# Patient Record
Sex: FEMALE | Race: WHITE | Employment: OTHER | URBAN - METROPOLITAN AREA
[De-identification: names, ages, dates, MRNs, and addresses within clinical notes are randomized per-mention and may not be internally consistent; named-entity substitution may affect disease eponyms.]

---

## 2020-10-01 ENCOUNTER — HOSPITAL ENCOUNTER (OUTPATIENT)
Age: 75
Discharge: HOME | End: 2020-10-03
Payer: COMMERCIAL

## 2020-10-01 PROCEDURE — U0003 INFECTIOUS AGENT DETECTION BY NUCLEIC ACID (DNA OR RNA); SEVERE ACUTE RESPIRATORY SYNDROME CORONAVIRUS 2 (SARS-COV-2) (CORONAVIRUS DISEASE [COVID-19]), AMPLIFIED PROBE TECHNIQUE, MAKING USE OF HIGH THROUGHPUT TECHNOLOGIES AS DESCRIBED BY CMS-2020-01-R: HCPCS

## 2020-10-03 LAB
SARS-COV-2: NOT DETECTED
SOURCE: NORMAL

## 2020-10-06 ENCOUNTER — HOSPITAL ENCOUNTER (OUTPATIENT)
Age: 75
Discharge: HOME | End: 2020-10-08
Payer: COMMERCIAL

## 2020-10-06 PROCEDURE — U0003 INFECTIOUS AGENT DETECTION BY NUCLEIC ACID (DNA OR RNA); SEVERE ACUTE RESPIRATORY SYNDROME CORONAVIRUS 2 (SARS-COV-2) (CORONAVIRUS DISEASE [COVID-19]), AMPLIFIED PROBE TECHNIQUE, MAKING USE OF HIGH THROUGHPUT TECHNOLOGIES AS DESCRIBED BY CMS-2020-01-R: HCPCS

## 2020-10-08 LAB
SARS-COV-2: NOT DETECTED
SOURCE: NORMAL

## 2020-10-09 ENCOUNTER — HOSPITAL ENCOUNTER (OUTPATIENT)
Age: 75
Discharge: HOME | End: 2020-10-11
Payer: COMMERCIAL

## 2020-10-09 PROCEDURE — U0003 INFECTIOUS AGENT DETECTION BY NUCLEIC ACID (DNA OR RNA); SEVERE ACUTE RESPIRATORY SYNDROME CORONAVIRUS 2 (SARS-COV-2) (CORONAVIRUS DISEASE [COVID-19]), AMPLIFIED PROBE TECHNIQUE, MAKING USE OF HIGH THROUGHPUT TECHNOLOGIES AS DESCRIBED BY CMS-2020-01-R: HCPCS

## 2020-10-11 LAB
SARS-COV-2: NOT DETECTED
SOURCE: NORMAL

## 2020-11-06 LAB
SARS-COV-2: NOT DETECTED
SOURCE: NORMAL

## 2020-11-15 LAB
SARS-COV-2: NOT DETECTED
SOURCE: NORMAL

## 2020-11-20 LAB
SARS-COV-2: NOT DETECTED
SOURCE: NORMAL

## 2020-11-24 LAB
SARS-COV-2: NOT DETECTED
SOURCE: NORMAL

## 2020-11-28 LAB
SARS-COV-2: NOT DETECTED
SOURCE: NORMAL

## 2020-12-12 LAB
SARS-COV-2: NOT DETECTED
SOURCE: NORMAL

## 2020-12-24 LAB
SARS-COV-2: NOT DETECTED
SOURCE: NORMAL

## 2020-12-31 LAB
SARS-COV-2: NOT DETECTED
SOURCE: NORMAL

## 2021-01-13 LAB
SARS-COV-2: NOT DETECTED
SOURCE: NORMAL

## 2021-04-04 LAB
SARS-COV-2: NOT DETECTED
SOURCE: NORMAL

## 2022-02-25 ENCOUNTER — TELEPHONE (OUTPATIENT)
Dept: SURGERY | Age: 77
End: 2022-02-25

## 2022-02-25 NOTE — PROGRESS NOTES
Orders for nutrition will be coming from Dr. Jim Hunter and I left a message to social service about patient have a peg tube replacement on 3/3  Electronically signed by Rg Calhoun on 2/25/2022 at 3:17 PM

## 2022-02-25 NOTE — TELEPHONE ENCOUNTER
Prior Authorization Form:      DEMOGRAPHICS:                     Patient Name:  Oracio Fleming  Patient :  1945            Insurance:  Payor: Derik Torres / Plan: Milan Kehr / Product Type: *No Product type* /   Insurance ID Number:    Payor/Plan Subscr  Sex Relation Sub.  Ins. ID Effective Group Num   1. MIA Mckinney 1945 Female Self [id number] 10/1/20 [id number]                                   [address]         DIAGNOSIS & PROCEDURE:                       Procedure/Operation: EGD with PEG tube placement           CPT Code: 87869    Diagnosis:  Inadequate oral intake    ICD10 Code: R63.8    Location:  [address] [place] [address]    Surgeon:  Ange Magana INFORMATION:                          Date: 3.3.2022    Time: TBD              Anesthesia:  MAC/TIVA                                                       Status:  Outpatient        Special Comments:         Electronically signed by Zelalem Mijares on 2022 at 1:05 PM

## 2022-02-25 NOTE — TELEPHONE ENCOUNTER
Per the order of Dr. Virginia Camara, patient has been scheduled for PEG tube placement on 3.3.2022. Patient currently in [facility]. All information provided to Salma at facility. Patient instructed to please contact our office with any questions. Dr. Kiah Peters will be providing patient with all nutrition orders. Procedure scheduled through [employer]. Dr. Virginia Camara to enter orders.     Electronically signed by Jordan Lyles on 2/25/22 at 1:05 PM EST

## 2022-03-02 RX ORDER — BUPROPION HYDROCHLORIDE 100 MG/1
100 TABLET, EXTENDED RELEASE ORAL 2 TIMES DAILY
Status: ON HOLD | COMMUNITY
End: 2022-03-22 | Stop reason: HOSPADM

## 2022-03-02 RX ORDER — CYANOCOBALAMIN 1000 UG/ML
1000 INJECTION INTRAMUSCULAR; SUBCUTANEOUS ONCE
COMMUNITY

## 2022-03-02 RX ORDER — SERTRALINE HYDROCHLORIDE 100 MG/1
100 TABLET, FILM COATED ORAL DAILY
COMMUNITY

## 2022-03-02 RX ORDER — FLUCONAZOLE 200 MG/1
200 TABLET ORAL DAILY
Status: ON HOLD | COMMUNITY
End: 2022-03-22 | Stop reason: HOSPADM

## 2022-03-02 RX ORDER — MECOBALAMIN 5000 MCG
5 TABLET,DISINTEGRATING ORAL NIGHTLY
Status: ON HOLD | COMMUNITY
End: 2022-03-22 | Stop reason: HOSPADM

## 2022-03-02 RX ORDER — PALIPERIDONE 1.5 MG/1
1.5 TABLET, EXTENDED RELEASE ORAL EVERY MORNING
Status: ON HOLD | COMMUNITY
End: 2022-03-22 | Stop reason: HOSPADM

## 2022-03-02 RX ORDER — FAMOTIDINE 40 MG/1
40 TABLET, FILM COATED ORAL DAILY
Status: ON HOLD | COMMUNITY
End: 2022-03-22 | Stop reason: HOSPADM

## 2022-03-02 NOTE — PROGRESS NOTES
preop instructions faxed to [address]   call placed to sister David Appiah della she states pt does not sign papers  David Appiah usually signs for her but son is poa lives out of state    Call placed to son Pearl Payor  [phone] poomero permission obtained per phone with 2 person witness

## 2022-03-02 NOTE — PROGRESS NOTES
[facility]                                                                                                                    PRE OP INSTRUCTIONS FOR  Karyn Rodriguez        Date: 3/2/2022    Date of surgery: 3/3/2022     730am   Arrival Time:  65-   545 am to ambulatory surgery  By stretcher    1. Do not eat or drink anything after  Midnight  prior to surgery. This includes no water, chewing gum, mints or ice chips. 2. Take the following medications with a small sip of water on the morning of Surgery: may have meds morning of surgery only wellbutrin zoloft and levothyroxin with tiny sip water    3. Diabetics may take evening dose of insulin but none after midnight. If you feel symptomatic or low blood sugar morning of surgery drink 1-2 ounces of apple juice only. 4. Aspirin, Ibuprofen, Advil, Naproxen, Vitamin E and other Anti-inflammatory products should be stopped  before surgery  as directed by your physician. Take Tylenol only unless instructed otherwise by your surgeon. 5. Check with your Doctor regarding stopping Plavix, Coumadin, Lovenox, Eliquis, Effient, or other blood thinners. 6. Do not smoke,use illicit drugs and do not drink any alcoholic beverages 24 hours prior to surgery. 7. You may brush your teeth the morning of surgery. DO NOT SWALLOW WATER    8. You MUST make arrangements for a responsible adult to take you home after your surgery. You will not be allowed to leave alone or drive yourself home. It is strongly suggested someone stay with you the first 24 hrs. Your surgery will be cancelled if you do not have a ride home. 9. PEDIATRIC PATIENTS ONLY:  A parent/legal guardian must accompany a child scheduled for surgery and plan to stay at the hospital until the child is discharged. Please do not bring other children with you.     10. Please wear simple, loose fitting clothing to the hospital.  Do not bring valuables (money, credit cards, checkbooks, etc.) Do not wear any makeup (including no eye makeup) or nail polish on your fingers or toes. 11. DO NOT wear any jewelry or piercings on day of surgery. All body piercing jewelry must be removed. 12. Shower the night before surgery with __x_Antibacterial soap /DALE WIPES________    13. TOTAL JOINT REPLACEMENT/HYSTERECTOMY PATIENTS ONLY---Remember to bring Blood Bank bracelet to the hospital on the day of surgery. 14. If you have a Living Will and Durable Power of  for Healthcare, please bring in a copy. 15. If appropriate bring crutches, inspirex, WALKER, CANE etc... 12. Notify your Surgeon if you develop any illness between now and surgery time, cough, cold, fever, sore throat, nausea, vomiting, etc.  Please notify your surgeon if you experience dizziness, shortness of breath or blurred vision between now & the time of your surgery. 17. If you have ___dentures, they will be removed before going to the OR; we will provide you a container. If you wear ___contact lenses or ___glasses, they will be removed; please bring a case for them. 18. To provide excellent care visitors will be limited to 2 in the room at any given time. 19. Please bring picture ID and insurance card. 20. Sleep apnea patients need to bring CPAP AND SETTINGS to hospital on day of surgery. 21. During flu season no children under the age of 15 are permitted in the hospital for the safety of all patients. 22. Other                  Please call AMBULATORY CARE if you have any further questions.    [facility]     [address]

## 2022-03-03 ENCOUNTER — ANESTHESIA (OUTPATIENT)
Dept: OPERATING ROOM | Age: 77
End: 2022-03-03
Payer: COMMERCIAL

## 2022-03-03 ENCOUNTER — ANESTHESIA EVENT (OUTPATIENT)
Dept: OPERATING ROOM | Age: 77
End: 2022-03-03
Payer: COMMERCIAL

## 2022-03-03 ENCOUNTER — HOSPITAL ENCOUNTER (OUTPATIENT)
Age: 77
Setting detail: OUTPATIENT SURGERY
Discharge: NURSING, REHAB OR LONG-TERM CARE | End: 2022-03-03
Attending: SURGERY | Admitting: SURGERY
Payer: COMMERCIAL

## 2022-03-03 VITALS
WEIGHT: 104 LBS | BODY MASS INDEX: 20.96 KG/M2 | HEIGHT: 59 IN | OXYGEN SATURATION: 96 % | HEART RATE: 89 BPM | RESPIRATION RATE: 18 BRPM | DIASTOLIC BLOOD PRESSURE: 72 MMHG | TEMPERATURE: 97.1 F | SYSTOLIC BLOOD PRESSURE: 132 MMHG

## 2022-03-03 VITALS
SYSTOLIC BLOOD PRESSURE: 104 MMHG | DIASTOLIC BLOOD PRESSURE: 56 MMHG | OXYGEN SATURATION: 98 % | RESPIRATION RATE: 17 BRPM

## 2022-03-03 PROCEDURE — 3700000001 HC ADD 15 MINUTES (ANESTHESIA): Performed by: SURGERY

## 2022-03-03 PROCEDURE — 3700000000 HC ANESTHESIA ATTENDED CARE: Performed by: SURGERY

## 2022-03-03 PROCEDURE — 2580000003 HC RX 258: Performed by: ANESTHESIOLOGY

## 2022-03-03 PROCEDURE — 7100000010 HC PHASE II RECOVERY - FIRST 15 MIN: Performed by: SURGERY

## 2022-03-03 PROCEDURE — 3600000002 HC SURGERY LEVEL 2 BASE: Performed by: SURGERY

## 2022-03-03 PROCEDURE — 2500000003 HC RX 250 WO HCPCS: Performed by: SURGERY

## 2022-03-03 PROCEDURE — 2580000003 HC RX 258: Performed by: NURSE ANESTHETIST, CERTIFIED REGISTERED

## 2022-03-03 PROCEDURE — 2709999900 HC NON-CHARGEABLE SUPPLY: Performed by: SURGERY

## 2022-03-03 PROCEDURE — 6360000002 HC RX W HCPCS: Performed by: SURGERY

## 2022-03-03 PROCEDURE — 7100000011 HC PHASE II RECOVERY - ADDTL 15 MIN: Performed by: SURGERY

## 2022-03-03 PROCEDURE — 6360000002 HC RX W HCPCS: Performed by: NURSE ANESTHETIST, CERTIFIED REGISTERED

## 2022-03-03 PROCEDURE — 2580000003 HC RX 258: Performed by: SURGERY

## 2022-03-03 PROCEDURE — 43246 EGD PLACE GASTROSTOMY TUBE: CPT | Performed by: SURGERY

## 2022-03-03 PROCEDURE — 3600000012 HC SURGERY LEVEL 2 ADDTL 15MIN: Performed by: SURGERY

## 2022-03-03 PROCEDURE — 6370000000 HC RX 637 (ALT 250 FOR IP): Performed by: ANESTHESIOLOGY

## 2022-03-03 RX ORDER — SODIUM CHLORIDE 9 MG/ML
25 INJECTION, SOLUTION INTRAVENOUS PRN
Status: DISCONTINUED | OUTPATIENT
Start: 2022-03-03 | End: 2022-03-03 | Stop reason: HOSPADM

## 2022-03-03 RX ORDER — SODIUM CHLORIDE, SODIUM LACTATE, POTASSIUM CHLORIDE, CALCIUM CHLORIDE 600; 310; 30; 20 MG/100ML; MG/100ML; MG/100ML; MG/100ML
INJECTION, SOLUTION INTRAVENOUS CONTINUOUS PRN
Status: DISCONTINUED | OUTPATIENT
Start: 2022-03-03 | End: 2022-03-03 | Stop reason: SDUPTHER

## 2022-03-03 RX ORDER — SODIUM CHLORIDE, SODIUM LACTATE, POTASSIUM CHLORIDE, CALCIUM CHLORIDE 600; 310; 30; 20 MG/100ML; MG/100ML; MG/100ML; MG/100ML
INJECTION, SOLUTION INTRAVENOUS CONTINUOUS
Status: DISCONTINUED | OUTPATIENT
Start: 2022-03-03 | End: 2022-03-03 | Stop reason: HOSPADM

## 2022-03-03 RX ORDER — LIDOCAINE HYDROCHLORIDE 10 MG/ML
INJECTION, SOLUTION EPIDURAL; INFILTRATION; INTRACAUDAL; PERINEURAL PRN
Status: DISCONTINUED | OUTPATIENT
Start: 2022-03-03 | End: 2022-03-03 | Stop reason: ALTCHOICE

## 2022-03-03 RX ORDER — SODIUM CHLORIDE 0.9 % (FLUSH) 0.9 %
5-40 SYRINGE (ML) INJECTION PRN
Status: DISCONTINUED | OUTPATIENT
Start: 2022-03-03 | End: 2022-03-03 | Stop reason: HOSPADM

## 2022-03-03 RX ORDER — SODIUM CHLORIDE 0.9 % (FLUSH) 0.9 %
5-40 SYRINGE (ML) INJECTION EVERY 12 HOURS SCHEDULED
Status: DISCONTINUED | OUTPATIENT
Start: 2022-03-03 | End: 2022-03-03 | Stop reason: HOSPADM

## 2022-03-03 RX ORDER — PROPOFOL 10 MG/ML
INJECTION, EMULSION INTRAVENOUS CONTINUOUS PRN
Status: DISCONTINUED | OUTPATIENT
Start: 2022-03-03 | End: 2022-03-03 | Stop reason: SDUPTHER

## 2022-03-03 RX ORDER — PROPOFOL 10 MG/ML
INJECTION, EMULSION INTRAVENOUS PRN
Status: DISCONTINUED | OUTPATIENT
Start: 2022-03-03 | End: 2022-03-03 | Stop reason: SDUPTHER

## 2022-03-03 RX ORDER — MEPERIDINE HYDROCHLORIDE 25 MG/ML
12.5 INJECTION INTRAMUSCULAR; INTRAVENOUS; SUBCUTANEOUS EVERY 5 MIN PRN
Status: DISCONTINUED | OUTPATIENT
Start: 2022-03-03 | End: 2022-03-03 | Stop reason: HOSPADM

## 2022-03-03 RX ORDER — ACETAMINOPHEN 325 MG/1
650 TABLET ORAL ONCE
Status: COMPLETED | OUTPATIENT
Start: 2022-03-03 | End: 2022-03-03

## 2022-03-03 RX ORDER — LIDOCAINE HYDROCHLORIDE 20 MG/ML
INJECTION, SOLUTION INTRAVENOUS PRN
Status: DISCONTINUED | OUTPATIENT
Start: 2022-03-03 | End: 2022-03-03 | Stop reason: SDUPTHER

## 2022-03-03 RX ADMIN — PROPOFOL INJECTABLE EMULSION 20 MG: 10 INJECTION, EMULSION INTRAVENOUS at 11:55

## 2022-03-03 RX ADMIN — LIDOCAINE HYDROCHLORIDE 60 MG: 20 INJECTION, SOLUTION INTRAVENOUS at 11:49

## 2022-03-03 RX ADMIN — PROPOFOL INJECTABLE EMULSION 30 MG: 10 INJECTION, EMULSION INTRAVENOUS at 11:49

## 2022-03-03 RX ADMIN — SODIUM CHLORIDE, POTASSIUM CHLORIDE, SODIUM LACTATE AND CALCIUM CHLORIDE: 600; 310; 30; 20 INJECTION, SOLUTION INTRAVENOUS at 10:00

## 2022-03-03 RX ADMIN — CEFAZOLIN 1000 MG: 1 INJECTION, POWDER, FOR SOLUTION INTRAMUSCULAR; INTRAVENOUS at 13:18

## 2022-03-03 RX ADMIN — PROPOFOL 120 MCG/KG/MIN: 10 INJECTION, EMULSION INTRAVENOUS at 11:49

## 2022-03-03 RX ADMIN — SODIUM CHLORIDE, POTASSIUM CHLORIDE, SODIUM LACTATE AND CALCIUM CHLORIDE: 600; 310; 30; 20 INJECTION, SOLUTION INTRAVENOUS at 11:34

## 2022-03-03 RX ADMIN — PROPOFOL INJECTABLE EMULSION 20 MG: 10 INJECTION, EMULSION INTRAVENOUS at 11:51

## 2022-03-03 RX ADMIN — PROPOFOL INJECTABLE EMULSION 20 MG: 10 INJECTION, EMULSION INTRAVENOUS at 11:53

## 2022-03-03 RX ADMIN — ACETAMINOPHEN 650 MG: 325 TABLET ORAL at 13:04

## 2022-03-03 ASSESSMENT — PULMONARY FUNCTION TESTS
PIF_VALUE: 1
PIF_VALUE: 0
PIF_VALUE: 0
PIF_VALUE: 1
PIF_VALUE: 0
PIF_VALUE: 1
PIF_VALUE: 0
PIF_VALUE: 1
PIF_VALUE: 1
PIF_VALUE: 0
PIF_VALUE: 1
PIF_VALUE: 2

## 2022-03-03 ASSESSMENT — PAIN SCALES - GENERAL
PAINLEVEL_OUTOF10: 0
PAINLEVEL_OUTOF10: 2
PAINLEVEL_OUTOF10: 8

## 2022-03-03 ASSESSMENT — PAIN DESCRIPTION - LOCATION: LOCATION: ABDOMEN

## 2022-03-03 ASSESSMENT — PAIN - FUNCTIONAL ASSESSMENT: PAIN_FUNCTIONAL_ASSESSMENT: 0-10

## 2022-03-03 ASSESSMENT — PAIN DESCRIPTION - PAIN TYPE: TYPE: ACUTE PAIN;SURGICAL PAIN

## 2022-03-03 NOTE — H&P
General Surgery History and Physical  [facility]    Patient's Name/Date of Birth: Medhat Tinoco [birth year]    Date: March 3, 2022     Surgeon: Lucy Quintana MD    PCP: Luis Corcoran MD     Chief Complaint: Failure to thrive, poor p.o. intake    HPI:   Medhat Tinoco is a 67 y.o. female who presents for evaluation of failure to thrive and poor p.o. intake with malnutrition. The patient is minimally conversant and history is obtained from her sister. Her sister reports that she was doing well however recently just completely stopped eating. She has lost a significant amount of weight from poor p.o. intake. She was referred for feeding tube placement. Patient Active Problem List   Diagnosis    Odontoid fracture (HCC)    Hypothyroid    Multiple rib fractures    TMJ (temporomandibular joint syndrome)    Bipolar disorder (HCC)    Sciatica    Hiatal hernia    Abnormality of gait and mobility    Trigeminal neuralgia    Headache    Cervical neck pain with evidence of disc disease    Vomiting    Hypokalemia       Past Medical History:   Diagnosis Date    Allergic     Anxiety disorder     Arthritis     Difficulty walking     Dysphagia     Hyperlipidemia     Hypertension     Interstitial cystitis     Lack of coordination     Mitral valve prolapse     pt. has a mitral valve prolapse    Neuromuscular disorder (HCC)     Other disorders of kidney and ureter in diseases classified elsewhere     Pelvis fracture (HCC)     multiple fxs pelvis    Psychiatric problem     Thyroid disease     Trigeminal neuralgia     Unspecified dementia without behavioral disturbance ([facility] [address]        Past Surgical History:   Procedure Laterality Date    FRACTURE SURGERY      TEMPOROMANDIBULAR JOINT SURGERY Left        Allergies   Allergen Reactions    Abilify [Aripiprazole]     Depakote [Divalproex Sodium]     Dye [Iodides] Hives     Pt. Is allergic to CT dye. Breaks out in hive.s  Pt.  Is allergic to CT dye. Breaks out in hives. Pt. Gets pre-medicated with benadryl prior to CT.  Geodon [Ziprasidone Hcl]     Levsin [Hyoscyamine]     Lithium     Neurontin [Gabapentin]     Risperidone And Related     Tegretol [Carbamazepine]     Tramadol     Trileptal [Oxcarbazepine]     Zyprexa [Olanzapine]        The patient has a family history that is negative for severe cardiovascular or respiratory issues, negative for reaction to anesthesia. Time spent reviewing past medical, surgical, social and family history, vitals, nursing assessment and images. No changes from above documented history. Social History     Socioeconomic History    Marital status:      Spouse name: Not on file    Number of children: 2    Years of education: 15    Highest education level: Not on file   Occupational History    Not on file   Tobacco Use    Smoking status: Never Smoker    Smokeless tobacco: Not on file   Substance and Sexual Activity    Alcohol use: No    Drug use: No    Sexual activity: Never   Other Topics Concern    Not on file   Social History Narrative    Not on file     Social Determinants of Health     Financial Resource Strain:     Difficulty of Paying Living Expenses: Not on file   Food Insecurity:     Worried About Running Out of Food in the Last Year: Not on file    Kassy of Food in the Last Year: Not on file   Transportation Needs:     Lack of Transportation (Medical): Not on file    Lack of Transportation (Non-Medical):  Not on file   Physical Activity:     Days of Exercise per Week: Not on file    Minutes of Exercise per Session: Not on file   Stress:     Feeling of Stress : Not on file   Social Connections:     Frequency of Communication with Friends and Family: Not on file    Frequency of Social Gatherings with Friends and Family: Not on file    Attends Restorationism Services: Not on file    Active Member of Clubs or Organizations: Not on file    Attends Club or Organization Meetings: Not on file    Marital Status: Not on file   Intimate Partner Violence:     Fear of Current or Ex-Partner: Not on file    Emotionally Abused: Not on file    Physically Abused: Not on file    Sexually Abused: Not on file   Housing Stability:     Unable to Pay for Housing in the Last Year: Not on file    Number of Dru in the Last Year: Not on file    Unstable Housing in the Last Year: Not on file       I have reviewed relevant labs from this admission and interpretation is included in my assessment and plan    Review of Systems    A complete 10 system review was performed and are otherwise negative unless mentioned in the above HPI. Specific negatives are listed below but may not include all those reviewed.     General ROS: negative obtundation, AMS  ENT ROS: negative rhinorrhea, epistaxis  Allergy and Immunology ROS: negative itchy/watery eyes or nasal congestion  Hematological and Lymphatic ROS: negative spontaneous bleeding or bruising  Endocrine ROS: negative  lethargy, mood swings, palpitations or polydipsia/polyuria  Respiratory ROS: negative sputum changes, stridor, tachypnea or wheezing  Cardiovascular ROS: negative for - loss of consciousness, murmur or orthopnea  Gastrointestinal ROS: negative for - hematochezia or hematemesis  Genito-Urinary ROS: negative for -  genital discharge or hematuria  Musculoskeletal ROS: negative for - focal weakness, gangrene  Psych/Neuro ROS: negative for - visual or auditory hallucinations, suicidal ideation    Physical exam:   /69   Pulse 90   Temp 97.8 °F (36.6 °C) (Temporal)   Resp 16   Ht 4' 11\" (1.499 m)   Wt 104 lb (47.2 kg)   SpO2 94%   BMI 21.01 kg/m²   General appearance:  NAD, appears stated age  Head: NCAT, PERRLA, EOMI, red conjunctiva  Neck: supple, no masses, trachea midline  Lungs: Equal chest rise bilateral, no retractions, no wheezing  Heart: Reg rate  Abdomen: soft, nondistended  Skin; warm and dry, no cyanosis  Gu: no cva tenderness  Extremities: atraumatic, no focal motor deficits, no open wounds  Psych: No tremor, visual hallucinations      Radiology: n/a    Assessment:  Jennie Meraz is a 67 y.o. female with failure to thrive, poor p.o. intake  Patient Active Problem List   Diagnosis    Odontoid fracture ([facility] [address]    Hypothyroid    Multiple rib fractures    TMJ (temporomandibular joint syndrome)    Bipolar disorder (HCC)    Sciatica    Hiatal hernia    Abnormality of gait and mobility    Trigeminal neuralgia    Headache    Cervical neck pain with evidence of disc disease    Vomiting    Hypokalemia         Plan:  EGD with PEG  -The procedure, risks, benefits and alternatives were discussed with patient. she   agrees to proceed.         Matthew Sanders MD  10:30 AM

## 2022-03-03 NOTE — ANESTHESIA POSTPROCEDURE EVALUATION
Department of Anesthesiology  Postprocedure Note    Patient: Jeanette Pace  MRN: [id number]  YOB: 1945  Date of evaluation: 3/3/2022  Time:  12:49 PM     Procedure Summary     Date: 03/03/22 Room / Location: [address] [place] [address] / [address] [place] [address]    Anesthesia Start: 1140 Anesthesia Stop: 1219    Procedure: EGD PEG TUBE PLACEMENT (N/A Abdomen) Diagnosis: (INADEQUATE ORAL INTAKE)    Surgeons: yAesha De Dios MD Responsible Provider: Tamika Hilliard MD    Anesthesia Type: MAC ASA Status: 4          Anesthesia Type: MAC    River Phase I: River Score: 10    River Phase II: River Score: 10    Last vitals: Reviewed and per EMR flowsheets.        Anesthesia Post Evaluation    Patient location during evaluation: PACU  Patient participation: complete - patient participated  Level of consciousness: awake  Pain score: 0  Airway patency: patent  Nausea & Vomiting: no nausea  Complications: no  Cardiovascular status: blood pressure returned to baseline  Respiratory status: acceptable  Hydration status: euvolemic

## 2022-03-03 NOTE — OP NOTE
[facility] [facility]  OPERATIVE NOTE    3/3/2022  Medhat Tinoco  11:38 AM    PREOPERATIVE DIAGNOSES: Malnutrition, poor p.o. intake. POSTOPERATIVE DIAGNOSES: Malnutrition, poor p.o. intake. OPERATION: Percutaneous endoscopic gastrostomy tube placement with upper endoscopy. SURGEON: Lucy Quintana MD    ASSISTANT: none. ANESTHESIA: LMAC     ESTIMATED BLOOD LOSS: Minimal.     COMPLICATIONS: None. SPECIMEN: None. DESCRIPTION OF PROCEDURE: PROCEDURE: The patient was taken to the endoscopy suite and placed on the endoscopy table in a supine position. LMAC anesthesia was administered. A bite block was inserted. A lubricated gastroscope was inserted into the oropharynx and advanced under direct vision to the esophagus and then the stomach. The stomach was insufflated. The anterior abdominal wall was transilluminated. The light source was easily visualized. Indentation was observed with palpation of the abdominal wall. This area was prepped and draped. Local anesthetic was injected. A #11 blade was used to make a transverse incision. A snare forceps was inserted through the gastroscope and deployed into the gastric lumen. An angiocatheter was inserted through the incision into the gastric lumen under direct vision. A wire was inserted through the anterior catheter and grasped   with the snare forceps. The gastroscope, snare, and wire were then pulled out through the patient's mouth. The gastrostomy tube was connected to the wire, and the wire was pulled through the stomach and out through the anterior abdominal wall. The gastroscope was reintroduced into the stomach. The PEG tube was seen in good position without evidence of bleeding. The gastroscope was removed. The PEG tube was cut to size and fit with a bumper and a feeding apparatus at 2.5 cm at the abdominal wall.  The patient tolerated the procedure well and went to the recovery room in a good condition. I was present for the entire procedure. All instrument counts, lap counts, and needle counts were correct at the end of the procedure.     Fran Serrano MD  3/3/2022  11:38 AM

## 2022-03-03 NOTE — PROGRESS NOTES
Spoke with [facility] ambulance service, they will be here to pickup patient at 3 pm and will mercedez if they can be here any sooner to  the patient.

## 2022-03-03 NOTE — PROGRESS NOTES
Nurse to nurse called to Lisbeth Avila at [facility]. Went over discharge instructions with her via telephone and will send paper copy with patient to nursing home.

## 2022-03-03 NOTE — ANESTHESIA PRE PROCEDURE
Department of Anesthesiology  Preprocedure Note       Name:  Jazmín Meng   Age:  67 y.o.  :  1945                                          MRN:  [id number]         Date:  3/3/2022      Surgeon: Corinthia Goodpasture):  Kyle Ackerman MD    Procedure: Procedure(s):  EGD PEG TUBE PLACEMENT    Medications prior to admission:   Prior to Admission medications    Medication Sig Start Date End Date Taking? Authorizing Provider   vitamin D (CHOLECALCIFEROL) 25 MCG (1000 UT) TABS tablet Take 1,000 Units by mouth daily   Yes Historical Provider, MD   paliperidone (INVEGA) 1.5 MG extended release tablet Take 1.5 mg by mouth every morning   Yes Historical Provider, MD   famotidine (PEPCID) 40 MG tablet Take 40 mg by mouth daily   Yes Historical Provider, MD   sertraline (ZOLOFT) 100 MG tablet Take 100 mg by mouth daily   Yes Historical Provider, MD   buPROPion (WELLBUTRIN SR) 100 MG extended release tablet Take 100 mg by mouth 2 times daily   Yes Historical Provider, MD   cyanocobalamin 1000 MCG/ML injection Inject 1,000 mcg into the muscle once   Yes Historical Provider, MD   melatonin 5 MG TBDP disintegrating tablet Take 5 mg by mouth nightly   Yes Historical Provider, MD   fluconazole (DIFLUCAN) 200 MG tablet Take 200 mg by mouth daily   Yes Historical Provider, MD   levothyroxine (SYNTHROID) 50 MCG tablet Take 50 mcg by mouth Daily. Yes Historical Provider, MD   acetaminophen (TYLENOL) 325 MG tablet Take 650 mg by mouth every 4 hours as needed for Pain or Fever.    Yes Historical Provider, MD       Current medications:    Current Facility-Administered Medications   Medication Dose Route Frequency Provider Last Rate Last Admin    lactated ringers infusion   IntraVENous Continuous Laura Estrella  mL/hr at 22 1000 New Bag at 22 1000    sodium chloride flush 0.9 % injection 5-40 mL  5-40 mL IntraVENous PRN Laura Estrella MD        sodium chloride flush 0.9 % injection 5-40 mL  5-40 mL IntraVENous 2 times per day Kandi Hook MD        sodium chloride flush 0.9 % injection 5-40 mL  5-40 mL IntraVENous PRN Kandi Hook MD        0.9 % sodium chloride infusion  25 mL IntraVENous PRN Kandi Hook MD        ceFAZolin (ANCEF) 1,000 mg in sterile water 10 mL IV syringe  1,000 mg IntraVENous On Call to Sebastian Martinez MD         Facility-Administered Medications Ordered in Other Encounters   Medication Dose Route Frequency Provider Last Rate Last Admin    lactated ringers infusion   IntraVENous Continuous PRN JOHN Cuadra - CRNA   New Bag at 03/03/22 1134       Allergies: Allergies   Allergen Reactions    Abilify [Aripiprazole]     Depakote [Divalproex Sodium]     Dye [Iodides] Hives     Pt. Is allergic to CT dye. Breaks out in hive.s  Pt. Is allergic to CT dye. Breaks out in hives. Pt. Gets pre-medicated with benadryl prior to CT.         Geodon [Ziprasidone Hcl]     Levsin [Hyoscyamine]     Lithium     Neurontin [Gabapentin]     Risperidone And Related     Tegretol [Carbamazepine]     Tramadol     Trileptal [Oxcarbazepine]     Zyprexa [Olanzapine]        Problem List:    Patient Active Problem List   Diagnosis Code    Odontoid fracture ([facility] [address] S12.110A    Hypothyroid E03.9    Multiple rib fractures S22.49XA    TMJ (temporomandibular joint syndrome) M26.609    Bipolar disorder ([facility] [address] F31.9    Sciatica M54.30    Hiatal hernia K44.9    Abnormality of gait and mobility R26.9    Trigeminal neuralgia G50.0    Headache R51.9    Cervical neck pain with evidence of disc disease M50.90    Vomiting R11.10    Hypokalemia E87.6       Past Medical History:        Diagnosis Date    Allergic     Anxiety disorder     Arthritis     Difficulty walking     Dysphagia     Hyperlipidemia     Hypertension     Interstitial cystitis     Lack of coordination     Mitral valve prolapse     pt. has a mitral valve prolapse    Neuromuscular disorder (HCC)     Other disorders of kidney and ureter in diseases classified elsewhere     Pelvis fracture ([facility] [address]     multiple fxs pelvis    Psychiatric problem     Thyroid disease     Trigeminal neuralgia     Unspecified dementia without behavioral disturbance (HCC)        Past Surgical History:        Procedure Laterality Date    FRACTURE SURGERY      TEMPOROMANDIBULAR JOINT SURGERY Left        Social History:    Social History     Tobacco Use    Smoking status: Never Smoker    Smokeless tobacco: Not on file   Substance Use Topics    Alcohol use: No                                Counseling given: Not Answered      Vital Signs (Current):   Vitals:    03/02/22 1034 03/03/22 0920   BP:  130/69   Pulse:  90   Resp:  16   Temp:  97.8 °F (36.6 °C)   TempSrc:  Temporal   SpO2:  94%   Weight: 104 lb (47.2 kg) 104 lb (47.2 kg)   Height: 4' 11\" (1.499 m) 4' 11\" (1.499 m)                                              BP Readings from Last 3 Encounters:   03/03/22 130/69       NPO Status: Time of last liquid consumption: 0001                        Time of last solid consumption: 1700                        Date of last liquid consumption: 03/03/22                        Date of last solid food consumption: 03/02/22    BMI:   Wt Readings from Last 3 Encounters:   03/03/22 104 lb (47.2 kg)     Body mass index is 21.01 kg/m².     CBC:   Lab Results   Component Value Date    WBC 6.9 06/24/2014    RBC 3.86 06/24/2014    HGB 12.6 06/24/2014    HCT 37.7 06/24/2014    MCV 97.6 06/24/2014    RDW 13.7 06/24/2014     06/24/2014       CMP:   Lab Results   Component Value Date     07/01/2014    K 4.4 07/01/2014     07/01/2014    CO2 26 07/01/2014    BUN <5 07/01/2014    CREATININE 0.7 07/01/2014    GFRAA >60 07/01/2014    LABGLOM >60 07/01/2014    GLUCOSE 87 07/01/2014    PROT 5.8 07/01/2014    CALCIUM 9.7 07/01/2014    BILITOT 0.3 07/01/2014    ALKPHOS 53 07/01/2014    AST 14 07/01/2014    ALT 9 07/01/2014       POC Tests: No results for input(s): POCGLU, POCNA, POCK, POCCL, POCBUN, POCHEMO, POCHCT in the last 72 hours. Coags:   Lab Results   Component Value Date    PROTIME 11.6 05/08/2014    INR 1.1 05/08/2014       HCG (If Applicable): No results found for: PREGTESTUR, PREGSERUM, HCG, HCGQUANT     ABGs: No results found for: PHART, PO2ART, OQG3YTC, URH9MAB, BEART, G9DGAQOW     Type & Screen (If Applicable):  No results found for: LABABO, LABRH    Drug/Infectious Status (If Applicable):  No results found for: HIV, HEPCAB    COVID-19 Screening (If Applicable):   Lab Results   Component Value Date    COVID19 Not Detected 04/09/2021           Anesthesia Evaluation  Patient summary reviewed  Airway: Mallampati: IV  TM distance: <3 FB   Neck ROM: limited  Mouth opening: < 3 FB Dental:          Pulmonary: breath sounds clear to auscultation                            ROS comment: Allergy. Cardiovascular:    (+) hypertension:, valvular problems/murmurs: MVP, hyperlipidemia        Rhythm: regular  Rate: normal           Beta Blocker:  Not on Beta Blocker         Neuro/Psych:   (+) neuromuscular disease:, headaches:, psychiatric history:             ROS comment: Difficult Walking. Lack of Coordination. S/P Fracture Pelvis. GI/Hepatic/Renal:   (+) hiatal hernia,          ROS comment: Dysphagia. Interstitial Cystitis. .   Endo/Other:    (+) hypothyroidism::., .                 Abdominal:             Vascular: Other Findings:             Anesthesia Plan      MAC     ASA 4       Induction: intravenous. MIPS: Postoperative opioids intended. Anesthetic plan and risks discussed with patient. Plan discussed with CRNA.     Attending anesthesiologist reviewed and agrees with Darylene Ehrich, MD   3/3/2022

## 2022-03-08 ENCOUNTER — HOSPITAL ENCOUNTER (INPATIENT)
Age: 77
LOS: 14 days | Discharge: NURSING, REHAB OR LONG-TERM CARE | DRG: 862 | End: 2022-03-22
Attending: INTERNAL MEDICINE | Admitting: INTERNAL MEDICINE
Payer: COMMERCIAL

## 2022-03-08 PROBLEM — B99.9 INTRA-ABDOMINAL INFECTION: Status: ACTIVE | Noted: 2022-03-08

## 2022-03-08 LAB
CHLORIDE URINE RANDOM: 132 MMOL/L
POTASSIUM, UR: 34.3 MMOL/L
SODIUM URINE: 143 MMOL/L

## 2022-03-08 PROCEDURE — 87205 SMEAR GRAM STAIN: CPT

## 2022-03-08 PROCEDURE — 87186 SC STD MICRODIL/AGAR DIL: CPT

## 2022-03-08 PROCEDURE — 2060000000 HC ICU INTERMEDIATE R&B

## 2022-03-08 PROCEDURE — 6360000002 HC RX W HCPCS: Performed by: INTERNAL MEDICINE

## 2022-03-08 PROCEDURE — 87040 BLOOD CULTURE FOR BACTERIA: CPT

## 2022-03-08 PROCEDURE — 83935 ASSAY OF URINE OSMOLALITY: CPT

## 2022-03-08 PROCEDURE — 84300 ASSAY OF URINE SODIUM: CPT

## 2022-03-08 PROCEDURE — 84133 ASSAY OF URINE POTASSIUM: CPT

## 2022-03-08 PROCEDURE — 82436 ASSAY OF URINE CHLORIDE: CPT

## 2022-03-08 PROCEDURE — 87077 CULTURE AEROBIC IDENTIFY: CPT

## 2022-03-08 PROCEDURE — 36415 COLL VENOUS BLD VENIPUNCTURE: CPT

## 2022-03-08 PROCEDURE — 85651 RBC SED RATE NONAUTOMATED: CPT

## 2022-03-08 PROCEDURE — 86140 C-REACTIVE PROTEIN: CPT

## 2022-03-08 PROCEDURE — 83930 ASSAY OF BLOOD OSMOLALITY: CPT

## 2022-03-08 PROCEDURE — 82570 ASSAY OF URINE CREATININE: CPT

## 2022-03-08 PROCEDURE — 87070 CULTURE OTHR SPECIMN AEROBIC: CPT

## 2022-03-08 PROCEDURE — 87147 CULTURE TYPE IMMUNOLOGIC: CPT

## 2022-03-08 PROCEDURE — 2580000003 HC RX 258: Performed by: INTERNAL MEDICINE

## 2022-03-08 PROCEDURE — C9113 INJ PANTOPRAZOLE SODIUM, VIA: HCPCS | Performed by: INTERNAL MEDICINE

## 2022-03-08 RX ORDER — ALBUTEROL SULFATE 2.5 MG/3ML
2.5 SOLUTION RESPIRATORY (INHALATION) EVERY 6 HOURS PRN
Status: DISCONTINUED | OUTPATIENT
Start: 2022-03-08 | End: 2022-03-22 | Stop reason: HOSPADM

## 2022-03-08 RX ORDER — SODIUM CHLORIDE 9 MG/ML
INJECTION, SOLUTION INTRAVENOUS CONTINUOUS
Status: DISCONTINUED | OUTPATIENT
Start: 2022-03-08 | End: 2022-03-09

## 2022-03-08 RX ORDER — FLUCONAZOLE 2 MG/ML
200 INJECTION, SOLUTION INTRAVENOUS EVERY 24 HOURS
Status: DISCONTINUED | OUTPATIENT
Start: 2022-03-09 | End: 2022-03-22 | Stop reason: HOSPADM

## 2022-03-08 RX ORDER — MORPHINE SULFATE 2 MG/ML
2 INJECTION, SOLUTION INTRAMUSCULAR; INTRAVENOUS EVERY 4 HOURS PRN
Status: DISCONTINUED | OUTPATIENT
Start: 2022-03-08 | End: 2022-03-22 | Stop reason: HOSPADM

## 2022-03-08 RX ORDER — ONDANSETRON 2 MG/ML
4 INJECTION INTRAMUSCULAR; INTRAVENOUS EVERY 6 HOURS PRN
Status: DISCONTINUED | OUTPATIENT
Start: 2022-03-08 | End: 2022-03-22 | Stop reason: HOSPADM

## 2022-03-08 RX ORDER — PANTOPRAZOLE SODIUM 40 MG/10ML
40 INJECTION, POWDER, LYOPHILIZED, FOR SOLUTION INTRAVENOUS DAILY
Status: DISCONTINUED | OUTPATIENT
Start: 2022-03-08 | End: 2022-03-09

## 2022-03-08 RX ORDER — FLUCONAZOLE 2 MG/ML
400 INJECTION, SOLUTION INTRAVENOUS ONCE
Status: COMPLETED | OUTPATIENT
Start: 2022-03-08 | End: 2022-03-08

## 2022-03-08 RX ORDER — HEPARIN SODIUM 5000 [USP'U]/ML
5000 INJECTION, SOLUTION INTRAVENOUS; SUBCUTANEOUS EVERY 8 HOURS SCHEDULED
Status: DISCONTINUED | OUTPATIENT
Start: 2022-03-08 | End: 2022-03-22 | Stop reason: HOSPADM

## 2022-03-08 RX ADMIN — HEPARIN SODIUM 5000 UNITS: 5000 INJECTION INTRAVENOUS; SUBCUTANEOUS at 22:33

## 2022-03-08 RX ADMIN — PANTOPRAZOLE SODIUM 40 MG: 40 INJECTION, POWDER, FOR SOLUTION INTRAVENOUS at 22:31

## 2022-03-08 RX ADMIN — PIPERACILLIN SODIUM AND TAZOBACTAM SODIUM 3375 MG: 3; 375 INJECTION, POWDER, FOR SOLUTION INTRAVENOUS at 23:01

## 2022-03-08 RX ADMIN — FLUCONAZOLE 400 MG: 400 INJECTION, SOLUTION INTRAVENOUS at 22:53

## 2022-03-08 RX ADMIN — SODIUM CHLORIDE: 9 INJECTION, SOLUTION INTRAVENOUS at 22:36

## 2022-03-08 SDOH — HEALTH STABILITY: PHYSICAL HEALTH: ON AVERAGE, HOW MANY MINUTES DO YOU ENGAGE IN EXERCISE AT THIS LEVEL?: 0 MIN

## 2022-03-08 SDOH — ECONOMIC STABILITY: HOUSING INSECURITY
IN THE LAST 12 MONTHS, WAS THERE A TIME WHEN YOU DID NOT HAVE A STEADY PLACE TO SLEEP OR SLEPT IN A SHELTER (INCLUDING NOW)?: NO

## 2022-03-08 SDOH — ECONOMIC STABILITY: HOUSING INSECURITY: IN THE LAST 12 MONTHS, HOW MANY PLACES HAVE YOU LIVED?: 1

## 2022-03-08 SDOH — HEALTH STABILITY: PHYSICAL HEALTH: ON AVERAGE, HOW MANY DAYS PER WEEK DO YOU ENGAGE IN MODERATE TO STRENUOUS EXERCISE (LIKE A BRISK WALK)?: 0 DAYS

## 2022-03-08 SDOH — ECONOMIC STABILITY: TRANSPORTATION INSECURITY
IN THE PAST 12 MONTHS, HAS LACK OF TRANSPORTATION KEPT YOU FROM MEETINGS, WORK, OR FROM GETTING THINGS NEEDED FOR DAILY LIVING?: NO

## 2022-03-08 SDOH — ECONOMIC STABILITY: TRANSPORTATION INSECURITY
IN THE PAST 12 MONTHS, HAS THE LACK OF TRANSPORTATION KEPT YOU FROM MEDICAL APPOINTMENTS OR FROM GETTING MEDICATIONS?: NO

## 2022-03-08 SDOH — ECONOMIC STABILITY: INCOME INSECURITY: IN THE LAST 12 MONTHS, WAS THERE A TIME WHEN YOU WERE NOT ABLE TO PAY THE MORTGAGE OR RENT ON TIME?: NO

## 2022-03-08 SDOH — ECONOMIC STABILITY: FOOD INSECURITY: WITHIN THE PAST 12 MONTHS, YOU WORRIED THAT YOUR FOOD WOULD RUN OUT BEFORE YOU GOT MONEY TO BUY MORE.: NEVER TRUE

## 2022-03-08 SDOH — ECONOMIC STABILITY: FOOD INSECURITY: WITHIN THE PAST 12 MONTHS, THE FOOD YOU BOUGHT JUST DIDN'T LAST AND YOU DIDN'T HAVE MONEY TO GET MORE.: NEVER TRUE

## 2022-03-08 ASSESSMENT — SOCIAL DETERMINANTS OF HEALTH (SDOH)
HOW OFTEN DO YOU ATTEND CHURCH OR RELIGIOUS SERVICES?: NEVER
IN A TYPICAL WEEK, HOW MANY TIMES DO YOU TALK ON THE PHONE WITH FAMILY, FRIENDS, OR NEIGHBORS?: THREE TIMES A WEEK
ARE YOU MARRIED, WIDOWED, DIVORCED, SEPARATED, NEVER MARRIED, OR LIVING WITH A PARTNER?: PATIENT DECLINED
HOW OFTEN DO YOU ATTENT MEETINGS OF THE CLUB OR ORGANIZATION YOU BELONG TO?: NEVER
WITHIN THE LAST YEAR, HAVE YOU BEEN HUMILIATED OR EMOTIONALLY ABUSED IN OTHER WAYS BY YOUR PARTNER OR EX-PARTNER?: NO
HOW OFTEN DO YOU GET TOGETHER WITH FRIENDS OR RELATIVES?: THREE TIMES A WEEK
DO YOU BELONG TO ANY CLUBS OR ORGANIZATIONS SUCH AS CHURCH GROUPS UNIONS, FRATERNAL OR ATHLETIC GROUPS, OR SCHOOL GROUPS?: NO
WITHIN THE LAST YEAR, HAVE YOU BEEN KICKED, HIT, SLAPPED, OR OTHERWISE PHYSICALLY HURT BY YOUR PARTNER OR EX-PARTNER?: NO
WITHIN THE LAST YEAR, HAVE YOU BEEN AFRAID OF YOUR PARTNER OR EX-PARTNER?: NO
HOW HARD IS IT FOR YOU TO PAY FOR THE VERY BASICS LIKE FOOD, HOUSING, MEDICAL CARE, AND HEATING?: NOT HARD AT ALL
WITHIN THE LAST YEAR, HAVE TO BEEN RAPED OR FORCED TO HAVE ANY KIND OF SEXUAL ACTIVITY BY YOUR PARTNER OR EX-PARTNER?: NO

## 2022-03-08 ASSESSMENT — PATIENT HEALTH QUESTIONNAIRE - PHQ9
SUM OF ALL RESPONSES TO PHQ9 QUESTIONS 1 & 2: 0
DEPRESSION UNABLE TO ASSESS: YES
2. FEELING DOWN, DEPRESSED OR HOPELESS: NOT AT ALL
1. LITTLE INTEREST OR PLEASURE IN DOING THINGS: NOT AT ALL

## 2022-03-08 ASSESSMENT — LIFESTYLE VARIABLES
HOW OFTEN DO YOU HAVE A DRINK CONTAINING ALCOHOL: NEVER
HOW MANY STANDARD DRINKS CONTAINING ALCOHOL DO YOU HAVE ON A TYPICAL DAY: PATIENT DECLINED

## 2022-03-08 ASSESSMENT — PAIN SCALES - GENERAL: PAINLEVEL_OUTOF10: 0

## 2022-03-08 NOTE — LETTER
[place] [employer] Medicaid  CERTIFICATION OF NECESSITY  FOR NON-EMERGENCY TRANSPORTATION   BY GROUND AMBULANCE      Individual Information   1. Name: Jazmín Meng 2. [place] Medicaid Billing Number: [id number]   8. Address: [address]      Transportation Provider Information   4. Provider Name: DAVIDA   5. [place] Provider Number: [id number] National Provider Identifier (NPI): [id number]     Certification  7. Criteria:  During transport, this individual requires:  [x] Medical treatment or continuous     supervision by an EMT. [x] The administration or regulation of oxygen by another person. [] Supervised protective restraint. 8. Period Beginning Date: 3/17/2022   9. Length  [x] Not more than 1 day(s)  [] One Year     Additional Information Relevant to Certification   10. Comments or Explanations, If Necessary or Appropriate     Deconditioned, debilitated, high risk for falls, to ltach, cardiac monitor, oxygen      Certifying Practitioner Information   11. Name of Practitioner: Dr. Micki Arce   12. [place] Medicaid Provider Number, If Applicable:  [address] Provider Identifier (NPI): [id number]     Signature Information   14. Date of Signature: 3/17/2022 15. Name of Person Signing: Ari Genao RN-BC   16. Signature and Professional Designation: Electronically signed by Ari Genao RN on 3/17/2022 at 9:03 AM       OD 41243  Rev. 7/2015           [facility] Encounter Date/Time: 3/8/2022 [address] [facility] Account: [de-identified]    MRN: [id number]    Patient: Martina Boles Serial #: [id number]      ENCOUNTER          Patient Class: I Private Enc?   No Unit RM BD: Keturah Solares [address] Service: MED   Encounter DX: Sepsis, unspecified orga*   ADM Provider: Micki Arce DO   Procedure:     ATT Provider: Micki Arce DO   REF Provider: Marquis Raymundo      Admission DX: Sepsis, unspecified organism, Sepsis ([facility] [address] and DX codes: A41.9, A41.9      PATIENT Name: Ugo Walton :  (76 yrs)   Address:  [facility] Sex: Female   [place] city: [facility] [address]         Marital Status:    Employer: RETIRED         Cheondoism: Unknown   Primary Care Provider: Benja Shearer MD         Primary Phone: [phone]   EMERGENCY CONTACT   Contact Name Legal Guardian? Relationship to Patient Home Phone Work Phone   1. Young Ros  2. Cheleonel Otterica    Other  Child [phone]              GUARANTOR            Guarantor: Ugo Walton     : 1945   Address: [place] [address] Sex: Female     [place] [address]     Relation to Patient: Self       Home Phone: [phone]   Guarantor ID: [id number]       Work Phone:     Guarantor Employer: UNKNOWN         Status: UNKNOWN      COVERAGE        PRIMARY INSURANCE   Payor: Scotty Garza Plan: Milagros KUNZ*   Payor Address: [facility], [address] [place] [address]       Group [place]: [id number] Type: INDEMNITY   Subscriber Name: Edmar Amaral : 1945   Subscriber ID: [id number] Pat. Rel. to Sub: Self   SECONDARY INSURANCE   Payor:   Plan:     Payor Address:  ,           Group Number:   Insurance Type:     Subscriber Name:   Subscriber :     Subscriber ID:   Pat.  Rel. to Sub:             CSN: [id number]

## 2022-03-09 ENCOUNTER — APPOINTMENT (OUTPATIENT)
Dept: CT IMAGING | Age: 77
DRG: 862 | End: 2022-03-09
Attending: INTERNAL MEDICINE
Payer: COMMERCIAL

## 2022-03-09 ENCOUNTER — APPOINTMENT (OUTPATIENT)
Dept: GENERAL RADIOLOGY | Age: 77
DRG: 862 | End: 2022-03-09
Attending: INTERNAL MEDICINE
Payer: COMMERCIAL

## 2022-03-09 LAB
ABO/RH: NORMAL
ALBUMIN SERPL-MCNC: 2.1 G/DL (ref 3.5–5.2)
ALP BLD-CCNC: 165 U/L (ref 35–104)
ALT SERPL-CCNC: 101 U/L (ref 0–32)
ANION GAP SERPL CALCULATED.3IONS-SCNC: 9 MMOL/L (ref 7–16)
ANTIBODY SCREEN: NORMAL
AST SERPL-CCNC: 93 U/L (ref 0–31)
BASOPHILS ABSOLUTE: 0.21 E9/L (ref 0–0.2)
BASOPHILS RELATIVE PERCENT: 1.7 % (ref 0–2)
BILIRUB SERPL-MCNC: 0.4 MG/DL (ref 0–1.2)
BLOOD BANK DISPENSE STATUS: NORMAL
BLOOD BANK PRODUCT CODE: NORMAL
BPU ID: NORMAL
BUN BLDV-MCNC: 14 MG/DL (ref 6–23)
C-REACTIVE PROTEIN: 31.9 MG/DL (ref 0–0.4)
CALCIUM SERPL-MCNC: 8.7 MG/DL (ref 8.6–10.2)
CHLORIDE BLD-SCNC: 117 MMOL/L (ref 98–107)
CO2: 25 MMOL/L (ref 22–29)
CREAT SERPL-MCNC: 0.4 MG/DL (ref 0.5–1)
CREATININE URINE: 32 MG/DL (ref 29–226)
DESCRIPTION BLOOD BANK: NORMAL
EOSINOPHIL, URINE: 0 % (ref 0–1)
EOSINOPHILS ABSOLUTE: 0.43 E9/L (ref 0.05–0.5)
EOSINOPHILS RELATIVE PERCENT: 3.5 % (ref 0–6)
GFR AFRICAN AMERICAN: >60
GFR NON-AFRICAN AMERICAN: >60 ML/MIN/1.73
GLUCOSE BLD-MCNC: 81 MG/DL (ref 74–99)
HCT VFR BLD CALC: 20.2 % (ref 34–48)
HCT VFR BLD CALC: 27.4 % (ref 34–48)
HCT VFR BLD CALC: 30.6 % (ref 34–48)
HEMOGLOBIN: 6.3 G/DL (ref 11.5–15.5)
HEMOGLOBIN: 8.5 G/DL (ref 11.5–15.5)
HEMOGLOBIN: 9.5 G/DL (ref 11.5–15.5)
HYPOCHROMIA: ABNORMAL
INR BLD: 1.4
LYMPHOCYTES ABSOLUTE: 1.46 E9/L (ref 1.5–4)
LYMPHOCYTES RELATIVE PERCENT: 12.2 % (ref 20–42)
MAGNESIUM: 1.9 MG/DL (ref 1.6–2.6)
MCH RBC QN AUTO: 32 PG (ref 26–35)
MCHC RBC AUTO-ENTMCNC: 31 % (ref 32–34.5)
MCV RBC AUTO: 103 FL (ref 80–99.9)
MONOCYTES ABSOLUTE: 0.85 E9/L (ref 0.1–0.95)
MONOCYTES RELATIVE PERCENT: 7 % (ref 2–12)
MYELOCYTE PERCENT: 0.9 % (ref 0–0)
NEUTROPHILS ABSOLUTE: 9.27 E9/L (ref 1.8–7.3)
NEUTROPHILS RELATIVE PERCENT: 74.8 % (ref 43–80)
NUCLEATED RED BLOOD CELLS: 0.9 /100 WBC
OSMOLALITY URINE: 546 MOSM/KG (ref 300–900)
OSMOLALITY: 318 MOSM/KG (ref 285–310)
PDW BLD-RTO: 14.3 FL (ref 11.5–15)
PHOSPHORUS: 2.7 MG/DL (ref 2.5–4.5)
PLATELET # BLD: 279 E9/L (ref 130–450)
PMV BLD AUTO: 10.5 FL (ref 7–12)
POLYCHROMASIA: ABNORMAL
POTASSIUM SERPL-SCNC: 3.7 MMOL/L (ref 3.5–5)
PRO-BNP: 1292 PG/ML (ref 0–450)
PROCALCITONIN: 0.24 NG/ML (ref 0–0.08)
PROTHROMBIN TIME: 15.8 SEC (ref 9.3–12.4)
RBC # BLD: 2.97 E12/L (ref 3.5–5.5)
SEDIMENTATION RATE, ERYTHROCYTE: 109 MM/HR (ref 0–20)
SODIUM BLD-SCNC: 151 MMOL/L (ref 132–146)
TOTAL PROTEIN: 5.7 G/DL (ref 6.4–8.3)
WBC # BLD: 12.2 E9/L (ref 4.5–11.5)

## 2022-03-09 PROCEDURE — 2580000003 HC RX 258: Performed by: INTERNAL MEDICINE

## 2022-03-09 PROCEDURE — 85018 HEMOGLOBIN: CPT

## 2022-03-09 PROCEDURE — 2060000000 HC ICU INTERMEDIATE R&B

## 2022-03-09 PROCEDURE — 84145 PROCALCITONIN (PCT): CPT

## 2022-03-09 PROCEDURE — 86900 BLOOD TYPING SEROLOGIC ABO: CPT

## 2022-03-09 PROCEDURE — 85025 COMPLETE CBC W/AUTO DIFF WBC: CPT

## 2022-03-09 PROCEDURE — 6370000000 HC RX 637 (ALT 250 FOR IP): Performed by: INTERNAL MEDICINE

## 2022-03-09 PROCEDURE — 6360000002 HC RX W HCPCS: Performed by: INTERNAL MEDICINE

## 2022-03-09 PROCEDURE — 94150 VITAL CAPACITY TEST: CPT

## 2022-03-09 PROCEDURE — 36415 COLL VENOUS BLD VENIPUNCTURE: CPT

## 2022-03-09 PROCEDURE — 80053 COMPREHEN METABOLIC PANEL: CPT

## 2022-03-09 PROCEDURE — C9113 INJ PANTOPRAZOLE SODIUM, VIA: HCPCS | Performed by: INTERNAL MEDICINE

## 2022-03-09 PROCEDURE — 84100 ASSAY OF PHOSPHORUS: CPT

## 2022-03-09 PROCEDURE — 86901 BLOOD TYPING SEROLOGIC RH(D): CPT

## 2022-03-09 PROCEDURE — 85014 HEMATOCRIT: CPT

## 2022-03-09 PROCEDURE — 99222 1ST HOSP IP/OBS MODERATE 55: CPT | Performed by: INTERNAL MEDICINE

## 2022-03-09 PROCEDURE — 85610 PROTHROMBIN TIME: CPT

## 2022-03-09 PROCEDURE — 83735 ASSAY OF MAGNESIUM: CPT

## 2022-03-09 PROCEDURE — 2500000003 HC RX 250 WO HCPCS: Performed by: INTERNAL MEDICINE

## 2022-03-09 PROCEDURE — P9047 ALBUMIN (HUMAN), 25%, 50ML: HCPCS | Performed by: NURSE PRACTITIONER

## 2022-03-09 PROCEDURE — 2580000003 HC RX 258: Performed by: NURSE PRACTITIONER

## 2022-03-09 PROCEDURE — 86923 COMPATIBILITY TEST ELECTRIC: CPT

## 2022-03-09 PROCEDURE — 71045 X-RAY EXAM CHEST 1 VIEW: CPT

## 2022-03-09 PROCEDURE — 74176 CT ABD & PELVIS W/O CONTRAST: CPT

## 2022-03-09 PROCEDURE — P9016 RBC LEUKOCYTES REDUCED: HCPCS

## 2022-03-09 PROCEDURE — 99221 1ST HOSP IP/OBS SF/LOW 40: CPT | Performed by: SURGERY

## 2022-03-09 PROCEDURE — 6360000002 HC RX W HCPCS: Performed by: NURSE PRACTITIONER

## 2022-03-09 PROCEDURE — 86850 RBC ANTIBODY SCREEN: CPT

## 2022-03-09 PROCEDURE — 83880 ASSAY OF NATRIURETIC PEPTIDE: CPT

## 2022-03-09 RX ORDER — POTASSIUM CHLORIDE AND SODIUM CHLORIDE 900; 300 MG/100ML; MG/100ML
INJECTION, SOLUTION INTRAVENOUS CONTINUOUS
Status: DISCONTINUED | OUTPATIENT
Start: 2022-03-09 | End: 2022-03-12

## 2022-03-09 RX ORDER — ALBUMIN (HUMAN) 12.5 G/50ML
50 SOLUTION INTRAVENOUS EVERY 4 HOURS
Status: DISPENSED | OUTPATIENT
Start: 2022-03-09 | End: 2022-03-10

## 2022-03-09 RX ORDER — PETROLATUM 420 MG/G
OINTMENT TOPICAL 3 TIMES DAILY PRN
Status: DISCONTINUED | OUTPATIENT
Start: 2022-03-09 | End: 2022-03-22 | Stop reason: HOSPADM

## 2022-03-09 RX ORDER — POLYETHYLENE GLYCOL 3350, SODIUM CHLORIDE, SODIUM BICARBONATE, POTASSIUM CHLORIDE 420; 11.2; 5.72; 1.48 G/4L; G/4L; G/4L; G/4L
4000 POWDER, FOR SOLUTION ORAL ONCE
Status: DISCONTINUED | OUTPATIENT
Start: 2022-03-09 | End: 2022-03-14

## 2022-03-09 RX ORDER — PANTOPRAZOLE SODIUM 40 MG/10ML
40 INJECTION, POWDER, LYOPHILIZED, FOR SOLUTION INTRAVENOUS 2 TIMES DAILY
Status: DISCONTINUED | OUTPATIENT
Start: 2022-03-09 | End: 2022-03-22 | Stop reason: HOSPADM

## 2022-03-09 RX ORDER — PETROLATUM 420 MG/G
OINTMENT TOPICAL 2 TIMES DAILY
Status: DISCONTINUED | OUTPATIENT
Start: 2022-03-09 | End: 2022-03-22 | Stop reason: HOSPADM

## 2022-03-09 RX ORDER — 0.9 % SODIUM CHLORIDE 0.9 %
500 INTRAVENOUS SOLUTION INTRAVENOUS ONCE
Status: COMPLETED | OUTPATIENT
Start: 2022-03-09 | End: 2022-03-09

## 2022-03-09 RX ORDER — SODIUM CHLORIDE 9 MG/ML
INJECTION, SOLUTION INTRAVENOUS PRN
Status: DISCONTINUED | OUTPATIENT
Start: 2022-03-09 | End: 2022-03-22 | Stop reason: HOSPADM

## 2022-03-09 RX ADMIN — FLUCONAZOLE IN SODIUM CHLORIDE 200 MG: 2 INJECTION, SOLUTION INTRAVENOUS at 10:11

## 2022-03-09 RX ADMIN — VANCOMYCIN HYDROCHLORIDE 1000 MG: 1 INJECTION, POWDER, LYOPHILIZED, FOR SOLUTION INTRAVENOUS at 16:36

## 2022-03-09 RX ADMIN — ALBUMIN (HUMAN) 50 G: 0.25 INJECTION, SOLUTION INTRAVENOUS at 21:25

## 2022-03-09 RX ADMIN — ANTI-FUNGAL POWDER MICONAZOLE NITRATE TALC FREE: 1.42 POWDER TOPICAL at 23:38

## 2022-03-09 RX ADMIN — PANTOPRAZOLE SODIUM 40 MG: 40 INJECTION, POWDER, FOR SOLUTION INTRAVENOUS at 21:16

## 2022-03-09 RX ADMIN — ALBUMIN (HUMAN) 50 G: 0.25 INJECTION, SOLUTION INTRAVENOUS at 16:20

## 2022-03-09 RX ADMIN — PIPERACILLIN SODIUM AND TAZOBACTAM SODIUM 3375 MG: 3; 375 INJECTION, POWDER, FOR SOLUTION INTRAVENOUS at 21:31

## 2022-03-09 RX ADMIN — POTASSIUM CHLORIDE AND SODIUM CHLORIDE: 900; 300 INJECTION, SOLUTION INTRAVENOUS at 13:41

## 2022-03-09 RX ADMIN — SODIUM CHLORIDE 500 ML: 9 INJECTION, SOLUTION INTRAVENOUS at 17:11

## 2022-03-09 RX ADMIN — PETROLATUM: 420 OINTMENT TOPICAL at 23:39

## 2022-03-09 RX ADMIN — PIPERACILLIN SODIUM AND TAZOBACTAM SODIUM 3375 MG: 3; 375 INJECTION, POWDER, FOR SOLUTION INTRAVENOUS at 05:42

## 2022-03-09 RX ADMIN — HEPARIN SODIUM 5000 UNITS: 5000 INJECTION INTRAVENOUS; SUBCUTANEOUS at 05:37

## 2022-03-09 RX ADMIN — SODIUM CHLORIDE: 9 INJECTION, SOLUTION INTRAVENOUS at 09:33

## 2022-03-09 RX ADMIN — HEPARIN SODIUM 5000 UNITS: 5000 INJECTION INTRAVENOUS; SUBCUTANEOUS at 13:45

## 2022-03-09 RX ADMIN — PANTOPRAZOLE SODIUM 40 MG: 40 INJECTION, POWDER, FOR SOLUTION INTRAVENOUS at 10:09

## 2022-03-09 RX ADMIN — PIPERACILLIN SODIUM AND TAZOBACTAM SODIUM 3375 MG: 3; 375 INJECTION, POWDER, FOR SOLUTION INTRAVENOUS at 13:47

## 2022-03-09 ASSESSMENT — PAIN SCALES - GENERAL
PAINLEVEL_OUTOF10: 0

## 2022-03-09 ASSESSMENT — PAIN SCALES - PAIN ASSESSMENT IN ADVANCED DEMENTIA (PAINAD)
BREATHING: 0
CONSOLABILITY: 0
BODYLANGUAGE: 0
NEGVOCALIZATION: 0
FACIALEXPRESSION: 0
TOTALSCORE: 0

## 2022-03-09 NOTE — PLAN OF CARE
Received request for GI consult in regards to rectal bleed. Patient apparently presented with issues with her PEG tube ( placed 3/3) with plan for replacement tomorrow. At this time patient does not have a way to receive prep for colonoscopy. I have discussed with nursing and I will check with general surgery if okay to place an NG tube or other means of enteral access to give prep for colonoscopy.     Jacobo Resendez MD

## 2022-03-09 NOTE — PROGRESS NOTES
Admitted pt to room #616-2, noted bilateral heels blanchable pink, buttocks  Noted a large urine burn 10cm x 2cm (one on each buttock) peg tube draining moderate large amount pink red green drainage,   Peg tube site large open and dk red, 3cm dark red indurated around peg tube opening. finger nails long and broken with debris under them.  Mouth very crusted and dry

## 2022-03-09 NOTE — CONSULTS
GENERAL SURGERY  CONSULT NOTE  3/9/2022    Physician Consulted: Dr. Cuate Arvizu  Reason for Consult: PEG tube infection      HPI  Alona Branham is a 67 y.o. female who presents for evaluation of PEG tube. Patient had PEG tube placed on 3/3. Presented to [facility] and was transferred over here for evaluation of her PEG tube. It is 1/2 cm from skin. There is mild erythema and swelling without any significant drainage. Patient is altered and unable to answer questions at this time. Past Medical History:   Diagnosis Date    Allergic     Anxiety disorder     Arthritis     Difficulty walking     Dysphagia     Hyperlipidemia     Hypertension     Interstitial cystitis     Lack of coordination     Mitral valve prolapse     pt. has a mitral valve prolapse    Neuromuscular disorder (HCC)     Other disorders of kidney and ureter in diseases classified elsewhere     Pelvis fracture (HCC)     multiple fxs pelvis    Psychiatric problem     Thyroid disease     Trigeminal neuralgia     Unspecified dementia without behavioral disturbance (HCC)        Past Surgical History:   Procedure Laterality Date    FRACTURE SURGERY      GASTROSTOMY TUBE PLACEMENT N/A 3/3/2022    EGD PEG TUBE PLACEMENT performed by Odin Pennington MD at [address] [place] [address]        Medications Prior to Admission:    Prior to Admission medications    Medication Sig Start Date End Date Taking?  Authorizing Provider   vitamin D (CHOLECALCIFEROL) 25 MCG (1000 UT) TABS tablet Take 1,000 Units by mouth daily   Yes Historical Provider, MD   paliperidone (INVEGA) 1.5 MG extended release tablet Take 1.5 mg by mouth every morning   Yes Historical Provider, MD   sertraline (ZOLOFT) 100 MG tablet Take 100 mg by mouth daily   Yes Historical Provider, MD   buPROPion (WELLBUTRIN SR) 100 MG extended release tablet Take 100 mg by mouth 2 times daily   Yes Historical Provider, MD   melatonin 5 MG TBDP disintegrating tablet Take 5 mg by mouth nightly   Yes Historical Provider, MD   levothyroxine (SYNTHROID) 50 MCG tablet Take 50 mcg by mouth Daily. Yes Historical Provider, MD   acetaminophen (TYLENOL) 325 MG tablet Take 650 mg by mouth every 4 hours as needed for Pain or Fever. Yes Historical Provider, MD   famotidine (PEPCID) 40 MG tablet Take 40 mg by mouth daily    Historical Provider, MD   cyanocobalamin 1000 MCG/ML injection Inject 1,000 mcg into the muscle once    Historical Provider, MD   fluconazole (DIFLUCAN) 200 MG tablet Take 200 mg by mouth daily    Historical Provider, MD       Allergies   Allergen Reactions    Abilify [Aripiprazole]     Depakote [Divalproex Sodium]     Dye [Iodides] Hives     Pt. Is allergic to CT dye. Breaks out in hive.s  Pt. Is allergic to CT dye. Breaks out in hives. Pt. Gets pre-medicated with benadryl prior to CT.  Geodon [Ziprasidone Hcl]     Levsin [Hyoscyamine]     Lithium     Neurontin [Gabapentin]     Risperidone And Related     Tegretol [Carbamazepine]     Tramadol     Trileptal [Oxcarbazepine]     Zyprexa [Olanzapine]        Family History   Problem Relation Age of Onset    Cancer Mother     High Blood Pressure Mother     Cancer Sister     High Blood Pressure Brother     High Cholesterol Brother     Stroke Maternal Grandmother     Learning Disabilities Son        Social History     Tobacco Use    Smoking status: Never Smoker    Smokeless tobacco: Not on file   Vaping Use    Vaping Use: Never used   Substance Use Topics    Alcohol use: No    Drug use: No         Review of Systems   Chart was reviewed, given patient's mental status unable to discuss with patient    PHYSICAL EXAM:    Vitals:    03/09/22 0545   BP: 130/78   Pulse: 105   Resp: 18   Temp: 97.8 °F (36.6 °C)   SpO2: 97%       General Appearance:  awake,  in no acute distress  Skin:  Skin color, texture, turgor normal. No rashes or lesions.   Head/face:  NCAT  Eyes:  PERRL  Lungs:  No chest wall tenderness. Heart:  Heart regular rate and rhythm  Abdomen: Soft, mildly tender around PEG tube site. Nondistended. PEG tube at 1.5 cm from skin. Mild erythremia and area of edema/flocculation. Extremities: pulses present in all extremities    LABS:    CBC  Recent Labs     03/09/22  0632   WBC 12.2*   HGB 9.5*   HCT 30.6*        BMP  No results for input(s): NA, K, CL, CO2, BUN, CREATININE, GLU, CALCIUM in the last 72 hours. Liver Function  No results for input(s): AMYLASE, LIPASE, BILITOT, BILIDIR, AST, ALT, ALKPHOS, PROT, LABALBU in the last 72 hours. No results for input(s): LACTATE in the last 72 hours. No results for input(s): INR, PTT in the last 72 hours. Invalid input(s): PT    RADIOLOGY    No results found. ASSESSMENT:  67 y.o. female with possible PEG tube infection    PLAN:  Pt seen and examined   CT to ensure that PEG tube is still in place  Does appear to be a little bit of erythema around PEG tube may be skin infection  We will follow up CT imaging    Electronically signed by Winter Romero DO on 3/9/22 at 7:22 AM EST    General Surgery Consult Note  Cheyenne Abdul MD, MS    Patient's Name/Date of Birth: Be Bar [birth year]    Date: March 9, 2022     Surgeon: Lisandra Andrea MD    Requesting Physician:     Chief Complaint: PEG dislodgement    Patient Active Problem List   Diagnosis    Odontoid fracture ([address]    Hypothyroid    Multiple rib fractures    TMJ (temporomandibular joint syndrome)    Bipolar disorder ([address]    Sciatica    Hiatal hernia    Abnormality of gait and mobility    Trigeminal neuralgia    Headache    Cervical neck pain with evidence of disc disease    Vomiting    Hypokalemia    Intra-abdominal infection       Subjective: As above. I saw and examined the patient and discussed the above HPI with the resident and agree with documented positive and negative findings.  Timing is constant, radiation to midline, alleviated by none and started unk and severity is 7/10. Objective:  /64   Pulse 106   Temp 99 °F (37.2 °C)   Resp 18   Ht 4' 11\" (1.499 m)   Wt 115 lb 14.4 oz (52.6 kg)   SpO2 97%   BMI 23.41 kg/m²   Labs:  Reviewed by me and relevant abnormalities noted       A complete 10 system review was performed and are otherwise negative unless mentioned in the above HPI. Specific negatives are listed below but may not include all those reviewed.     General ROS: negative obtundation, AMS  ENT ROS: negative rhinorrhea, epistaxis  Allergy and Immunology ROS: negative itchy/watery eyes or nasal congestion  Hematological and Lymphatic ROS: negative spontaneous bleeding or bruising  Endocrine ROS: negative  lethargy, mood swings, palpitations or polydipsia/polyuria  Respiratory ROS: negative sputum changes, stridor, tachypnea or wheezing  Cardiovascular ROS: negative for - loss of consciousness, murmur or orthopnea  Gastrointestinal ROS: negative for - hematochezia or hematemesis  Genito-Urinary ROS: negative for -  genital discharge or hematuria  Musculoskeletal ROS: negative for - focal weakness, gangrene  Psych/Neuro ROS: negative for - visual or auditory hallucinations, suicidal ideation    Physical exam:   /64   Pulse 106   Temp 99 °F (37.2 °C)   Resp 18   Ht 4' 11\" (1.499 m)   Wt 115 lb 14.4 oz (52.6 kg)   SpO2 97%   BMI 23.41 kg/m²   General appearance:  NAD, appears stated age  Head: NCAT, PERRLA, EOMI, red conjunctiva  Neck: supple, no masses, trachea midline  Lungs: Equal chest rise bilateral, no retractions, no wheezing  Heart: Reg rate  Abdomen: soft, cellulitis and erythema surrounding PEG tube site which is at 1 cm at the skin and likely dislodged  Skin; warm and dry, no cyanosis  Gu: no cva tenderness  Extremities: atraumatic, no focal motor deficits, no open wounds  Psych: No tremor, visual hallucinations        Radiology: I reviewed relevant abdominal imaging from this admission and that available in the EMR including CT abd/pel from today. My assessment is PEG tube dislodgment with underlying fluid collection    Assessment/Plan:  Betsy Moss is a 67 y.o. female malfunctioning PEG tube dislodgment  Patient Active Problem List   Diagnosis    Odontoid fracture ([address]    Hypothyroid    Multiple rib fractures    TMJ (temporomandibular joint syndrome)    Bipolar disorder (HCC)    Sciatica    Hiatal hernia    Abnormality of gait and mobility    Trigeminal neuralgia    Headache    Cervical neck pain with evidence of disc disease    Vomiting    Hypokalemia    Intra-abdominal infection       N.p.o.  Proceed to replacement of PEG tube tomorrow with possible incision and drainage of PEG tube site  Antibiotics IV  IV fluid resuscitation  I have personally participated in a face-to-face history and physical exam on the date of service. Reviewed chart, vitals, labs and radiologic studies. I also participated in medical decision making with the resident/NP on the date of service and I agree with all of the pertinent clinical information, assessment and treatment plan. I have reviewed and edited the note above based on my findings during my history, exam, and decision making. NOTE: This report, in part or full, may have been transcribed using voice recognition software. Every effort was made to ensure accuracy; however, inadvertent computerized transcription errors may be present. Please excuse any transcriptional grammatical or spelling errors that may have escaped my editorial review.     Physician Signature: Electronically signed by Dr. Юлия Ferrer  [phone] (p)  3/9/2022  10:24 AM

## 2022-03-09 NOTE — PROGRESS NOTES
Pt pulled peg tube out, no bleeding noted - cleansed and dressed with dry dressing.    Large amount of clotted blood between pts legs - rectum dilated noted large stool - pt is unable to pass   Physician notified

## 2022-03-09 NOTE — CONSULTS
[facility]  Consult Note    [address] [facility] [address]  [facility], [address]  Phone [phone]   [phone]      Admit Date: 3/8/2022  6:25 PM  Pt Name: Tina Jane  MRN: [id number]  : 1945  Reason for Consult:  No chief complaint on file. Requesting Physician:  Krysta Parker DO  PCP: Jeovany Lincoln MD  History Obtained From:  patient, chart   ID consulted for Sepsis, unspecified organism [A41.9]  on hospital day 1  CHIEF COMPLAINT     No chief complaint on file. HISTORYOF PRESENT ILLNESS   Tina Jane is a 67 y.o. female who presents with   has a past medical history of Allergic, Anxiety disorder, Arthritis, Difficulty walking, Dysphagia, Hyperlipidemia, Hypertension, Interstitial cystitis, Lack of coordination, Mitral valve prolapse, Neuromuscular disorder ([address] Other disorders of kidney and ureter in diseases classified elsewhere, Pelvis fracture ([address] Psychiatric problem, Thyroid disease, Trigeminal neuralgia, and Unspecified dementia without behavioral disturbance ([address] ED TRIAGEVITALS  BP: (!) 92/57, Temp: 97.2 °F (36.2 °C), Pulse: 101, Resp: 16, SpO2: 97 %  HPI   Pt is s/p peg on 3/3/2022  Pt came in from [facility] DISLODGED PEG and was transferred over here for evaluation of her PEG tube. CT a/p   1. Malpositioned gastrostomy tube with findings suggesting possible phlegmon/early abscess formation with gas in adjacent soft tissues. Can correlate with the clinical situation. 2. Left pelvic and vertebral body compression fractures as described above. 3. Interstitial lung changes as discussed above. 4. Large hiatal hernia. 5. Left renal pelvis calcification. 6. Large amount of stool throughout the colon particularly the distal colon/rectum suggesting fecal impaction. 7. Possible gallbladder sludge.     Pt had erythema and swelling  LABS:  22 12:55: SARS-CoV-2 NEG   22 13:00: WBC 13.8 , Hgb 10.3 , Hct 32.1, Plt Count 328   03/08/22 13:00: PT 12.0, INR 1.1  03/08/22 13:00: Sodium 150 H, Potassium 3.6, Chloride 109, Carbon Dioxide 28, Anion Gap 13, BUN 18, Creatinine 0.58 L, Estimated Creat Clear 48, Est GFR ( Amer) > 60, Est GFR (Non-Af Amer) > 60, BUN/Creatinine Ratio 31 H, Glucose 138 H, Calcium 9.7, Total Bilirubin 0.9,  H,  H, Alkaline Phosphatase 159 H, Total Protein 5.3 L, Albumin 1.8 L, Globulin 4, Albumin/Globulin Ratio 0.45 L, Procalcitonin 0.45 H  03/08/22 13:00: Lactic Acid 1.4  03/08/22 13:00: Urine Color Yellow, Urine Clarity Slightly cloudy, Urine pH 8.0 H, Ur Specific Gravity 1.025, Urine Protein 2+ A, Urine Glucose (UA) Negative, Urine Ketones Negative, Urine Blood 3+ A, Urine Nitrate Negative, Urine Bilirubin Negative, Urine Urobilinogen 4.0 A, Ur Leukocyte Esterase Large, Urine RBC 9-30 A, Urine WBC >100 A, Ur Squamous Epith Cells 0-5, Urine Bacteria Moderate A  Medications Ordered in ER  Ceftriaxone Sodium 1 gm/ (Sodium Chloride)  50 mls @ 100 mls/hr IV ONCE ONE    Admitted with PEG tube infection/MRSA bacteremia   Has blood clots per rectum  Afebrile 2L  Spoke with family they are concerned about recurrent UTI     REVIEW OF SYSTEMS     CONSTITUTIONAL:   No fever, chills, weight loss  ALLERGIES:    No urticaria, hay fever,    EYES:     No blurry vision, loss of vision, eye pain  ENT:      No hearing loss, sore throat  CARDIOVASCULAR:  No chest pain or palpitations  RESPIRATORY:   No cough, sob  ENDOCRINE:    No increase thirst, urination   HEME-LYMPH:   No easy bruising or bleeding  GI:     No nausea, vomiting or diarrhea  :     No urinary complaints  NEURO:    No seizures, stroke, HA  MUSCULOSKELETAL:  No muscle aches or pain, no joint pain  SKIN:     No rash or itch  PSYCH:    No depression or anxiety    Medications Prior to Admission: vitamin D (CHOLECALCIFEROL) 25 MCG (1000 UT) TABS tablet, Take 1,000 Units by mouth daily  paliperidone (INVEGA) 1.5 MG extended release tablet, Take 1.5 mg by mouth every morning  sertraline (ZOLOFT) 100 MG tablet, Take 100 mg by mouth daily  buPROPion (WELLBUTRIN SR) 100 MG extended release tablet, Take 100 mg by mouth 2 times daily  melatonin 5 MG TBDP disintegrating tablet, Take 5 mg by mouth nightly  levothyroxine (SYNTHROID) 50 MCG tablet, Take 50 mcg by mouth Daily. acetaminophen (TYLENOL) 325 MG tablet, Take 650 mg by mouth every 4 hours as needed for Pain or Fever.   famotidine (PEPCID) 40 MG tablet, Take 40 mg by mouth daily  cyanocobalamin 1000 MCG/ML injection, Inject 1,000 mcg into the muscle once  fluconazole (DIFLUCAN) 200 MG tablet, Take 200 mg by mouth daily  CURRENT MEDICATIONS     Current Facility-Administered Medications:     pantoprazole (PROTONIX) injection 40 mg, 40 mg, IntraVENous, BID, Idella Gain, DO    0.9% NaCl with KCl 40 mEq infusion, , IntraVENous, Continuous, JOHN Whalen CNP, Last Rate: 100 mL/hr at 03/09/22 1632, Rate Change at 03/09/22 1632    vancomycin (VANCOCIN) 1,000 mg in dextrose 5 % 250 mL IVPB, 1,000 mg, IntraVENous, Q12H, Idella Gain, DO, Last Rate: 250 mL/hr at 03/09/22 1636, 1,000 mg at 03/09/22 1636    0.9 % sodium chloride bolus, 500 mL, IntraVENous, Once, JOHN Whalen CNP, Last Rate: 967.7 mL/hr at 03/09/22 1711, 500 mL at 03/09/22 1711    albumin human 25 % IV solution 50 g, 50 g, IntraVENous, Q4H, JOHN Whalen CNP, Stopped at 03/09/22 1659    miconazole (MICOTIN) 2 % powder, , Topical, BID, Idella Gain, DO    Petrolatum 42 % OINT, , Topical, BID **AND** Petrolatum 42 % OINT, , Topical, TID PRN, Idella Gain, DO    piperacillin-tazobactam (ZOSYN) 3,375 mg in dextrose 5 % 50 mL IVPB extended infusion (mini-bag), 3,375 mg, IntraVENous, Q8H, Idella Gain, DO, Stopped at 03/09/22 1705    morphine (PF) injection 2 mg, 2 mg, IntraVENous, Q4H PRN, Idella Gain, DO    ondansetron [facility]) injection 4 mg, 4 mg, IntraVENous, Q6H PRN, Idella Gain, DO   fluconazole (DIFLUCAN) in 0.9 % sodium chloride IVPB 200 mg, 200 mg, IntraVENous, Q24H, Micki Arce DO, Last Rate: 100 mL/hr at 03/09/22 1011, 200 mg at 03/09/22 1011    albuterol (PROVENTIL) nebulizer solution 2.5 mg, 2.5 mg, Nebulization, Q6H PRN, Micki Arce DO    [Held by provider] heparin (porcine) injection 5,000 Units, 5,000 Units, SubCUTAneous, 3 times per day, Micki Arce DO, 5,000 Units at 03/09/22 1345  ALLERGIES     Abilify [aripiprazole], Depakote [divalproex sodium], Dye [iodides], Geodon [ziprasidone hcl], Levsin [hyoscyamine], Lithium, Neurontin [gabapentin], Risperidone and related, Tegretol [carbamazepine], Tramadol, Trileptal [oxcarbazepine], and Zyprexa [olanzapine]    There is no immunization history on file for this patient.    Internal Administration   First Dose      Second Dose           Last COVID Lab SARS-CoV-2 (no units)   Date Value   04/09/2021 Not Detected            PAST MEDICAL HISTORY     Past Medical History:   Diagnosis Date    Allergic     Anxiety disorder     Arthritis     Difficulty walking     Dysphagia     Hyperlipidemia     Hypertension     Interstitial cystitis     Lack of coordination     Mitral valve prolapse     pt. has a mitral valve prolapse    Neuromuscular disorder (HCC)     Other disorders of kidney and ureter in diseases classified elsewhere     Pelvis fracture (HCC)     multiple fxs pelvis    Psychiatric problem     Thyroid disease     Trigeminal neuralgia     Unspecified dementia without behavioral disturbance ([facility]      SURGICAL HISTORY       Past Surgical History:   Procedure Laterality Date    FRACTURE SURGERY      GASTROSTOMY TUBE PLACEMENT N/A 3/3/2022    EGD PEG TUBE PLACEMENT performed by Kyle Ackerman MD at [address]      FAMILY HISTORY       Family History   Problem Relation Age of Onset    Cancer Mother     High Blood Pressure Mother     Cancer Sister     High Blood Pressure Brother  High Cholesterol Brother     Stroke Maternal Grandmother     Learning Disabilities Son      SOCIAL HISTORY       Social History     Socioeconomic History    Marital status:      Spouse name: None    Number of children: 2    Years of education: 15    Highest education level: None   Occupational History    None   Tobacco Use    Smoking status: Never Smoker    Smokeless tobacco: None   Vaping Use    Vaping Use: Never used   Substance and Sexual Activity    Alcohol use: No    Drug use: No    Sexual activity: Never   Other Topics Concern    None   Social History Narrative    None     Social Determinants of Health     Financial Resource Strain: Low Risk     Difficulty of Paying Living Expenses: Not hard at all   Food Insecurity: No Food Insecurity    Worried About Running Out of Food in the Last Year: Never true    [place] of Food in the Last Year: Never true   Transportation Needs: No Transportation Needs    Lack of Transportation (Medical): No    Lack of Transportation (Non-Medical): No   Physical Activity: Inactive    Days of Exercise per Week: 0 days    Minutes of Exercise per Session: 0 min   Stress: No Stress Concern Present    Feeling of Stress : Not at all   Social Connections: Unknown    Frequency of Communication with Friends and Family: Three times a week    Frequency of Social Gatherings with Friends and Family:  Three times a week    Attends Baptism Services: Never    Active Member of Clubs or Organizations: No    Attends Club or Organization Meetings: Never    Marital Status: Patient refused   Intimate Partner Violence: Not At Risk    Fear of Current or Ex-Partner: No    Emotionally Abused: No    Physically Abused: No    Sexually Abused: No   Housing Stability: Low Risk     Unable to Pay for Housing in the Last Year: No    Number of [place] in the Last Year: 1    Unstable Housing in the Last Year: No     [address]       ED Triage Vitals [03/08/22 1830]   BP Temp Temp Source Pulse Resp SpO2 Height Weight   136/69 96.9 °F (36.1 °C) Infrared 120 22 97 % 4' 11\" (1.499 m) 115 lb (52.2 kg)       Vitals:    03/09/22 1215 03/09/22 1400 03/09/22 1442 03/09/22 1700   BP: 131/81 (!) 99/55 (!) 82/50 (!) 92/57   Pulse: 93 110 111 101   Resp: 20 23 18 16   Temp: 98 °F (36.7 °C) 98 °F (36.7 °C) 99.1 °F (37.3 °C) 97.2 °F (36.2 °C)   TempSrc: Infrared Infrared Axillary Infrared   SpO2: 97%  97%    Weight:       Height:         Physical Exam   Constitutional/General: Alert and oriented, NAD  Head: NC/AT  Eyes: PERRL, EOMI  Mouth: Normal mucosa, no thrush   Neck: Supple, full ROM,    Pulmonary: Lungs dec to auscultation bilaterally. Not in respiratory distress  Cardiovascular:  Regular rate and rhythm, no murmurs, gallops, or rubs. Abdomen: Soft, + BS. No distension. Nontender. rectal blood  Extremities: Moves all extremities x 4. Warm and well perfused edema  Pulses:  Distal pulses intact  Skin: Warm and dry without rash  Neurologic:    No focal deficits  Psych: Normal Affect  Carrera      DIAGNOSTIC RESULTS   RADIOLOGY:   CT ABDOMEN PELVIS WO CONTRAST Additional Contrast? None    Result Date: 3/9/2022  EXAMINATION: CT OF THE ABDOMEN AND PELVIS WITHOUT CONTRAST 3/9/2022 7:24 am TECHNIQUE: CT of the abdomen and pelvis was performed without the administration of intravenous contrast. Multiplanar reformatted images are provided for review. Dose modulation, iterative reconstruction, and/or weight based adjustment of the mA/kV was utilized to reduce the radiation dose to as low as reasonably achievable. COMPARISON: None HISTORY: ORDERING SYSTEM PROVIDED HISTORY: ? dislodged PEG TECHNOLOGIST PROVIDED HISTORY: Reason for exam:->? dislodged PEG Additional Contrast?->None FINDINGS: Lower Chest: Heart size is within normal limits. Coronary artery calcifications are noted, potentially a marker for coronary artery disease. A small to moderate hiatal hernia is present.   Dependent atelectasis along with mild-to-moderate fibrotic changes are noted in the lung bases bilaterally. Organs: The unenhanced liver and gallbladder are normal in appearance, aside from mild biliary sludge. The spleen and adrenal glands are normal in size. The unenhanced pancreas is grossly normal.  There is a nonobstructing 11 mm ovoid calculus in the left renal pelvis. No radiopaque right renal calculi. GI/Bowel: No evidence of a bowel obstruction, free air or pneumatosis. There is severe dilation of the rectum which measures up to 8.4 x 7.7 cm. A large amount of barium contrast material is present within retained rectal stool. There is at least mild rectal wall thickening. Streak artifact from the barium contrast limits assessment for pneumatosis of the rectum. Within this limitation, there is no obvious pneumatosis. The appendix is not confidently visualized and may be surgically absent. There is a tract of small amount of fluid and multiple gas locules extending from the subcutaneous soft tissues of the epigastric region into the abdominal wall and to the level of the body of the stomach, consistent with a previous percutaneous gastrostomy tract. No evidence of a drainable fluid collection. Pelvis: The urinary bladder is completely decompressed around a Carrera catheter, limiting assessment. There appears to be a bladder calculus along the posterolateral right-sided margin of the bladder, measuring up to 7 mm. No pelvic lymphadenopathy or free fluid in the pelvis. The uterus is normal in appearance. The ovaries are not visualized andmay be surgically absent or atrophic. Peritoneum/Retroperitoneum: The abdominal aorta contains mild to moderate atherosclerotic plaques but is nonaneurysmal.  No retroperitoneal lymphadenopathy or mass. Bones/Soft Tissues: There are multiple acute appearing fractures in the pelvis, including the left superior and inferior pubic ramus.   Difficult to exclude sacral fractures given the severe osteopenia. No additional fractures are appreciated in the pelvis. Completely dislodged percutaneous gastrostomy catheter with multiple small gas locules and a small amount of fluid extending along the subcutaneous tract of the prior gastrostomy tube. No drainable fluid collection. Severely dilated rectum which measures up to 8.4 cm and demonstrates circumferential wall thickening. These findings are consistent with fecal impaction and superimposed stercoral colitis. Streak artifact from barium contrast within rectal stool limits assessment for pneumatosis. Within this limitation, there is no obvious rectal pneumatosis. Acute appearing fractures involving the left superior and inferior pubic rami. Severe osteopenia limits assessment for nondisplaced sacral fractures, and given the findings in the left pelvis, a nondisplaced sacral fracture cannot be excluded. Nonobstructing 11 mm calculus in the left renal pelvis.      LABS  Recent Labs     03/09/22  0632 03/09/22  1454   WBC 12.2*  --    HGB 9.5* 8.5*   HCT 30.6* 27.4*   .0*  --      --      Recent Labs     03/09/22  0632   *   K 3.7   *   CO2 25   BUN 14   CREATININE 0.4*   GFRAA >60   LABGLOM >60   GLUCOSE 81   PROT 5.7*   LABALBU 2.1*   CALCIUM 8.7   BILITOT 0.4   ALKPHOS 165*   AST 93*   *       Lab Results   Component Value Date    CRP 31.9 (H) 03/08/2022     Lab Results   Component Value Date    SEDRATE 109 (H) 03/08/2022       Lab Results   Component Value Date    COVID19 Not Detected 04/09/2021       Recent Labs     03/08/22  2240 03/09/22  0632 03/09/22  1358 03/09/22  1454   CRP 31.9*  --   --   --    PROCAL  --   --  0.24*  --    INR  --   --   --  1.4   PROTIME  --   --   --  15.8*   AST  --  93*  --   --    ALT  --  101*  --   --         MICROBIOLOGY:  [address]       urine 2/16/2022         FINAL IMPRESSION    Patient is a 67 y.o. female who presented with peg site infection  and admitted for Sepsis, MRSA septicemia  leukocytosis  GIB fecal impaction  transaminitis  Acute appearing fractures involving the left superior and inferior pubic rami  a nondisplaced sacral fracture cannot be excluded. Nonobstructing 11 mm calculus in the left renal pelvis  Small bilateral pleural effusions  -covid screen neg    vancomycin (VANCOCIN) 1,000 mg in dextrose 5 % 250 mL IVPB, Q12H  miconazole (MICOTIN) 2 % powder, BID  piperacillin-tazobactam (ZOSYN) 3,375 mg in dextrose 5 % 50 mL IVPB extended infusion (mini-bag), Q8H  fluconazole (DIFLUCAN) in 0.9 % sodium chloride IVPB 200 mg, Q24H     Esr/crp  follow up Blood cx         Imaging and labs were reviewed per medical records and any ID pertinent labs were addressed with the patient. The patient/FAMILY  was educated about the diagnosis, prognosis, indications, risks and benefits of treatment. An opportunity to ask questions was given to the patient/FAMILY and questions were answered. Thank you for involving me in the care of Jay Dilpriyanka. Please do not hesitate to call for any questions or concerns.          Electronically signed by Maxine Norris MD on 3/9/2022 at 5:16 PM

## 2022-03-09 NOTE — CONSULTS
[facility] Cardiology consult  Dr. Sandee James      Reason for Consult: MRSA bacteremia, needs KADEN  Requesting Physician: Dr. Emerald Chow: Bacteremia  History Obtained From: non-family caregiver - nurse, electronic medical record  HISTORY OF PRESENT ILLNESS:     Patient 67years old female with history of hypertension, hyperlipidemia, failure to thrive, PEG tube insertion and dislodgment, was found to have MRSA bacteremia, cardiology was consulted for KADEN to rule out endocarditis.   Patient is awake, nonverbal    Past Medical History:   Diagnosis Date    Allergic     Anxiety disorder     Arthritis     Difficulty walking     Dysphagia     Hyperlipidemia     Hypertension     Interstitial cystitis     Lack of coordination     Mitral valve prolapse     pt. has a mitral valve prolapse    Neuromuscular disorder (HCC)     Other disorders of kidney and ureter in diseases classified elsewhere     Pelvis fracture (HCC)     multiple fxs pelvis    Psychiatric problem     Thyroid disease     Trigeminal neuralgia     Unspecified dementia without behavioral disturbance (HCC)        Past Surgical History:   Procedure Laterality Date    FRACTURE SURGERY      GASTROSTOMY TUBE PLACEMENT N/A 3/3/2022    EGD PEG TUBE PLACEMENT performed by Pooja Membreno MD at [address] [place] [address] Left          Current Facility-Administered Medications:     pantoprazole (PROTONIX) injection 40 mg, 40 mg, IntraVENous, BID, Krysta Route, DO    0.9% NaCl with KCl 40 mEq infusion, , IntraVENous, Continuous, Clayborne Staple, APRN - CNP, Last Rate: 75 mL/hr at 03/09/22 1341, New Bag at 03/09/22 1341    vancomycin (VANCOCIN) 1,000 mg in dextrose 5 % 250 mL IVPB, 1,000 mg, IntraVENous, Q12H, Gerson Broad Black, DO    0.9 % sodium chloride bolus, 500 mL, IntraVENous, Once, Clayborne Staple, APRN - CNP    albumin human 25 % IV solution 50 g, 50 g, IntraVENous, Q4H, Clayborne Staple, APRN - CNP    miconazole (Alverna Comment) 2 % powder, , Topical, BID, Fabian Amato DO    white petrolatum ointment, , Topical, BID **AND** white petrolatum ointment, , Topical, TID PRN, Fabian Amato DO    piperacillin-tazobactam (ZOSYN) 3,375 mg in dextrose 5 % 50 mL IVPB extended infusion (mini-bag), 3,375 mg, IntraVENous, Q8H, Fabian Amato DO, Last Rate: 12.5 mL/hr at 03/09/22 1347, 3,375 mg at 03/09/22 1347    morphine (PF) injection 2 mg, 2 mg, IntraVENous, Q4H PRN, Fabian Amato DO    ondansetron [facility]) injection 4 mg, 4 mg, IntraVENous, Q6H PRN, Fabian Amato DO    fluconazole (DIFLUCAN) in 0.9 % sodium chloride IVPB 200 mg, 200 mg, IntraVENous, Q24H, Fabian Amato DO, Last Rate: 100 mL/hr at 03/09/22 1011, 200 mg at 03/09/22 1011    albuterol (PROVENTIL) nebulizer solution 2.5 mg, 2.5 mg, Nebulization, Q6H PRN, Fabian Amato DO    [Held by provider] heparin (porcine) injection 5,000 Units, 5,000 Units, SubCUTAneous, 3 times per day, Fabian Amato DO, 5,000 Units at 03/09/22 1345    Allergies as of 03/08/2022 - Fully Reviewed 03/08/2022   Allergen Reaction Noted    Abilify [aripiprazole]  05/16/2014    Depakote [divalproex sodium]  05/16/2014    Dye [iodides] Hives 05/07/2014    Geodon [ziprasidone hcl]  05/16/2014    Levsin [hyoscyamine]  03/02/2022    Lithium  05/16/2014    Neurontin [gabapentin]  05/16/2014    Risperidone and related  05/16/2014    Tegretol [carbamazepine]  05/16/2014    Tramadol  05/16/2014    Trileptal [oxcarbazepine]  05/16/2014    Zyprexa [olanzapine]  05/16/2014       Social History     Socioeconomic History    Marital status:      Spouse name: Not on file    Number of children: 2    Years of education: 12    Highest education level: Not on file   Occupational History    Not on file   Tobacco Use    Smoking status: Never Smoker    Smokeless tobacco: Not on file   Vaping Use    Vaping Use: Never used   Substance and Sexual Activity    Alcohol use: No    Drug use: No    Sexual activity: Never Other Topics Concern    Not on file   Social History Narrative    Not on file     Social Determinants of Health     Financial Resource Strain: Low Risk     Difficulty of Paying Living Expenses: Not hard at all   Food Insecurity: No Food Insecurity    Worried About Running Out of Food in the Last Year: Never true    [address] in the Last Year: Never true   Transportation Needs: No Transportation Needs    Lack of Transportation (Medical): No    Lack of Transportation (Non-Medical): No   Physical Activity: Inactive    Days of Exercise per Week: 0 days    Minutes of Exercise per Session: 0 min   Stress: No Stress Concern Present    Feeling of Stress : Not at all   Social Connections: Unknown    Frequency of Communication with Friends and Family: Three times a week    Frequency of Social Gatherings with Friends and Family: Three times a week    Attends Buddhist Services: Never    Active Member of Clubs or Organizations: No    Attends Club or Organization Meetings: Never    Marital Status: Patient refused   Intimate Partner Violence: Not At Risk    Fear of Current or Ex-Partner: No    Emotionally Abused: No    Physically Abused: No    Sexually Abused: No   Housing Stability: Low Risk     Unable to Pay for Housing in the Last Year: No    Number of [place] in the Last Year: 1    Unstable Housing in the Last Year: No       Family History   Problem Relation Age of Onset    Cancer Mother     High Blood Pressure Mother     Cancer Sister     High Blood Pressure Brother     High Cholesterol Brother     Stroke Maternal Grandmother     Learning Disabilities Son        REVIEW OF SYSTEMS:   Unable to obtain since patient is nonverbal      PHYSICAL EXAM:   CONSTITUTIONAL: no apparent distress, and appears stated age  EYES:  lids and lashes normal, anicteric sclerae  HEAD:  normocepalic, without obvious abnormality, atraumatic, pink, moist mucous membranes.   NECK:  Supple, symmetrical, trachea midline, no adenopathy, thyroid symmetric, not enlarged and no tenderness, skin normal  HEMATOLOGIC/LYMPHATICS:  no cervical lymphadenopathy and no supraclavicular lymphadenopathy  LUNGS:  No increased work of breathing,  No accessory muscle use or intercostal retractions, decreased air exchange due to poor inspiratory effort, bilateral rhonchi  CARDIOVASCULAR:  Normal apical impulse, tachycardic, normal S1 and S2, no S3 or S4, 2 out of 6 systolic murmur at the apex, 2 out of 6 systolic murmur at the left lower sternal border, no JVD, no carotid bruit, no pedal edema, good carotid upstroke bilaterally. ABDOMEN:  Soft, nontender, no masses, no hepatomegaly or splenomegaly, BS+  CHEST: nontender to palpation, expands symmetrically  MUSCULOSKELETAL:  No clubbing no cyanosis.   NEUROLOGIC: Awake, nonverbal  SKIN:  no bruising or bleeding, normal skin color, texture, turgor and no redness, warmth, or swelling      BP (!) 82/50   Pulse 111   Temp 99.1 °F (37.3 °C) (Axillary)   Resp 18   Ht 4' 11\" (1.499 m)   Wt 115 lb 14.4 oz (52.6 kg)   SpO2 97%   BMI 23.41 kg/m²     DATA:   I personally reviewed the admission EKG with the following interpretation: Not available for review    Monitor strips reviewed: Sinus rhythm alternating with sinus tachycardia with very short burst of atrial fibrillation    ECHO: Not performed to date  Stress Test: Not performed to date  Angiography: Not performed to date  Cardiology Labs:   BMP:    Lab Results   Component Value Date     03/09/2022    K 3.7 03/09/2022     03/09/2022    CO2 25 03/09/2022    BUN 14 03/09/2022     CMP:    Lab Results   Component Value Date     03/09/2022    K 3.7 03/09/2022     03/09/2022    CO2 25 03/09/2022    BUN 14 03/09/2022    PROT 5.7 03/09/2022     CBC:    Lab Results   Component Value Date    WBC 12.2 03/09/2022    RBC 2.97 03/09/2022    HGB 8.5 03/09/2022    HCT 27.4 03/09/2022    .0 03/09/2022    RDW 14.3 03/09/2022     03/09/2022     PT/INR:  No results found for: PTINR  PT/INR Warfarin:  No components found for: PTPATWAR, PTINRWAR  PTT:  No results found for: APTT  PTT Heparin:  No components found for: APTTHEP  Magnesium:    Lab Results   Component Value Date    MG 1.9 03/09/2022     TSH:  No results found for: TSH  TROPONIN:  No components found for: TROP  BNP:  No results found for: BNP  FASTING LIPID PANEL:  No results found for: CHOL, HDL, TRIG  XR CHEST PORTABLE   Final Result   Small bilateral pleural effusions         CT ABDOMEN PELVIS WO CONTRAST Additional Contrast? None   Final Result   Completely dislodged percutaneous gastrostomy catheter with multiple small   gas locules and a small amount of fluid extending along the subcutaneous   tract of the prior gastrostomy tube. No drainable fluid collection. Severely dilated rectum which measures up to 8.4 cm and demonstrates   circumferential wall thickening. These findings are consistent with fecal   impaction and superimposed stercoral colitis. Streak artifact from barium   contrast within rectal stool limits assessment for pneumatosis. Within this   limitation, there is no obvious rectal pneumatosis. Acute appearing fractures involving the left superior and inferior pubic   rami. Severe osteopenia limits assessment for nondisplaced sacral fractures,   and given the findings in the left pelvis, a nondisplaced sacral fracture   cannot be excluded. Nonobstructing 11 mm calculus in the left renal pelvis. I have personally reviewed the laboratory, cardiac diagnostic and radiographic testing as outlined above:      IMPRESSION:  1. MRSA bacteremia: KADEN requested to rule out endocarditis, will schedule for tomorrow  2. Short burst of atrial fibrillation: will follow closely  3. Hypotension:  4. Hypernatremia    RECOMMENDATIONS:   1.  KADEN tomorrow  2.   Basic metabolic panel and CBC in a.m.  3.  Further recommendations will be forthcoming pending her clinical course and diagnostic test findings    No family at bedside    Thank you for the consult  Electronically signed by Edi Ugalde MD on 3/9/2022 at 4:12 PM  NOTE: This report was transcribed using voice recognition software.  Every effort was made to ensure accuracy; however, inadvertent computerized transcription errors may be present

## 2022-03-09 NOTE — H&P
Department of Internal Medicine  History and Physical    PCP: Dr. Dary Sung   Admitting Physician: Dr. Juvencio Sánchez  Consultants: Surgery, Infectious disease, Cardiology      CHIEF COMPLAINT:  PEG tube dislodgement    HISTORY OF PRESENT ILLNESS:    Mayi Chacon is a 76-year-old female who presented to [facility] emergency department directly from [facility] emergency department yesterday. She had undergone PEG tube placement on 3/3/2022 in the setting of failure to thrive status. She was discharged to the nursing home thereafter and unfortunately dislodged her PEG tube recently. Work-up in the emergency department there revealed infected PEG site and the development of intra-abdominal abscess. Preliminary blood cultures are indicating MRSA x2 bottles. She is confused on examination today without family members present. She is overall ill-appearing and has been placed on broad-spectrum antibiotic therapy. Multiple subspecialists have been asked to provide consultation.     PAST MEDICAL Hx:  Past Medical History:   Diagnosis Date    Allergic     Anxiety disorder     Arthritis     Difficulty walking     Dysphagia     Hyperlipidemia     Hypertension     Interstitial cystitis     Lack of coordination     Mitral valve prolapse     pt. has a mitral valve prolapse    Neuromuscular disorder (HCC)     Other disorders of kidney and ureter in diseases classified elsewhere     Pelvis fracture (HCC)     multiple fxs pelvis    Psychiatric problem     Thyroid disease     Trigeminal neuralgia     Unspecified dementia without behavioral disturbance ([address]        PAST SURGICAL Hx:   Past Surgical History:   Procedure Laterality Date    FRACTURE SURGERY      GASTROSTOMY TUBE PLACEMENT N/A 3/3/2022    EGD PEG TUBE PLACEMENT performed by Luciano Delgado MD at [address] Left        FAMILY Hx:  Family History   Problem Relation Age of Onset    Cancer Mother     High Blood Pressure tobacco: Not on file   Vaping Use    Vaping Use: Never used   Substance and Sexual Activity    Alcohol use: No    Drug use: No    Sexual activity: Never   Other Topics Concern    Not on file   Social History Narrative    Not on file     Social Determinants of Health     Financial Resource Strain: Low Risk     Difficulty of Paying Living Expenses: Not hard at all   Food Insecurity: No Food Insecurity    Worried About Running Out of Food in the Last Year: Never true    [place] of Food in the Last Year: Never true   Transportation Needs: No Transportation Needs    Lack of Transportation (Medical): No    Lack of Transportation (Non-Medical): No   Physical Activity: Inactive    Days of Exercise per Week: 0 days    Minutes of Exercise per Session: 0 min   Stress: No Stress Concern Present    Feeling of Stress : Not at all   Social Connections: Unknown    Frequency of Communication with Friends and Family: Three times a week    Frequency of Social Gatherings with Friends and Family: Three times a week    Attends Catholic Services: Never    Active Member of Clubs or Organizations: No    Attends Club or Organization Meetings: Never    Marital Status: Patient refused   Intimate Partner Violence: Not At Risk    Fear of Current or Ex-Partner: No    Emotionally Abused: No    Physically Abused: No    Sexually Abused: No   Housing Stability: Low Risk     Unable to Pay for Housing in the Last Year: No    Number of [place] in the Last Year: 1    Unstable Housing in the Last Year: No       ROS:  Rather limited in the patient's current condition. She is not oriented to place or time. She appears uncomfortable overall. PHYSICAL EXAM:  VITALS:  Vitals:    03/09/22 1215   BP: 131/81   Pulse: 93   Resp: 20   Temp: 98 °F (36.7 °C)   SpO2: 97%         CONSTITUTIONAL:    Awake. She is not oriented. She is ill-appearing.     EYES:    PERRL, EOMI, sclera clear, conjunctiva normal    ENT: Normocephalic, atraumatic, sinuses nontender on palpation. External ears without lesions. Oral pharynx with moist mucus membranes. Tonsils without erythema or exudates. Nasal cannula oxygen is in place. NECK:    Supple, symmetrical, trachea midline, no adenopathy, thyroid symmetric, not enlarged and no tenderness, skin normal, no bruits, no JVD    HEMATOLOGIC/LYMPHATICS:    No cervical lymphadenopathy and no supraclavicular lymphadenopathy    LUNGS:    Diminished bilaterally with rales at the bases posteriorly. CARDIOVASCULAR:    Normal apical impulse, regular rate and rhythm, normal S1 and S2, no S3 or S4, and systolic murmur. ABDOMEN:    Dressings are in place to the recently extracted PEG tube site. Infected appearing drainage is identified. Abdomen is soft with pain to palpation diffusely. MUSCULOSKELETAL:    There is no redness, warmth, or swelling of the joints. Full range of motion noted. Motor strength is 5 out of 5 all extremities bilaterally. Tone is normal.    NEUROLOGIC:    Chronic neurologic deficiencies. She is likely at or near her baseline from a mentation standpoint. SKIN:    Thin skin with diffuse bruising. EXTREMITIES:    Peripheral pulses present. No edema, cyanosis, or swelling. OSTEOPATHIC:    Examined in seated and supine positions. Normal thoracic kyphosis and lumbar lordosis. No acute somatic dysfunction.     LABORATORY DATA:  CBC with Differential:    Lab Results   Component Value Date    WBC 12.2 03/09/2022    RBC 2.97 03/09/2022    HGB 9.5 03/09/2022    HCT 30.6 03/09/2022     03/09/2022    .0 03/09/2022    MCH 32.0 03/09/2022    MCHC 31.0 03/09/2022    RDW 14.3 03/09/2022    NRBC 0.9 03/09/2022    LYMPHOPCT 12.2 03/09/2022    MONOPCT 7.0 03/09/2022    MYELOPCT 0.9 03/09/2022    BASOPCT 1.7 03/09/2022    MONOSABS 0.85 03/09/2022    LYMPHSABS 1.46 03/09/2022    EOSABS 0.43 03/09/2022    BASOSABS 0.21 03/09/2022     BMP:    Lab Results Component Value Date     03/09/2022    K 3.7 03/09/2022     03/09/2022    CO2 25 03/09/2022    BUN 14 03/09/2022    LABALBU 2.1 03/09/2022    CREATININE 0.4 03/09/2022    CALCIUM 8.7 03/09/2022    GFRAA >60 03/09/2022    LABGLOM >60 03/09/2022    GLUCOSE 81 03/09/2022       ASSESSMENT:  1. Sepsis in the setting of dislodged PEG tube with developing abscess and intra-abdominal infection  2. MRSA bacteremia x2 bottles  3. Dilated rectum with fecal impaction  4. Pubic rami fractures  5. Hypernatremia with overall free water deficit  6. NoFailure to thrive with severe protein calorie malnutrition  7. Neuromuscular disorder of undetermined etiology with known psychiatric dysfunction as per the chart  8. Hypothyroidism  9. Essential hypertension  10. Hyperlipidemia    PLAN:  Yetta Schilder is rather ill on examination today. Her peg tube had become dislodged and there appears to be early abscess formation. Blood cultures were also positive x2 with MRSA. Broad-spectrum antibiotic therapy is being employed and the infectious disease team will provide consultation. Plans are for surgical intervention with replacement of PEG tube and intra-abdominal washout tomorrow. We have also asked the cardiovascular team to provide consultation as endocarditis must be considered with MRSA bacteremia. We will provide IV fluid resuscitation accounting for the electrolyte abnormalities. We will better come to understand the patient's baseline from a mental status standpoint. She typically resides in a skilled nursing facility but in the event IV antibiotic therapy and abdominal issues will be addressed, LTAC should be considered. Greater than 40 minutes of critical care time was spent with the patient. This time included chart review, , and discussion with those consultants involved in the patient's care.     Robbin Monreal DO, D.O., [facility] Client  12:26 PM  3/9/2022    Electronically signed by Robbin Monreal DO on 3/9/22 at 12:26 PM EST

## 2022-03-09 NOTE — CARE COORDINATION
SOCIAL WORK / DISCHARGE PLANNING:  WILL NEED RAPID COVID TEST PRIOR TO DC. Pt admitted from [facility] skilled. Per Aida sue pt is a bedhold, will accept back, [address] can be attempted but do not need to wait for it to be obtained. PEG pulled this am, to be replaced next date. Update via phone to Elba Martinez . Finn Ordonez RN spoke with pt's sister, plan for return to [facility] as previous. MARTY will need signed by physician.                  Electronically signed by NIRANJAN Snyder on 3/9/2022 at 11:54 AM

## 2022-03-09 NOTE — PROGRESS NOTES
Physical Therapy  Room #: 3430/2238-12  Date: 3/9/2022       Patient Name: Suleiman Pennington  : [id number]      MRN: [id number]     Patient unavailable for physical therapy eval due to unavailable/not appropriate per nursing due to decline in status and possible surgical intervention later. Will attempt PT evaluation at a later time. Thank you.        Ramon Thompson, Student Physical Therapist

## 2022-03-09 NOTE — FLOWSHEET NOTE
Inpatient Wound Care (Initial consult) [id number]    Admit Date: 3/8/2022  6:25 PM    Reason for consult:  Peg tube site    Significant history:  Per H&P     CHIEF COMPLAINT:  PEG tube dislodgement     HISTORY OF PRESENT ILLNESS:    Devora Ramos is a 67-year-old female who presented to [address] [place] [address] emergency department directly from [facility] emergency department yesterday. She had undergone PEG tube placement on 3/3/2022 in the setting of failure to thrive status. She was discharged to the nursing home thereafter and unfortunately dislodged her PEG tube recently. Work-up in the emergency department there revealed infected PEG site and the development of intra-abdominal abscess. Preliminary blood cultures are indicating MRSA x2 bottles. She is confused on examination today without family members present. She is overall ill-appearing and has been placed on broad-spectrum antibiotic therapy. Multiple subspecialists have been asked to provide consultation. Findings:        03/09/22 1551   Gastrostomy/Enterostomy/Jejunostomy PEG-Jejunostomy LUQ   Placement Date/Time: 03/03/22 1204   Timeout: Patient;Sterile Technique using full body drape; Availability of Implant; Correct Position;Procedure;Site/Side;Appropriate Equipment; Consent Confirmed;Sterile drsg with biopatch;Site prepped with chlorhexidi. .. Drainage Appearance Purulent   Site Description   (0.5 x 1.5)   Surrounding Skin Reddened  (excoriation)   Dressing Type Other (Comment)  (pouch)        03/09/22 1551   Skin Integrity   Skin Integrity   (redness, blanchable)   Location Bilateral heels   Skin Integrity Site 2   Skin Integrity Location 2 Excoriation   Location 2 abdominal folds, breast folds      **Informed Consent**     photos taken of old peg tube site and inserted into their chart as part of their permanent medical record for purposes of documentation, treatment management and/or medical review.    All Images taken on 3/9/22 of patient name: Devora Ramos Adamaris Bender were transmitted and stored on secured [employer] located within [facility] by a registered [employer] Mobile Application Device. Impression: Old peg tube site    Plan: Ostomy pouch applied  Antifungal powder  TAPS  Heel protectors  Patient will need continued preventative care.       Samira Harris RN 3/9/2022 3:54 PM

## 2022-03-09 NOTE — PROGRESS NOTES
Pharmacy Consultation Note  (Antibiotic Dosing and Monitoring)    Initial consult date: 3/9/22  Consulting physician/provider: Dr. Tomasz [place]  Drug: Vancomycin  Indication: MRSA bacteremia    Age/  Gender Height Weight IBW  Allergy Information   76 y.o./female 4' 11\" (149.9 cm) 115 lb (52.2 kg)     Patient must be at least 60 in tall to calculate ideal body weight   Abilify [aripiprazole], Depakote [divalproex sodium], Dye [iodides], Geodon [ziprasidone hcl], Levsin [hyoscyamine], Lithium, Neurontin [gabapentin], Risperidone and related, Tegretol [carbamazepine], Tramadol, Trileptal [oxcarbazepine], and Zyprexa [olanzapine]      Renal Function:  Recent Labs     03/09/22  0632   BUN 14   CREATININE 0.4*       Intake/Output Summary (Last 24 hours) at 3/9/2022 1337  Last data filed at 3/9/2022 1240  Gross per 24 hour   Intake 767.39 ml   Output 800 ml   Net -32.61 ml       Vancomycin Monitoring:  Trough:  No results for input(s): VANCOTROUGH in the last 72 hours. Random:  No results for input(s): VANCORANDOM in the last 72 hours. Vancomycin Administration Times:  Recent vancomycin administrations      No vancomycin IV orders with administrations found. Assessment:  · Patient is a 67 y.o. female who has been initiated on vancomycin for MRSA bacteremia per chart review. The positive cultures are from an outside facility that were just reported, so has not been treated. Repeat blood cultures have been collected here and are pending.   · CrCl ~40-50 mL/min  · To dose vancomycin, pharmacy will be utilizing nuPSYS calculation software for goal AUC/CLIFF 400-600 mg/L-hr    Plan:  · Will start vancomycin 1,000 IV every 12 hours  · Will check vancomycin levels when appropriate  · Will continue to monitor renal function   · Clinical pharmacy will continue to follow      Acosta Rivera PharmD, BCPS 3/9/2022 1:48 PM   [phone]

## 2022-03-10 ENCOUNTER — ANESTHESIA (OUTPATIENT)
Dept: OPERATING ROOM | Age: 77
DRG: 862 | End: 2022-03-10
Payer: COMMERCIAL

## 2022-03-10 ENCOUNTER — ANESTHESIA EVENT (OUTPATIENT)
Dept: OPERATING ROOM | Age: 77
DRG: 862 | End: 2022-03-10
Payer: COMMERCIAL

## 2022-03-10 VITALS
SYSTOLIC BLOOD PRESSURE: 112 MMHG | DIASTOLIC BLOOD PRESSURE: 76 MMHG | RESPIRATION RATE: 22 BRPM | OXYGEN SATURATION: 89 %

## 2022-03-10 PROBLEM — E44.0 MODERATE PROTEIN-CALORIE MALNUTRITION (HCC): Status: ACTIVE | Noted: 2022-03-10

## 2022-03-10 LAB
ALBUMIN SERPL-MCNC: 4.1 G/DL (ref 3.5–5.2)
ALP BLD-CCNC: 127 U/L (ref 35–104)
ALT SERPL-CCNC: 43 U/L (ref 0–32)
ANION GAP SERPL CALCULATED.3IONS-SCNC: 13 MMOL/L (ref 7–16)
ANISOCYTOSIS: ABNORMAL
AST SERPL-CCNC: 53 U/L (ref 0–31)
BASOPHILS ABSOLUTE: 0 E9/L (ref 0–0.2)
BASOPHILS RELATIVE PERCENT: 0 % (ref 0–2)
BILIRUB SERPL-MCNC: 0.7 MG/DL (ref 0–1.2)
BUN BLDV-MCNC: 11 MG/DL (ref 6–23)
CALCIUM SERPL-MCNC: 9.6 MG/DL (ref 8.6–10.2)
CHLORIDE BLD-SCNC: 117 MMOL/L (ref 98–107)
CO2: 19 MMOL/L (ref 22–29)
CREAT SERPL-MCNC: 0.5 MG/DL (ref 0.5–1)
EOSINOPHILS ABSOLUTE: 0.99 E9/L (ref 0.05–0.5)
EOSINOPHILS RELATIVE PERCENT: 13 % (ref 0–6)
FERRITIN: 720 NG/ML
FOLATE: 10.2 NG/ML (ref 4.8–24.2)
GFR AFRICAN AMERICAN: >60
GFR NON-AFRICAN AMERICAN: >60 ML/MIN/1.73
GLUCOSE BLD-MCNC: 74 MG/DL (ref 74–99)
HCT VFR BLD CALC: 21.9 % (ref 34–48)
HCT VFR BLD CALC: 22.7 % (ref 34–48)
HCT VFR BLD CALC: 24.4 % (ref 34–48)
HCT VFR BLD CALC: 24.6 % (ref 34–48)
HCT VFR BLD CALC: 27.6 % (ref 34–48)
HEMOGLOBIN: 7.1 G/DL (ref 11.5–15.5)
HEMOGLOBIN: 7.6 G/DL (ref 11.5–15.5)
HEMOGLOBIN: 7.8 G/DL (ref 11.5–15.5)
HEMOGLOBIN: 8.6 G/DL (ref 11.5–15.5)
IMMATURE RETIC FRACT: 17.1 % (ref 3–15.9)
IRON SATURATION: 60 % (ref 15–50)
IRON: 53 MCG/DL (ref 37–145)
LYMPHOCYTES ABSOLUTE: 1.9 E9/L (ref 1.5–4)
LYMPHOCYTES RELATIVE PERCENT: 25 % (ref 20–42)
MAGNESIUM: 1.9 MG/DL (ref 1.6–2.6)
MCH RBC QN AUTO: 31.6 PG (ref 26–35)
MCHC RBC AUTO-ENTMCNC: 32.4 % (ref 32–34.5)
MCV RBC AUTO: 97.3 FL (ref 80–99.9)
MONOCYTES ABSOLUTE: 0.53 E9/L (ref 0.1–0.95)
MONOCYTES RELATIVE PERCENT: 7 % (ref 2–12)
MYELOCYTE PERCENT: 1 % (ref 0–0)
NEUTROPHILS ABSOLUTE: 4.18 E9/L (ref 1.8–7.3)
NEUTROPHILS RELATIVE PERCENT: 54 % (ref 43–80)
PDW BLD-RTO: 15.9 FL (ref 11.5–15)
PHOSPHORUS: 2.2 MG/DL (ref 2.5–4.5)
PLATELET # BLD: 183 E9/L (ref 130–450)
PMV BLD AUTO: 10.6 FL (ref 7–12)
POLYCHROMASIA: ABNORMAL
POTASSIUM SERPL-SCNC: 4 MMOL/L (ref 3.5–5)
RBC # BLD: 2.25 E12/L (ref 3.5–5.5)
RETIC HGB EQUIVALENT: 26.5 PG (ref 28.2–36.6)
RETICULOCYTE ABSOLUTE COUNT: 0.02 E12/L
RETICULOCYTE COUNT PCT: 0.9 % (ref 0.4–1.9)
SODIUM BLD-SCNC: 149 MMOL/L (ref 132–146)
T4 FREE: 0.84 NG/DL (ref 0.93–1.7)
TOTAL IRON BINDING CAPACITY: 88 MCG/DL (ref 250–450)
TOTAL PROTEIN: 6 G/DL (ref 6.4–8.3)
TSH SERPL DL<=0.05 MIU/L-ACNC: 4.88 UIU/ML (ref 0.27–4.2)
VANCOMYCIN TROUGH: 13.2 MCG/ML (ref 5–16)
VITAMIN B-12: 1386 PG/ML (ref 211–946)
WBC # BLD: 7.6 E9/L (ref 4.5–11.5)

## 2022-03-10 PROCEDURE — 97165 OT EVAL LOW COMPLEX 30 MIN: CPT | Performed by: OCCUPATIONAL THERAPIST

## 2022-03-10 PROCEDURE — 3600000012 HC SURGERY LEVEL 2 ADDTL 15MIN: Performed by: SURGERY

## 2022-03-10 PROCEDURE — 2580000003 HC RX 258: Performed by: INTERNAL MEDICINE

## 2022-03-10 PROCEDURE — 2580000003 HC RX 258: Performed by: SURGERY

## 2022-03-10 PROCEDURE — 6360000002 HC RX W HCPCS: Performed by: INTERNAL MEDICINE

## 2022-03-10 PROCEDURE — 83550 IRON BINDING TEST: CPT

## 2022-03-10 PROCEDURE — 6360000002 HC RX W HCPCS: Performed by: NURSE ANESTHETIST, CERTIFIED REGISTERED

## 2022-03-10 PROCEDURE — 84443 ASSAY THYROID STIM HORMONE: CPT

## 2022-03-10 PROCEDURE — 97530 THERAPEUTIC ACTIVITIES: CPT | Performed by: OCCUPATIONAL THERAPIST

## 2022-03-10 PROCEDURE — 80053 COMPREHEN METABOLIC PANEL: CPT

## 2022-03-10 PROCEDURE — 3700000001 HC ADD 15 MINUTES (ANESTHESIA): Performed by: SURGERY

## 2022-03-10 PROCEDURE — 6360000002 HC RX W HCPCS: Performed by: SURGERY

## 2022-03-10 PROCEDURE — 7100000001 HC PACU RECOVERY - ADDTL 15 MIN: Performed by: SURGERY

## 2022-03-10 PROCEDURE — 2709999900 HC NON-CHARGEABLE SUPPLY: Performed by: SURGERY

## 2022-03-10 PROCEDURE — P9047 ALBUMIN (HUMAN), 25%, 50ML: HCPCS | Performed by: NURSE PRACTITIONER

## 2022-03-10 PROCEDURE — 2060000000 HC ICU INTERMEDIATE R&B

## 2022-03-10 PROCEDURE — C9113 INJ PANTOPRAZOLE SODIUM, VIA: HCPCS | Performed by: SURGERY

## 2022-03-10 PROCEDURE — 2500000003 HC RX 250 WO HCPCS: Performed by: SURGERY

## 2022-03-10 PROCEDURE — 6360000002 HC RX W HCPCS: Performed by: NURSE PRACTITIONER

## 2022-03-10 PROCEDURE — 85045 AUTOMATED RETICULOCYTE COUNT: CPT

## 2022-03-10 PROCEDURE — 85018 HEMOGLOBIN: CPT

## 2022-03-10 PROCEDURE — 85014 HEMATOCRIT: CPT

## 2022-03-10 PROCEDURE — 0DJD8ZZ INSPECTION OF LOWER INTESTINAL TRACT, VIA NATURAL OR ARTIFICIAL OPENING ENDOSCOPIC: ICD-10-PCS | Performed by: SURGERY

## 2022-03-10 PROCEDURE — 3700000000 HC ANESTHESIA ATTENDED CARE: Performed by: SURGERY

## 2022-03-10 PROCEDURE — 7100000000 HC PACU RECOVERY - FIRST 15 MIN: Performed by: SURGERY

## 2022-03-10 PROCEDURE — 85025 COMPLETE CBC W/AUTO DIFF WBC: CPT

## 2022-03-10 PROCEDURE — 97110 THERAPEUTIC EXERCISES: CPT

## 2022-03-10 PROCEDURE — 0DH63UZ INSERTION OF FEEDING DEVICE INTO STOMACH, PERCUTANEOUS APPROACH: ICD-10-PCS | Performed by: SURGERY

## 2022-03-10 PROCEDURE — 82607 VITAMIN B-12: CPT

## 2022-03-10 PROCEDURE — 99232 SBSQ HOSP IP/OBS MODERATE 35: CPT | Performed by: INTERNAL MEDICINE

## 2022-03-10 PROCEDURE — 36415 COLL VENOUS BLD VENIPUNCTURE: CPT

## 2022-03-10 PROCEDURE — 3600000002 HC SURGERY LEVEL 2 BASE: Performed by: SURGERY

## 2022-03-10 PROCEDURE — 84100 ASSAY OF PHOSPHORUS: CPT

## 2022-03-10 PROCEDURE — 82746 ASSAY OF FOLIC ACID SERUM: CPT

## 2022-03-10 PROCEDURE — C9113 INJ PANTOPRAZOLE SODIUM, VIA: HCPCS | Performed by: INTERNAL MEDICINE

## 2022-03-10 PROCEDURE — 82728 ASSAY OF FERRITIN: CPT

## 2022-03-10 PROCEDURE — 2700000000 HC OXYGEN THERAPY PER DAY

## 2022-03-10 PROCEDURE — 80202 ASSAY OF VANCOMYCIN: CPT

## 2022-03-10 PROCEDURE — 83735 ASSAY OF MAGNESIUM: CPT

## 2022-03-10 PROCEDURE — 84439 ASSAY OF FREE THYROXINE: CPT

## 2022-03-10 PROCEDURE — 83540 ASSAY OF IRON: CPT

## 2022-03-10 PROCEDURE — 3E0G76Z INTRODUCTION OF NUTRITIONAL SUBSTANCE INTO UPPER GI, VIA NATURAL OR ARTIFICIAL OPENING: ICD-10-PCS | Performed by: SURGERY

## 2022-03-10 PROCEDURE — 2580000003 HC RX 258: Performed by: NURSE ANESTHETIST, CERTIFIED REGISTERED

## 2022-03-10 PROCEDURE — 6370000000 HC RX 637 (ALT 250 FOR IP): Performed by: INTERNAL MEDICINE

## 2022-03-10 PROCEDURE — 43246 EGD PLACE GASTROSTOMY TUBE: CPT | Performed by: SURGERY

## 2022-03-10 PROCEDURE — 6370000000 HC RX 637 (ALT 250 FOR IP): Performed by: SURGERY

## 2022-03-10 PROCEDURE — 97161 PT EVAL LOW COMPLEX 20 MIN: CPT

## 2022-03-10 RX ORDER — SODIUM CHLORIDE 0.9 % (FLUSH) 0.9 %
5-40 SYRINGE (ML) INJECTION PRN
Status: DISCONTINUED | OUTPATIENT
Start: 2022-03-10 | End: 2022-03-10 | Stop reason: HOSPADM

## 2022-03-10 RX ORDER — FENTANYL CITRATE 50 UG/ML
INJECTION, SOLUTION INTRAMUSCULAR; INTRAVENOUS PRN
Status: DISCONTINUED | OUTPATIENT
Start: 2022-03-10 | End: 2022-03-10 | Stop reason: SDUPTHER

## 2022-03-10 RX ORDER — SODIUM CHLORIDE 9 MG/ML
INJECTION, SOLUTION INTRAVENOUS CONTINUOUS PRN
Status: DISCONTINUED | OUTPATIENT
Start: 2022-03-10 | End: 2022-03-10 | Stop reason: SDUPTHER

## 2022-03-10 RX ORDER — MEPERIDINE HYDROCHLORIDE 25 MG/ML
12.5 INJECTION INTRAMUSCULAR; INTRAVENOUS; SUBCUTANEOUS EVERY 5 MIN PRN
Status: DISCONTINUED | OUTPATIENT
Start: 2022-03-10 | End: 2022-03-10 | Stop reason: HOSPADM

## 2022-03-10 RX ORDER — SODIUM CHLORIDE 9 MG/ML
25 INJECTION, SOLUTION INTRAVENOUS PRN
Status: DISCONTINUED | OUTPATIENT
Start: 2022-03-10 | End: 2022-03-10 | Stop reason: HOSPADM

## 2022-03-10 RX ORDER — LIDOCAINE HYDROCHLORIDE 10 MG/ML
INJECTION, SOLUTION INFILTRATION; PERINEURAL PRN
Status: DISCONTINUED | OUTPATIENT
Start: 2022-03-10 | End: 2022-03-10 | Stop reason: ALTCHOICE

## 2022-03-10 RX ORDER — PROPOFOL 10 MG/ML
INJECTION, EMULSION INTRAVENOUS CONTINUOUS PRN
Status: DISCONTINUED | OUTPATIENT
Start: 2022-03-10 | End: 2022-03-10 | Stop reason: SDUPTHER

## 2022-03-10 RX ORDER — SODIUM CHLORIDE 0.9 % (FLUSH) 0.9 %
5-40 SYRINGE (ML) INJECTION EVERY 12 HOURS SCHEDULED
Status: DISCONTINUED | OUTPATIENT
Start: 2022-03-10 | End: 2022-03-10 | Stop reason: HOSPADM

## 2022-03-10 RX ADMIN — POTASSIUM CHLORIDE AND SODIUM CHLORIDE: 900; 300 INJECTION, SOLUTION INTRAVENOUS at 04:26

## 2022-03-10 RX ADMIN — FENTANYL CITRATE 25 MCG: 50 INJECTION, SOLUTION INTRAMUSCULAR; INTRAVENOUS at 11:24

## 2022-03-10 RX ADMIN — PANTOPRAZOLE SODIUM 40 MG: 40 INJECTION, POWDER, FOR SOLUTION INTRAVENOUS at 09:01

## 2022-03-10 RX ADMIN — SODIUM CHLORIDE: 9 INJECTION, SOLUTION INTRAVENOUS at 11:08

## 2022-03-10 RX ADMIN — VANCOMYCIN HYDROCHLORIDE 1000 MG: 1 INJECTION, POWDER, LYOPHILIZED, FOR SOLUTION INTRAVENOUS at 18:40

## 2022-03-10 RX ADMIN — PETROLATUM: 420 OINTMENT TOPICAL at 09:01

## 2022-03-10 RX ADMIN — ALBUMIN (HUMAN) 50 G: 0.25 INJECTION, SOLUTION INTRAVENOUS at 04:22

## 2022-03-10 RX ADMIN — VANCOMYCIN HYDROCHLORIDE 1000 MG: 1 INJECTION, POWDER, LYOPHILIZED, FOR SOLUTION INTRAVENOUS at 02:24

## 2022-03-10 RX ADMIN — PIPERACILLIN SODIUM AND TAZOBACTAM SODIUM 3375 MG: 3; 375 INJECTION, POWDER, FOR SOLUTION INTRAVENOUS at 13:14

## 2022-03-10 RX ADMIN — FENTANYL CITRATE 25 MCG: 50 INJECTION, SOLUTION INTRAMUSCULAR; INTRAVENOUS at 11:31

## 2022-03-10 RX ADMIN — FENTANYL CITRATE 50 MCG: 50 INJECTION, SOLUTION INTRAMUSCULAR; INTRAVENOUS at 11:12

## 2022-03-10 RX ADMIN — FLUCONAZOLE IN SODIUM CHLORIDE 200 MG: 2 INJECTION, SOLUTION INTRAVENOUS at 09:05

## 2022-03-10 RX ADMIN — PIPERACILLIN SODIUM AND TAZOBACTAM SODIUM 3375 MG: 3; 375 INJECTION, POWDER, FOR SOLUTION INTRAVENOUS at 21:23

## 2022-03-10 RX ADMIN — ANTI-FUNGAL POWDER MICONAZOLE NITRATE TALC FREE: 1.42 POWDER TOPICAL at 20:58

## 2022-03-10 RX ADMIN — PIPERACILLIN SODIUM AND TAZOBACTAM SODIUM 3375 MG: 3; 375 INJECTION, POWDER, FOR SOLUTION INTRAVENOUS at 04:32

## 2022-03-10 RX ADMIN — ANTI-FUNGAL POWDER MICONAZOLE NITRATE TALC FREE: 1.42 POWDER TOPICAL at 09:02

## 2022-03-10 RX ADMIN — PETROLATUM: 420 OINTMENT TOPICAL at 20:58

## 2022-03-10 RX ADMIN — MORPHINE SULFATE 2 MG: 2 INJECTION, SOLUTION INTRAMUSCULAR; INTRAVENOUS at 20:57

## 2022-03-10 RX ADMIN — PANTOPRAZOLE SODIUM 40 MG: 40 INJECTION, POWDER, FOR SOLUTION INTRAVENOUS at 20:58

## 2022-03-10 RX ADMIN — PROPOFOL INJECTABLE EMULSION 50 MCG/KG/MIN: 10 INJECTION, EMULSION INTRAVENOUS at 11:12

## 2022-03-10 ASSESSMENT — PULMONARY FUNCTION TESTS
PIF_VALUE: 1
PIF_VALUE: 0

## 2022-03-10 ASSESSMENT — PAIN SCALES - GENERAL
PAINLEVEL_OUTOF10: 0
PAINLEVEL_OUTOF10: 6
PAINLEVEL_OUTOF10: 0

## 2022-03-10 NOTE — CARE COORDINATION
SOCIAL WORK / DISCHARGE PLANNING:  WILL NEED RAPID COVID TEST PRIOR TO DC. Pt admitted from [facility], can accept back. [facility] can be attempted but do not need to wait for it to be obtained. PEG replacement to occur today. Colonscopy planned. Sw consult yesterday for LTAC referral. Sw made referral to both LTACs to follow, possible criteria, would need PRECERT prior to dc if qualifies. MARTY will need signed by physician.                       Electronically signed by NIRANJAN Castañeda on 3/10/2022 at 10:38 AM

## 2022-03-10 NOTE — PROGRESS NOTES
Patients son called, concerned about patient not receiving INVEGA. Per pharmacy; hospital does not carry injectable invega, only ER tablets and unable to give via PEG tube. Patient unable to swallow.  Will notify physician of concern

## 2022-03-10 NOTE — PROGRESS NOTES
Patient placed on nasal cannula, 2l O2 required for SpO2 of 94%. Hgb of 7.1, call out to Dr. Ignacia Leal and general surgery perfect serve message sent. Will initiate O2 protocol as ordered. Electronically signed by Richi Vincent RN on 3/10/2022 at 9:36 AM

## 2022-03-10 NOTE — DISCHARGE INSTR - COC
Continuity of Care Form    Patient Name: Kasandra Koo   :    MRN:  [id number]    Admit date:  3/8/2022  Discharge date:  ***    Code Status Order: Full Code   Advance Directives:      Admitting Physician:  Andry Leblanc DO  PCP: Kary Gilbert MD    Discharging Nurse: Haydee Huizar RN  [address] Unit/Room#: 5941/5696-70  Discharging Unit Phone Number: ***    Emergency Contact:   Extended Emergency Contact Information  Primary Emergency Contact: [address] Phone: [phone]  Relation: Other  Secondary Emergency Contact: [address] Phone: [phone]  Relation: Child    Past Surgical History:  Past Surgical History:   Procedure Laterality Date    FRACTURE SURGERY      GASTROSTOMY TUBE PLACEMENT N/A 3/3/2022    EGD PEG TUBE PLACEMENT performed by Denver Rucks, MD at [facility]        Immunization History: There is no immunization history on file for this patient.     Active Problems:  Patient Active Problem List   Diagnosis Code    Odontoid fracture ([facility] [address] S12.110A    Hypothyroid E03.9    Multiple rib fractures S22.49XA    TMJ (temporomandibular joint syndrome) M26.609    Bipolar disorder ([address] F31.9    Sciatica M54.30    Hiatal hernia K44.9    Abnormality of gait and mobility R26.9    Trigeminal neuralgia G50.0    Headache R51.9    Cervical neck pain with evidence of disc disease M50.90    Vomiting R11.10    Hypokalemia E87.6    Intra-abdominal infection B99.9    MRSA bacteremia R78.81, B95.62    Paroxysmal atrial fibrillation ([address] I48.0    Hypotension I95.9    Hypernatremia E87.0       Isolation/Infection:   Isolation            No Isolation          Patient Infection Status       Infection Onset Added Last Indicated Last Indicated By Review Planned Expiration Resolved Resolved By    None active    Resolved    COVID-19 (Rule Out) 10/09/20 10/09/20 10/09/20 Covid-19 Ambulatory (Ordered)   10/11/20 Rule-Out Test Resulted COVID-19 (Rule Out) 10/06/20 10/06/20 10/06/20 Covid-19 Ambulatory (Ordered)   10/08/20 Rule-Out Test Resulted    COVID-19 (Rule Out) 10/01/20 10/01/20 10/01/20 Covid-19 Ambulatory (Ordered)   10/03/20 Rule-Out Test Resulted            Nurse Assessment:  Last Vital Signs: /79   Pulse 94   Temp 99.2 °F (37.3 °C) (Axillary)   Resp 19   Ht 4' 11\" (1.499 m)   Wt 113 lb 4.8 oz (51.4 kg)   SpO2 95%   BMI 22.88 kg/m²     Last documented pain score (0-10 scale): Pain Level: 0  Last Weight:   Wt Readings from Last 1 Encounters:   03/10/22 113 lb 4.8 oz (51.4 kg)     Mental Status:  disoriented and alert    IV Access:  - PICC - site  L Upper Arm, insertion date: 3/15/2022    Nursing Mobility/ADLs:  Walking   Dependent  Transfer  Dependent  Bathing  Dependent  Dressing  Dependent  Toileting  Dependent  Feeding  Dependent  Med Admin  Dependent  Med Delivery    PEG    Wound Care Documentation and Therapy:        Elimination:  Continence: Bowel: No  Bladder: No  Urinary Catheter: Insertion Date: 3/8/2022    Colostomy/Ileostomy/Ileal Conduit: No       Date of Last BM: 3/22/2022    Intake/Output Summary (Last 24 hours) at 3/10/2022 0724  Last data filed at 3/10/2022 0615  Gross per 24 hour   Intake 3340.62 ml   Output 1100 ml   Net 2240.62 ml     I/O last 3 completed shifts: In: 4108 [I.V.:2327.6; Blood:400; IV Piggyback:1380.4]  Out: 1900 [Urine:1900]    Safety Concerns: At Risk for Falls and Aspiration Risk    Impairments/Disabilities:      Speech    Nutrition Therapy:  Current Nutrition Therapy:   - npo    Routes of Feeding: Parenteral Nutrition (PN)  Liquids: No Liquids  Daily Fluid Restriction: no  Last Modified Barium Swallow with Video (Video Swallowing Test): {Done Not Done OFAA:[id number]}    Treatments at the Time of Hospital Discharge:   Respiratory Treatments: albuterol  Oxygen Therapy:  is not on home oxygen therapy.   Ventilator:    - No ventilator support    Rehab Therapies: Physical Therapy and Occupational Therapy  Weight Bearing Status/Restrictions: No weight bearing restrictions  Other Medical Equipment (for information only, NOT a DME order):  wheelchair and hospital bed  Other Treatments: ***    Patient's personal belongings (please select all that are sent with patient):  None    RN SIGNATURE:  Electronically signed by Kai Hunter RN on 3/22/22 at 12:11 PM EDT    CASE MANAGEMENT/SOCIAL WORK SECTION    Inpatient Status Date: 03/08/22    Readmission Risk Assessment Score:  Readmission Risk              Risk of Unplanned Readmission:  12           Discharging to Facility/ Agency   Name: Amadeo Graf   Address:  [facility]:[phone]  Fax:[phone]    Dialysis Facility (if applicable)   Name:  Address:  Dialysis Schedule:  Phone:  Fax:    / signature: Electronically signed by NIRANJAN Dallas on 3/14/22 at 10:22 AM EDT    PHYSICIAN SECTION    Prognosis: Fair    Condition at Discharge: Stable    Rehab Potential (if transferring to Rehab): {Prognosis:2665295391}    Recommended Labs or Other Treatments After Discharge: ***    Physician Certification: I certify the above information and transfer of Ugo Walton  is necessary for the continuing treatment of the diagnosis listed and that she requires LTAC for less 30 days.      Update Admission H&P: {CHP DME Changes in HCJ:[id number]}    PHYSICIAN SIGNATURE:  Electronically signed by Teddy Tafoya DO on 3/22/22 at 12:15 PM EDT

## 2022-03-10 NOTE — PROGRESS NOTES
Internal Medicine Progress Note    [facility]    Brian Jacobson. Minnie Dickerson., PRITESH.JOAN.CJillianOEARNEST Boss D.O., STEVOOEARNEST Kang D.O. Nancy Winston, MSN, APRN, NP-C  Lily Culver. Lizett Ayoub, MSN, APRN-CNP     Primary Care Physician: Gloria Alvarado MD   Admitting Physician:  Krysta Granger DO  Admission date and time: 3/8/2022  6:25 PM    Room:  [address] [place] [address]  Admitting diagnosis: Sepsis, unspecified organism [A41.9]    Patient Name: Anup Ceja  MRN: [id number]    Date of Service: 3/10/2022     Subjective:  Jamia Hancock is a 67 y.o. female who was seen and examined today,3/10/2022, at the bedside. The patient was seen status post EGD PEG tube insertion, manual fecal disimpaction, control. No hemorrhage and rectal packing. Patient tolerated procedure. No postoperative nausea vomiting, chest pain or shortness of breath. Patient resting comfortably. Currently denying any abdominal pain. Does admit to fatigue. Son and sister present during my examination. ROS: Review of rest of 12 systems negative except as mentioned above-subjective section      Physical Exam:  I/O this shift: In: 500 [I.V.:500]  Out: 300 [Urine:300]    Intake/Output Summary (Last 24 hours) at 3/10/2022 1559  Last data filed at 3/10/2022 1245  Gross per 24 hour   Intake 3840.62 ml   Output 900 ml   Net 2940.62 ml   I/O last 3 completed shifts: In: 4108 [I.V.:2327.6; Blood:400; IV Piggyback:1380.4]  Out: 1900 [PBJTI:8432]  Patient Vitals for the past 96 hrs (Last 3 readings):   Weight   03/10/22 0052 113 lb 4.8 oz (51.4 kg)   03/09/22 0154 115 lb 14.4 oz (52.6 kg)   03/08/22 1830 115 lb (52.2 kg)     Vital Signs:   Blood pressure 122/67, pulse 94, temperature 97.6 °F (36.4 °C), temperature source Oral, resp. rate 20, height 4' 11\" (1.499 m), weight 113 lb 4.8 oz (51.4 kg), SpO2 92 %. General appearance:  Alert, responsive, oriented to person, place, and time.  Well preserved, alert, no distress. Head:  Normocephalic. No masses, lesions or tenderness. Eyes:  PERRLA. EOMI. Sclera clear. ENT:  Ears normal. Mucosa normal.  Neck:    Supple. Trachea midline. No thyromegaly. No JVD. No bruits. Heart:    Rhythm regular. Rate controlled. No murmurs. Lungs:    Diminished with bibasilar rales. Abdomen:   Soft. Surgical changes notes. ostomy appliance intact over old PEG site- greenish brown drainage. PEG tube. Bowel sounds positive. No organomegaly or masses. Tender with palpation. Binder on. Extremities:    Peripheral pulses present. No peripheral edema. No ulcers. No cyanosis. No clubbing. Neurologic:    Alert x 3. No focal deficit. Cranial nerves grossly intact. No focal weakness. Psych:   Behavior is normal. Mood appears normal. Speech is not rapid and/or pressured. Musculoskeletal:   Spine ROM normal. Muscular strength intact. Gait not assessed. Integumentary:  Excoriation under the breasts- yeast.   Genitalia/Breast:  Excoriation under the breasts- yeast. Carrera catheter.      Medication:  Scheduled Meds:   pantoprazole  40 mg IntraVENous BID    vancomycin  1,000 mg IntraVENous Q12H    miconazole   Topical BID    Petrolatum   Topical BID    polyethylene glycol-electrolytes  4,000 mL Per G Tube Once    piperacillin-tazobactam  3,375 mg IntraVENous Q8H    fluconazole  200 mg IntraVENous Q24H    [Held by provider] heparin (porcine)  5,000 Units SubCUTAneous 3 times per day     Continuous Infusions:   0.9% NaCl with KCl 40 mEq 100 mL/hr at 03/10/22 0615    sodium chloride         Objective Data:  CBC with Differential:    Lab Results   Component Value Date    WBC 7.6 03/10/2022    RBC 2.25 03/10/2022    HGB 7.6 03/10/2022    HCT 24.4 03/10/2022     03/10/2022    MCV 97.3 03/10/2022    MCH 31.6 03/10/2022    MCHC 32.4 03/10/2022    RDW 15.9 03/10/2022    NRBC 0.9 03/09/2022    LYMPHOPCT 25.0 03/10/2022    MONOPCT 7.0 03/10/2022    MYELOPCT 1.0 03/10/2022    BASOPCT 0.0 labs.  Labs as ordered. PT/OT to evaluate and treat. Monitor output from ostomy closely. Discharge planning discussed with /care coordinator. Continue current therapy. See orders for further plan of care. Greater than 35 minutes of critical care time was spent with the patient. This includes chart review, , and discussion with those consultants involved in the patient's care. More than 50% of my  time was spent at the bedside counseling/coordinating care with the patient and/or family with face to face contact. This time was spent reviewing notes and laboratory data as well as instructing and counseling the patient. Time I spent with the family or surrogate(s) is included only if the patient was incapable of providing the necessary information or participating in medical decisions. I also discussed the differential diagnosis and all of the proposed management plans with the patient and individuals accompanying the patient. Gilberto Morley requires this high level of physician care and nursing on the [facility] unit due the complexity of decision management and chance of rapid decline or death. Continued cardiac monitoring and higher level of nursing are required. I am readily available for any further decision-making and intervention. The patient was seen, examined and then discussed with Dr. Jimmy Saenz. Carol Wood, JOHN - CNP  3/10/2022  3:59 PM       I saw and evaluated the patient. I agree with the findings and the plan of care as documented in Carol Wood NP-C's  note.     Rachelle Nova DO, D.O., [facility]  4:27 PM  3/10/2022

## 2022-03-10 NOTE — PROGRESS NOTES
[facility] [employer] [facility]  [facility]  Progress Note    NAME: Rusty Parekh  MR:  [id number]  :   1945  DATE OF SERVICE:03/10/22    This is a face to face encounter with Rusty Parekh 67 y.o. female on 03/10/22    CHIEF COMPLAINT     ID following for No chief complaint on file. HISTORY OF PRESENT ILLNESS   Pt seen and examined  03/10/22  Seen postop EGD ESOPHAGOGASTRODUODENOSCOPY PEG TUBE INSERTION, exam under anesthesia, manual fecal disimpaction, control perianal hemorrhage, rectal packing     nad  Has pain     Patient is tolerating medications. No reported adverse drug reactions. REVIEW OF SYSTEMS     As stated above in the chief complaint, otherwise negative. CURRENT MEDICATIONS      pantoprazole  40 mg IntraVENous BID    vancomycin  1,000 mg IntraVENous Q12H    miconazole   Topical BID    Petrolatum   Topical BID    polyethylene glycol-electrolytes  4,000 mL Per G Tube Once    piperacillin-tazobactam  3,375 mg IntraVENous Q8H    fluconazole  200 mg IntraVENous Q24H    [Held by provider] heparin (porcine)  5,000 Units SubCUTAneous 3 times per day     Continuous Infusions:   0.9% NaCl with KCl 40 mEq 100 mL/hr at 03/10/22 0615    sodium chloride       PRN Meds:Petrolatum **AND** Petrolatum, sodium chloride, morphine, ondansetron, albuterol    PHYSICAL EXAM     /68   Pulse 92   Temp 97.6 °F (36.4 °C) (Infrared)   Resp 20   Ht 4' 11\" (1.499 m)   Wt 113 lb 4.8 oz (51.4 kg)   SpO2 92%   BMI 22.88 kg/m²   Temp  Av.3 °F (36.8 °C)  Min: 97.2 °F (36.2 °C)  Max: 99.2 °F (37.3 °C)  Constitutional:  The patient is awake, alert, and oriented. Skin:    Warm and dry. No rashes were noted. HEENT:   Round and reactive pupils. AT/NC  Neck:    Supple to movements. Chest:   No use of accessory muscles to breathe. Symmetrical expansion. Cardiovascular:  S1 and S2 are rhythmic and regular. No murmurs appreciated. Abdomen:   Positive bowel sounds to auscultation. tender peg  Extremities:   No clubbing, no cyanosis,   edema. CNS    AAxO   Lines: piv      DIAGNOSTIC RESULTS   Radiology:    Recent Labs     03/09/22  9771 03/09/22  1454 03/09/22  2025 03/09/22  2025 03/10/22  0324 03/10/22  0824 03/10/22  0834   WBC 12.2*  --   --   --   --   --  7.6   RBC 2.97*  --   --   --   --   --  2.25*   HGB 9.5*   < > 6.3*  --  8.6*  --  7.1*   HCT 30.6*   < > 20.2*   < > 27.6* 22.7* 21.9*   .0*  --   --   --   --   --  97.3   MCH 32.0  --   --   --   --   --  31.6   MCHC 31.0*  --   --   --   --   --  32.4   RDW 14.3  --   --   --   --   --  15.9*     --   --   --   --   --  183   MPV 10.5  --   --   --   --   --  10.6    < > = values in this interval not displayed. Recent Labs     03/09/22  0632 03/10/22  0834   * 149*   K 3.7 4.0   * 117*   CO2 25 19*   BUN 14 11   CREATININE 0.4* 0.5   GLUCOSE 81 74   PROT 5.7* 6.0*   LABALBU 2.1* 4.1   CALCIUM 8.7 9.6   BILITOT 0.4 0.7   ALKPHOS 165* 127*   AST 93* 53*   * 43*     Lab Results   Component Value Date    CRP 31.9 (H) 03/08/2022     Lab Results   Component Value Date    SEDRATE 109 (H) 03/08/2022     Recent Labs     03/08/22  2240 03/09/22  0632 03/09/22  1358 03/09/22  1454 03/10/22  0834   CRP 31.9*  --   --   --   --    PROCAL  --   --  0.24*  --   --    INR  --   --   --  1.4  --    PROTIME  --   --   --  15.8*  --    AST  --  93*  --   --  53*   ALT  --  101*  --   --  43*       Microbiology:   No results for input(s): COVID19 in the last 72 hours.   Lab Results   Component Value Date    BC 24 Hours no growth 03/08/2022    BLOODCULT2 24 Hours no growth 03/08/2022    ORG Klebsiella oxytoca 07/01/2014     Travessa Circe 852        urine 2/16/2022          FINAL IMPRESSION     Admitted for Sepsis   On treatment for MRSA sepsis/uti   Gib/fecal impaction        vancomycin (VANCOCIN) 1,000 mg in dextrose 5 % 250 mL IVPB, Q12H  piperacillin-tazobactam (ZOSYN) 3,375 mg in dextrose 5 % 50 mL IVPB extended infusion (mini-bag), Q8H  fluconazole (DIFLUCAN) in 0.9 % sodium chloride IVPB 200 mg, Q24H       · Monitor labs    Imaging and labs were reviewed per medical records. The patient was educated about the diagnosis, prognosis, indications, risks and benefits of treatment. An opportunity to ask questions was given to the patient/FAMILY. Thank you for involving me in the care of Kerry Vidalc will continue to follow. Please do not hesitate to call for any questions or concerns.     Electronically signed by Quita Gottron, MD on 3/10/2022 at 1:05 PM

## 2022-03-10 NOTE — CONSULTS
Nicolette Fernandez M.D. [facility]  Dr. Lupe Hendrickson M.D.,  Dr. Young Osorio M.D.,  Dr. Fidel Gonzalez D.O.,  Dr. Buddy Figueroa D.O. ,  Dr. Maikol Wood M.D.,  Dr. Santosh Lane D.O. Rajinder Gonzalez  67 y.o.  female      Re: GI bleed  Requesting physician: JOHN Romo  Date:2:30 PM 3/10/2022          HPI: 65-year-old female patient presenting to the hospital initially on 8 March with chief complaint of malfunctioning and infected PEG tube. Patient apparently had significant constipation with fecal impaction and yesterday developed rectal bleed with resulting anemia. Patient underwent upper endoscopy with PEG tube insertion this morning and also this morning by general surgery underwent evacuation of stool impaction. Patient is seen after above procedures and her son is at bedside. Patient currently denies any significant abdominal pain but reports some soreness at the PEG tube insertion site. Patient's son is unsure of previous colonoscopy. Patient hemoglobin is described as decreased to 6.3 yesterday and after transfusion hemoglobin 7.1 with hemoglobin of 21.9 today. Iron studies yesterday revealed no significant iron deficiency with low total iron-binding capacity. Additional laboratory work reveals no significant deterioration in renal function with BUN of 11 and creatinine of 0.5. Patient liver profile appears to show elevated transaminases borderline not exceeding 3 times normal and elevated alkaline phosphatase with nonelevated bilirubin. Patient has been treated with antibiotics for infected previous PEG tube.     Information sources:   -Patient  -family (son)  -medical record  -health care team    PMHx:  Past Medical History:   Diagnosis Date    Allergic     Anxiety disorder     Arthritis     Difficulty walking     Dysphagia     Hyperlipidemia     Hypertension     Interstitial cystitis     Lack of coordination     Mitral valve prolapse     pt. has a mitral valve prolapse    Neuromuscular disorder (HCC)     Other disorders of kidney and ureter in diseases classified elsewhere     Pelvis fracture (HCC)     multiple fxs pelvis    Psychiatric problem     Thyroid disease     Trigeminal neuralgia     Unspecified dementia without behavioral disturbance (HCC)        PSHx:  Past Surgical History:   Procedure Laterality Date    FRACTURE SURGERY      GASTROSTOMY TUBE PLACEMENT N/A 3/3/2022    EGD PEG TUBE PLACEMENT performed by Lucy Quintana MD at [address] [employer] [facility]        Meds:  Current Facility-Administered Medications   Medication Dose Route Frequency Provider Last Rate Last Admin    pantoprazole (PROTONIX) injection 40 mg  40 mg IntraVENous BID Brandyn Neal MD   40 mg at 03/10/22 0901    0.9% NaCl with KCl 40 mEq infusion   IntraVENous Continuous Brandyn Neal  mL/hr at 03/10/22 0615 Rate Verify at 03/10/22 0615    vancomycin (VANCOCIN) 1,000 mg in dextrose 5 % 250 mL IVPB  1,000 mg IntraVENous Q12H Brandyn Neal MD   Stopped at 03/10/22 0423    miconazole (MICOTIN) 2 % powder   Topical BID Brandyn Neal MD   Given at 03/10/22 0902    Petrolatum 42 % OINT   Topical BID Brandyn Neal MD   Given at 03/10/22 0901    And    Petrolatum 42 % OINT   Topical TID PRN Brandyn Neal MD        polyethylene glycol-electrolytes (NULYTELY) solution 4,000 mL  4,000 mL Per G Tube Once Brandyn Neal MD        0.9 % sodium chloride infusion   IntraVENous PRN Brandyn Neal MD        piperacillin-tazobactam (ZOSYN) 3,375 mg in dextrose 5 % 50 mL IVPB extended infusion (mini-bag)  3,375 mg IntraVENous Q8H Brandyn Neal MD 12.5 mL/hr at 03/10/22 1314 3,375 mg at 03/10/22 1314    morphine (PF) injection 2 mg  2 mg IntraVENous Q4H PRN Brandyn Neal MD        ondansetron [facility]) injection 4 mg  4 mg IntraVENous Q6H PRN Brandyn Neal MD        fluconazole (DIFLUCAN) in 0.9 % sodium chloride IVPB 200 mg  200 mg IntraVENous Q24H Thi Dash  mL/hr at 03/10/22 0905 200 mg at 03/10/22 0905    albuterol (PROVENTIL) nebulizer solution 2.5 mg  2.5 mg Nebulization Q6H PRN MD [facility] by provider] heparin (porcine) injection 5,000 Units  5,000 Units SubCUTAneous 3 times per day Jaky Feng DO   5,000 Units at 03/09/22 1345       SocHx:  Social History     Socioeconomic History    Marital status:      Spouse name: Not on file    Number of children: 2    Years of education: 12    Highest education level: Not on file   Occupational History    Not on file   Tobacco Use    Smoking status: Never Smoker    Smokeless tobacco: Not on file   Vaping Use    Vaping Use: Never used   Substance and Sexual Activity    Alcohol use: No    Drug use: No    Sexual activity: Never   Other Topics Concern    Not on file   Social History Narrative    Not on file     Social Determinants of Health     Financial Resource Strain: Low Risk     Difficulty of Paying Living Expenses: Not hard at all   Food Insecurity: No Food Insecurity    Worried About [facility] in the Last Year: Never true    [place] of Food in the Last Year: Never true   Transportation Needs: No Transportation Needs    Lack of Transportation (Medical): No    Lack of Transportation (Non-Medical): No   Physical Activity: Inactive    Days of Exercise per Week: 0 days    Minutes of Exercise per Session: 0 min   Stress: No Stress Concern Present    Feeling of Stress : Not at all   Social Connections: Unknown    Frequency of Communication with Friends and Family: Three times a week    Frequency of Social Gatherings with Friends and Family:  Three times a week    Attends Latter day Services: Never    Active Member of Clubs or Organizations: No    Attends Club or Organization Meetings: Never    Marital Status: Patient refused   Intimate Partner Violence: Not At Risk    Fear of Current or Ex-Partner: No    Emotionally Abused: No    Physically Abused: No    Sexually Abused: No   Housing Stability: Low Risk     Unable to Pay for Housing in the Last Year: No    Number of Places Lived in the Last Year: 1    Unstable Housing in the Last Year: No       FamHx:  Family History   Problem Relation Age of Onset    Cancer Mother     High Blood Pressure Mother     Cancer Sister     High Blood Pressure Brother     High Cholesterol Brother     Stroke Maternal Grandmother     Learning Disabilities Son        Allergy:  Allergies   Allergen Reactions    Abilify [Aripiprazole]     Depakote [Divalproex Sodium]     Dye [Iodides] Hives     Pt. Is allergic to CT dye. Breaks out in hive.s  Pt. Is allergic to CT dye. Breaks out in hives. Pt. Gets pre-medicated with benadryl prior to CT.  Geodon [Ziprasidone Hcl]     Levsin [Hyoscyamine]     Lithium     Neurontin [Gabapentin]     Risperidone And Related     Tegretol [Carbamazepine]     Tramadol     Trileptal [Oxcarbazepine]     Zyprexa [Olanzapine]          ROS: As described in the HPI and in addition is negative upon detailed review of systems or unobtainable unless otherwise stated in this dictation. PE:  /71   Pulse 91   Temp 97.3 °F (36.3 °C) (Oral)   Resp 20   Ht 4' 11\" (1.499 m)   Wt 113 lb 4.8 oz (51.4 kg)   SpO2 93%   BMI 22.88 kg/m²     Gen. NAD/ female  Head: Atraumatic/normocephalic  Eyes:Anicteric sclera/no conjunctival erythema  ENT: Moist oral mucosa/no discharge from nose ears  Neck: Supple with trachea midline  Chest: Symmetric excursion/nonlabored respirations  Cor: Regular without gallop. S1-S2 appreciated  Abd.: Soft/postsurgical.  Appears nontender but sore at the PEG tube site.   Old PEG tube site with dressing  Extr.:  BLE with some edema  Muscles: Decreased tone and bulk throughout  Skin: Warm and dry        DATA:     Lab Results   Component Value Date    WBC 7.6 03/10/2022    RBC 2.25 03/10/2022 HGB 7.1 03/10/2022    HCT 21.9 03/10/2022    MCV 97.3 03/10/2022    MCH 31.6 03/10/2022    MCHC 32.4 03/10/2022    RDW 15.9 03/10/2022     03/10/2022    MPV 10.6 03/10/2022     Lab Results   Component Value Date     03/10/2022    K 4.0 03/10/2022     03/10/2022    CO2 19 03/10/2022    BUN 11 03/10/2022    CREATININE 0.5 03/10/2022    CALCIUM 9.6 03/10/2022    PROT 6.0 03/10/2022    LABALBU 4.1 03/10/2022    BILITOT 0.7 03/10/2022    ALKPHOS 127 03/10/2022    AST 53 03/10/2022    ALT 43 03/10/2022     Lab Results   Component Value Date    LIPASE 29 06/23/2014     Lab Results   Component Value Date    AMYLASE 202 06/24/2014         ASSESSMENT/PLAN:  Patient Active Problem List   Diagnosis    Odontoid fracture ([address]    Hypothyroid    Multiple rib fractures    TMJ (temporomandibular joint syndrome)    Bipolar disorder (HCC)    Sciatica    Hiatal hernia    Abnormality of gait and mobility    Trigeminal neuralgia    Headache    Cervical neck pain with evidence of disc disease    Vomiting    Hypokalemia    Intra-abdominal infection    MRSA bacteremia    Paroxysmal atrial fibrillation (HCC)    Hypotension    Hypernatremia    Moderate protein-calorie malnutrition ([address]     1. Anemia/rectal bleed  -Likely related to ulceration from fecal impaction  -Disimpaction/treatment per general surgery earlier today  -No immediate plan for colonoscopy at this time  -Monitor H&H  -Consider endoscopy in case of precipitous drop in H&H/overt bleed    2. Elevated transaminase  -Nonobstructive liver profile  -Likely related to general medical condition/medication  -Monitor labs    3. PEG tube   -Infected PEG tube site/insertion of new PEG  -Per general surgery    Above has been discussed with patient and her son at bedside and all questions answered to their satisfaction. They verbalized understanding and agreement with the plan as delineated.   Thank you for the opportunity to see this patient in consultation    Juana Yip MD  3/10/2022  2:30 PM    NOTE:  This report was transcribed using voice recognition software. Every effort was made to ensure accuracy; however, inadvertent computerized transcription errors may be present.

## 2022-03-10 NOTE — PROGRESS NOTES
OCCUPATIONAL THERAPY INITIAL EVALUATION     [facility]  [address]         Date:3/10/2022                                                   Patient Name: Paige Rowland     MRN: [id number]     : 1945     Room: [address] [place] [address]       Evaluating OT: Mell Chong OTR/L; [id number]       Referring Provider and Orders/Date:   OT eval and treat Start: 22, End: 22, ONE TIME, Standing Count: 1 Occurrences, R    Tanya Samson DO     Diagnosis: failure to thrive     Surgery: 3/3: EGD PEG TUBE PLACEMENT       Pertinent Medical History:        Past Medical History:   Diagnosis Date    Allergic     Anxiety disorder     Arthritis     Difficulty walking     Dysphagia     Hyperlipidemia     Hypertension     Interstitial cystitis     Lack of coordination     Mitral valve prolapse     pt. has a mitral valve prolapse    Neuromuscular disorder ([address]     Other disorders of kidney and ureter in diseases classified elsewhere     Pelvis fracture ([address]     multiple fxs pelvis    Psychiatric problem     Thyroid disease     Trigeminal neuralgia     Unspecified dementia without behavioral disturbance ([address]           Past Surgical History:   Procedure Laterality Date    FRACTURE SURGERY      GASTROSTOMY TUBE PLACEMENT N/A 3/3/2022    EGD PEG TUBE PLACEMENT performed by Shima Lopez MD at [address]        Precautions:  Fall Risk, abdominal drain, armenta, O2    Recommended placement: subacute    Assessment of current deficits     [x] Functional mobility  [x]ADLs  [x] Strength               [x]Cognition     [x] Functional transfers   [x] IADLs         [x] Safety Awareness   [x]Endurance     [x] Fine Coordination              [x] Balance      [] Vision/perception   []Sensation      [x]Gross Motor Coordination  [x] ROM  [] Delirium                   [x] Motor Control     OT PLAN OF CARE   OT POC based on physician orders, patient diagnosis and results of clinical assessment    Frequency/Duration 1-3 days/wk for 2 weeks PRN   Specific OT Treatment Interventions to include:   * Instruction/training on adapted ADL techniques and AE recommendations to increase functional independence within precautions       * Training on energy conservation strategies, correct breathing pattern and techniques to improve independence/tolerance for self-care routine  * Functional transfer/mobility training/DME recommendations for increased independence, safety, and fall prevention  * Patient/Family education to increase follow through with safety techniques and functional independence  * Recommendation of environmental modifications for increased safety with functional transfers/mobility and ADLs  * Cognitive retraining/development of therapeutic activities to improve problem solving, judgement, memory, and attention for increased safety/participation in ADL/IADL tasks  * Therapeutic exercise to improve motor endurance, ROM, and functional strength for ADLs/functional transfers  * Therapeutic activities to facilitate/challenge dynamic balance, stand tolerance for increased safety and independence with ADLs  * Therapeutic activities to facilitate gross/fine motor skills for increased independence with ADLs  * Neuro-muscular re-education: facilitation of righting/equilibrium reactions, midline orientation, scapular stability/mobility, normalization of muscle tone, and facilitation of volitional active controled movement  * Positioning to improve skin integrity, interaction with environment and functional independence     Recommended Adaptive Equipment/DME:  TBD      Home Living: Pt admitted from SNF with plans to return. Pt was a poor historian throughout the session.       Bathroom setup: sponge bathing    DME owned: w/c     Prior Level of Function: max A with ADLs , dep with IADLs; out of bed to w/c-no report on type of transfer   Driving: no   Occupation: retired   Enjoys: watching sitcoms     Pain Level: \"Yes\" in abdomin; nursing aware  Cognition: A&O: 2/4 to self and time; Follows 1-2 step directions   Memory:  Poor+   Sequencing:  poor   Problem solving:  Poor   Judgement/safety:  Poor+    AM-PAC Daily Activity Inpatient   How much help for putting on and taking off regular lower body clothing?: Total  How much help for Bathing?: Total  How much help for Toileting?: Total  How much help for putting on and taking off regular upper body clothing?: Total  How much help for taking care of personal grooming?: Total  How much help for eating meals?: Total  AM-PAC Inpatient Daily Activity Raw Score: 6  AM-PAC Inpatient ADL T-Scale Score : 17.07  ADL Inpatient CMS 0-100% Score: 100  ADL Inpatient CMS G-Code Modifier : CN     Functional Assessment:     Initial Eval Status  Date: 3/10/2022   Treatment Status  Date: STGs = LTGs  Time frame: 10-14 days   Feeding Dependent per physical ability and due to NPO status  Max A if appropriate   Grooming Dependent for face/hand wash from supine and hair comb from sitting EOB with A x 2  Maximal Assist    UB Dressing Dependent for gown management  Maximal Assist    LB Dressing Dependent for donning tabitha socks from supine  Not Appropriate-PLOF      Bathing Dependent from supine level for sponge bathing  Maximal Assist    Toileting Dependent with incontinence with heavy bleeding. A x 2 required for rolling to maintain side lying to the right; dep with armenta management  Dep Assist x 1    Bed Mobility  Supine to sit: Maximal Assist x 2  Sit to supine: Maximal Assist  X 2  Rolling to the left with max A and to the right with max A x 2. Supine to sit: Maximal Assist   Sit to supine: Maximal Assist    Functional Transfers  NT due to pt overall debility, decreased activity tolerance, balance deficits, safety and fall risk.      Maximal Assist    Functional Mobility  Not appropriate at time of eval. To re-assess at at later time if appropriate. Not appropriate at time of eval. To re-assess at at later time if appropriate. Balance Sitting:     Static:  poor    Dynamic:poor  Standing: NT  Sitting:     Static:  Poor+   Activity Tolerance Vitals with activity:2L 82-91%  With ADLs and sitting EOB. Sitting EOB for 5min period overall. Increase sitting tolerance for >10min with stable vital signs for carry over into functional tranfers and indep in ADLs   Visual/  Perceptual Glasses: not Present; wears glasses all the time    Reports change in vision since admission: No     NA   Nate UE Strengthening  3+/5 generally  4/5MMT generally for carry over into self care, functional transfers and functional mobility with AD. Hand Dominance  [x] Right  [] Left    AROM (PROM) Strength Additional Info:    RUE  WFL 3+/5 good  and poor+ FMC/dexterity noted during ADL tasks  Opposition [x] Intact [] Impaired  Finger to nose [x] Intact [] Impaired     LUE WFL 3+/5 good  and poor+ FMC/dexterity noted during ADL tasks  Opposition [x] Intact [] Impaired  Finger to nose [x] Intact [] Impaired     Hearing: WFL   Sensation:  No c/o numbness or tingling   Tone: WFL   Edema: none    Comments: Upon arrival patient supine with PT. Pt required dep A for most UB ADLs and dep A LB ADLs tasks. Limited with max A x 2 for toileting and sitting EOB. Patient required co-evaluation with physical therapy for a portion of the session due to the level of physical assistance needed and the patients endurance level to tolerate separate sessions, as indicated under evaluation time. The biggest barriers reflect that of functional transfers, functional mobility, UB/LB ADLs, cognition, activity tolerance, balance, safety and strengthening. At end of session, patient supine with call light and phone within reach, all lines and tubes intact.   Overall patient demonstrated decreased independence and safety during completion of ADL/functional transfer/mobility tasks compared to PLOF. Nursing updated on pt position and status following OT eval and reported that the bleeding is a known issue. Pt would benefit from continued skilled OT to increase safety and independence with completion of ADL/IADL tasks for functional independence and quality of life. Treatment: OT treatment provided this date includes:   Instruction, education and training on safe facilitation and adapted techniques for completion of ADLs. These include neuromuscular reeducation to facilitate balance/righting reactions,proper positioning/alignment to improve interaction with environment and overall function and on adapted techniques/work simplification for completion of ADLs. Education provided on hand/feet placement with bed rails and body mechanics for fall prevention. Extended time to complete all tasks, including skilled monitoring of patient's response during treatment session and vital signs. Prior to and at the end of session, environmental modifications / line management completed for patients safety and efficiency of treatment session. See above for further details. Rehab Potential: fair for established goals     Patient / Family Goal: Pain management      Patient and/or family were instructed on functional diagnosis, prognosis/goals and OT plan of care. Demonstrated fair- understanding. Eval Complexity: Low  · History: Brief review of medical records and additional review of physical, cognitive, or psychosocial history related to current functional performance  · Exam: 3+ performance deficits  · Assistance/Modification: Mod assistance or modifications required to perform tasks. May have comorbidities that affect occupational performance.     Time In: 2548  Time Out: 0910  Total Treatment Time: 11    Min Units   OT Eval Low 97165  x  1   OT Eval Medium 88942      OT Eval High 16300      OT Re-Eval [id number]       Therapeutic Ex 96676       Therapeutic Activities 78888  11 1 ADL/Self Care 32200       Orthotic Management 99036       Manual [id number]     Neuro Re-Ed 82087       Non-Billable Time          Evaluation Time additionally includes thorough review of current medical information, gathering information on past medical history/social history and prior level of function, interpretation of standardized testing/informal observation of tasks, assessment of data and development of plan of care and goals.             Ana Fleming OTR/L; [id number]

## 2022-03-10 NOTE — PROGRESS NOTES
Patient is seen in follow-up for MRSA bacteremia, needs KADEN    Subjective:     Ms. Farzana Gagnon denies chest pain or shortness of breath  Laying in bed no apparent distress    ROS:  Limited since patient started, no nausea no vomiting, no fever no chills    Medication side effects: none    Scheduled Meds:   pantoprazole  40 mg IntraVENous BID    vancomycin  1,000 mg IntraVENous Q12H    miconazole   Topical BID    Petrolatum   Topical BID    polyethylene glycol-electrolytes  4,000 mL Per G Tube Once    piperacillin-tazobactam  3,375 mg IntraVENous Q8H    fluconazole  200 mg IntraVENous Q24H    [Held by provider] heparin (porcine)  5,000 Units SubCUTAneous 3 times per day     Continuous Infusions:   0.9% NaCl with KCl 40 mEq 100 mL/hr at 03/10/22 0615    sodium chloride       PRN Meds:Petrolatum **AND** Petrolatum, sodium chloride, morphine, ondansetron, albuterol    I/O last 3 completed shifts: In: 4108 [I.V.:2327.6; Blood:400; IV Piggyback:1380.4]  Out: 1900 [DQD:2008]  I/O this shift: In: 500 [I.V.:500]  Out: 300 [Urine:300]      Objective:      Physical Exam:   /67   Pulse 94   Temp 97.6 °F (36.4 °C) (Oral)   Resp 20   Ht 4' 11\" (1.499 m)   Wt 113 lb 4.8 oz (51.4 kg)   SpO2 92%   BMI 22.88 kg/m²   CONSTITUTIONAL:  awake, alert, cooperative, no apparent distress, and appears stated age  HEAD:  normocepalic, without obvious abnormality, atraumatic  NECK:  Supple, symmetrical, trachea midline, no adenopathy, thyroid symmetric, not enlarged and no tenderness, skin normal  LUNGS:  No increased work of breathing, No accessory muscle use or intercostal retractions, good air exchange, clear to auscultation bilaterally, no crackles or wheezing  CARDIOVASCULAR:  Normal apical impulse, regular rate and rhythm, normal S1 and S2, no S3 or S4, and no murmur noted, no edema, no JVD, no carotid bruit.   ABDOMEN:  Soft, nontender, no masses, no hepatomegaly, no splenomegaly, BS+  MUSCULOSKELETAL:  No clubbing no cyanosis. there is no redness, warmth, or swelling of the joints  NEUROLOGIC:  Alert  SKIN:  no bruising or bleeding, normal skin color, texture, turgor and no redness, warmth, or swelling      Cardiographics  I personally reviewed the telemetry monitor strips with the following interpretation: Sinus rhythm    Echocardiogram:     Imaging  XR CHEST PORTABLE   Final Result   Small bilateral pleural effusions         CT ABDOMEN PELVIS WO CONTRAST Additional Contrast? None   Final Result   Completely dislodged percutaneous gastrostomy catheter with multiple small   gas locules and a small amount of fluid extending along the subcutaneous   tract of the prior gastrostomy tube. No drainable fluid collection. Severely dilated rectum which measures up to 8.4 cm and demonstrates   circumferential wall thickening. These findings are consistent with fecal   impaction and superimposed stercoral colitis. Streak artifact from barium   contrast within rectal stool limits assessment for pneumatosis. Within this   limitation, there is no obvious rectal pneumatosis. Acute appearing fractures involving the left superior and inferior pubic   rami. Severe osteopenia limits assessment for nondisplaced sacral fractures,   and given the findings in the left pelvis, a nondisplaced sacral fracture   cannot be excluded. Nonobstructing 11 mm calculus in the left renal pelvis. Lab Review   Lab Results   Component Value Date     03/10/2022    K 4.0 03/10/2022     03/10/2022    CO2 19 03/10/2022    BUN 11 03/10/2022    CREATININE 0.5 03/10/2022    GLUCOSE 74 03/10/2022    CALCIUM 9.6 03/10/2022     Lab Results   Component Value Date    WBC 7.6 03/10/2022    HGB 7.6 03/10/2022    HCT 24.4 03/10/2022    MCV 97.3 03/10/2022     03/10/2022     I have personally reviewed the laboratory, cardiac diagnostic and radiographic testing as outlined above:    Assessment:     1.   MRSA bacteremia: KADEN is on hold until GI work-up is completed  2. Short burst of atrial fibrillation: will follow closely  3. Hypotension: Resolved  4. Hypernatremia: Improving    Recommendations:     1. Patient is at acceptable risk for perioperative cardiovascular event for the planned procedure (EGD), patient may proceed without any further cardiac testing. Please feel free to call for any further information  2. Continue current treatment  3. Basic metabolic panel and CBC in a.m. Discussed with family at bedside  Discussed with nursing staff  Electronically signed by Buster Rinne, MD on 3/10/2022 at 3:43 PM  NOTE: This report was transcribed using voice recognition software.  Every effort was made to ensure accuracy; however, inadvertent computerized transcription errors may be present

## 2022-03-10 NOTE — PROGRESS NOTES
Physical Therapy Initial Evaluation/Plan of Care    Room #:  OR POOL/NONE  Patient Name: Adeline Swartz  YOB: 1945  MRN: [id number]    Date of Service: 3/10/2022     Tentative placement recommendation: Subacute rehab  Equipment recommendation: To be determined      Evaluating Physical Therapist: Mickie Iyer #[id number]      Specific Provider Orders/Date/Referring Provider :   03/08/22 2030   PT eval and treat Start: 03/08/22 2030, End: 03/08/22 2030, ONE TIME, Standing Count: 1 Occurrences, R    Amanda Rivera DO      Admitting Diagnosis:   Sepsis, unspecified organism [A41.9]      Surgery:      PEG tube placement on 3/3/202      Patient Active Problem List   Diagnosis    [facility]ont[facility] [address]    Hypothyroid    Multiple rib fractures    TMJ (temporomandibular joint syndrome)    Bipolar disorder ([facility] [address]    Sciatica    Hiatal hernia    Abnormality of gait and mobility    Trigeminal neuralgia    Headache    Cervical neck pain with evidence of disc disease    Vomiting    Hypokalemia    Intra-abdominal infection    MRSA bacteremia    Paroxysmal atrial fibrillation (HCC)    Hypotension    Hypernatremia    Moderate protein-calorie malnutrition (HCC)        ASSESSMENT of Current Deficits Patient exhibits decreased strength, balance and endurance impairing functional mobility, transfers, gait distance and tolerance to activity. Patient required maximal assist x2 to sit EOB, multiplane instability with posterior lean. No report on type of transfer to /, nursing gave clearance for EOB only today. The patient will benefit from continued skilled therapy to increase strength and improve balance for safe functional mobility, to decrease risk of falls, and to meet goals at discharge.          PHYSICAL THERAPY  PLAN OF CARE       Physical therapy plan of care is established based on physician order,  patient diagnosis and clinical assessment    Current Treatment Recommendations:    -Bed Mobility: Lower extremity exercises   -Sitting Balance: Incorporate reaching activities to activate trunk muscles , Hands on support to maintain midline , Facilitate active trunk muscle engagement  and Facilitate postural control in all planes   -Standing Balance: Perform strengthening exercises in standing to promote motor control with or without upper extremity support  and Instruct patient on adequate base of support to maintain balance  -Transfers: Provide instruction on proper hand and foot position for adequate transfer of weight onto lower extremities and use of gait device if needed and Cues for hand placement, technique and safety. Provide stabilization to prevent fall     PT long term treatment goals are located in below grid    Patient and or family understand(s) diagnosis, prognosis, and plan of care. Frequency of treatments: Patient will be seen  3-5 times/week.          Prior Level of Function: Patient out of bed to w/c- no history of type of transfer  Rehab Potential: good  for baseline    Past medical history:   Past Medical History:   Diagnosis Date    Allergic     Anxiety disorder     Arthritis     Difficulty walking     Dysphagia     Hyperlipidemia     Hypertension     Interstitial cystitis     Lack of coordination     Mitral valve prolapse     pt. has a mitral valve prolapse    Neuromuscular disorder (HCC)     Other disorders of kidney and ureter in diseases classified elsewhere     Pelvis fracture (HCC)     multiple fxs pelvis    Psychiatric problem     Thyroid disease     Trigeminal neuralgia     Unspecified dementia without behavioral disturbance ([facility] [address]      Past Surgical History:   Procedure Laterality Date    FRACTURE SURGERY      GASTROSTOMY TUBE PLACEMENT N/A 3/3/2022    EGD PEG TUBE PLACEMENT performed by Fiona Wahl MD at [address] [facility] [facility] [address]        SUBJECTIVE:    Precautions:  falls , abdominal drain, armenta, O2, Incontinent of bowel  Social history: Came to hospital from [facility] ED and reported there from nursing home. Patient trevor a poor historian. Equipment owned: Wheelchair,      [address]   How much difficulty turning over in bed?: A Lot  How much difficulty sitting down on / standing up from a chair with arms?: A Lot  How much difficulty moving from lying on back to sitting on side of bed?: A Lot  How much help from another person moving to and from a bed to a chair?: A Lot  How much help from another person needed to walk in hospital room?: Total  How much help from another person for climbing 3-5 steps with a railing?: Total  AM-PAC Inpatient Mobility Raw Score : 10  AM-PAC Inpatient T-Scale Score : 32.29  Mobility Inpatient CMS 0-100% Score: 76.75  Mobility Inpatient CMS G-Code Modifier : CL    Nursing cleared patient for PT evaluation. The admitting diagnosis and active problem list as listed above have been reviewed prior to the initiation of this evaluation. Nursing gave approval for bed mobility and sitting EOB only today. OBJECTIVE;   Initial Evaluation  Date: 3/10/2022 Treatment Date:     Short Term/ Long Term   Goals   Was pt agreeable to Eval/treatment? Yes  To be met in 5 days   Pain level   0/10       Bed Mobility    Rolling: Maximal assist of 1    Supine to sit: Maximal assist of  2    Sit to supine: Maximal assist of  2    Scooting: Maximal assist of 1    Rolling: Minimal assist of 1    Supine to sit: Maximal assist of 1    Sit to supine: Maximal assist of 1    Scooting: Moderate assist of 1     Transfers Sit to stand: Not assessed  d/t to pt overall debility, decreased activity tolerance, balance deficits, safety and fall risk. Sit to stand: Maximal assist of 1    Ambulation    not assessed at this time due to not appropriate. not appropriate at this time.  To re-assess at a later time if appropriate     ROM Within functional limits    Increase range of motion 10% of affected joints    Strength BUE:  refer to OT eval  RLE:  3/5  LLE:  3/5  Increase strength in affected mm groups by 1/3 grade   Balance Sitting EOB:  poor, posterior lean, max assist x2 to remain upright   Dynamic Standing:  not assessed   Sitting EOB:  fair   Dynamic Standing: fair      Patient is Alert & Oriented x person and time and follows one step directions    Sensation:  Patient  denies numbness/tingling   Edema:  no   Endurance: poor    Vitals: 2 liters nasal cannula   Blood Pressure at rest  Blood Pressure during session    Heart Rate at rest  Heart Rate during session    SPO2 at rest 93%  SPO2 during session 82-92%     Patient education  Patient educated on role of Physical Therapy, risks of immobility, safety and plan of care,  importance of mobility while in hospital , ankle pumps, quad set and glut set for edema control, blood clot prevention, safety  and positioning for skin integrity and comfort     Patient response to education:   Pt verbalized understanding Pt demonstrated skill Pt requires further education in this area   Yes Partial Yes      Treatment:  Patient practiced and was instructed/facilitated in the following treatment: Patient performed supine exercises. Pt rolled x 4 reps for self hygiene, incont of bowel. chux and linens changed. Donned socks. Patient assisted to EOB. Sat edge of bed 10 minutes with Maximal assist of  2 to increase dynamic sitting balance and activity tolerance. posterior lean, multiplane instability throughout, worked on weight shifting and core/trunk strength. Patient assisted back to supine, max assist x2 to [facility]. Therapeutic Exercises:AAORM ankle pumps, heel slide, hip abduction/adduction, straight leg raise, shoulder elevation, wrist flexion/extension and finger flexion/extension  x 15 reps. At end of session, patient in bed with OT present call light and phone within reach,  all lines and tubes intact, nursing notified.       Patient would benefit from continued skilled Physical Therapy to improve functional independence and quality of life. Patient's/ family goals   none stated    Time in  0815  Time out  0854    Total Treatment Time  10 minutes    Evaluation time includes thorough review of current medical information, gathering information on past medical history/social history and prior level of function, completion of standardized testing/informal observation of tasks, assessment of data, and development of Plan of care and goals.      CPT codes:  Low Complexity PT evaluation (72778)  Therapeutic exercises (28661)   10 minutes  1 unit(s)  Non billable time 9 minutes co-treat with OT    Nina Cleary, PT

## 2022-03-10 NOTE — PROGRESS NOTES
Pharmacy Consultation Note  (Antibiotic Dosing and Monitoring)    Initial consult date: 3/9/22  Consulting physician/provider: Dr. Jesus Smith  Drug: Vancomycin  Indication: MRSA bacteremia    Age/  Gender Height Weight IBW  Allergy Information   76 y.o./female 4' 11\" (149.9 cm) 115 lb (52.2 kg)     Patient must be at least 60 in tall to calculate ideal body weight   Abilify [aripiprazole], Depakote [divalproex sodium], Dye [iodides], Geodon [ziprasidone hcl], Levsin [hyoscyamine], Lithium, Neurontin [gabapentin], Risperidone and related, Tegretol [carbamazepine], Tramadol, Trileptal [oxcarbazepine], and Zyprexa [olanzapine]      Renal Function:  Recent Labs     03/09/22  0632 03/10/22  0834   BUN 14 11   CREATININE 0.4* 0.5       Intake/Output Summary (Last 24 hours) at 3/10/2022 1233  Last data filed at 3/10/2022 1232  Gross per 24 hour   Intake 3840.62 ml   Output 1100 ml   Net 2740.62 ml       Vancomycin Monitoring:  Trough:  No results for input(s): VANCOTROUGH in the last 72 hours. Random:  No results for input(s): VANCORANDOM in the last 72 hours. Vancomycin Administration Times:  Recent vancomycin administrations                   vancomycin (VANCOCIN) 1,000 mg in dextrose 5 % 250 mL IVPB (mg) 1,000 mg New Bag 03/10/22 0224     1,000 mg New Bag 03/09/22 1636                  Assessment:  · Patient is a 67 y.o. female who has been initiated on vancomycin for MRSA bacteremia per chart review. The positive cultures are from an outside facility that were just reported, so has not been treated. Repeat blood cultures have been collected here and show NGTD. · CrCl ~40-50 mL/min  · To dose vancomycin, pharmacy will be utilizing [employer] calculation software for goal AUC/CLIFF 400-600 mg/L-hr    Plan:  · Will continue vancomycin 1,000 IV every 12 hours  · Trough tomorrow @ 1430.  Hold dose if trough is >20 mcg/mL  · Will continue to monitor renal function   · Clinical pharmacy will continue to follow      Lata Cardoso PharmZE, BCPS 3/10/2022 12:33 PM   [phone]

## 2022-03-10 NOTE — ANESTHESIA POSTPROCEDURE EVALUATION
Department of Anesthesiology  Postprocedure Note    Patient: Rusty Parekh  MRN: [id number]  YOB: 1945  Date of evaluation: 3/10/2022  Time:  1:10 PM     Procedure Summary     Date: 03/10/22 Room / Location: [address] [place] [address] / [address] [place] [address]    Anesthesia Start: 1106 Anesthesia Stop: 0027    Procedure: EGD ESOPHAGOGASTRODUODENOSCOPY PEG TUBE INSERTION; EVACUATION OF STOOL IMPACTON (N/A ) Diagnosis: (PEG TUBE DISLODGEMENT)    Surgeons: Jose Gimenez MD Responsible Provider: Cheryle Barefoot, MD    Anesthesia Type: MAC ASA Status: 4          Anesthesia Type: MAC    River Phase I: River Score: 9    River Phase II:      Last vitals: Reviewed and per EMR flowsheets.        Anesthesia Post Evaluation    Patient location during evaluation: PACU  Patient participation: complete - patient participated  Level of consciousness: awake  Pain score: 0  Airway patency: patent  Nausea & Vomiting: no nausea  Complications: no  Cardiovascular status: blood pressure returned to baseline  Respiratory status: acceptable  Hydration status: euvolemic

## 2022-03-10 NOTE — OP NOTE
Operative Note      Patient: Tina Jane  YOB: 1945  MRN: [id number]    Date of Procedure: 3/10/2022    Pre-Op Diagnosis: PEG TUBE DISLODGEMENT, RECTAL BLEEDING, FECAL IMPACTION    Post-Op Diagnosis: PEG site infection, rectal bleeding secondary to stercoral ulcer at the anus, fecal impaction, No upper GI source of bleeding       Procedure(s):  EGD ESOPHAGOGASTRODUODENOSCOPY PEG TUBE INSERTION, exam under anesthesia, manual fecal disimpaction, control perianal hemorrhage, rectal packing    Surgeon(s):  Reny Tran MD    Assistant:   First Assistant: Mir Jennings    Anesthesia: Monitor Anesthesia Care    Estimated Blood Loss (mL): 5 cc    Complications: None    Specimens:   * No specimens in log *    Implants:  * No implants in log *      Drains:   Gastrostomy/Enterostomy/Jejunostomy PEG-Jejunostomy LUQ (Active)   Drainage Appearance Purulent 03/09/22 1551   Site Description Bleeding 03/03/22 1247   Drain Status Clamped 03/03/22 1247   Surrounding Skin Reddened 03/09/22 1551   Dressing Status New drainage; Changed 03/03/22 1247   Dressing Type Other (Comment) 03/09/22 1551   G-Tube Care Completed Yes 03/03/22 1247       Urethral Catheter Non-latex 16 fr (Active)   Output (mL) 200 mL 03/10/22 0507       Findings: PEG site infection with abdominal wall cellulitis, fecal rectal impaction with stercoral ulcer at the anal verge, small bleeding subcutaneous tissue no thrombosed or overt hemorrhoids    Detailed Description of Procedure:   Is a 58-year-old female who presented with PEG tube dislodgment. She had previously had a PEG tube placed 9 days ago and had been tugging at it and pulled loose. She had some evidence of infection and cellulitis around the old PEG tube site therefore she was consented for replacement under MAC anesthesia with endoscopy.   She also developed rectal bleeding during her admission and evidence of fecal impaction on her CT scan of the manual exam.  She was taken the operating placed in the supine position and administered monitored anesthesia care. She was prepped and draped initially performed upper endoscopy inserted a flexible gastroscope through the oropharynx threaded down through the esophagus. The retropharyngeal space had extensive dry mucosal tissue and mucus which was evacuated. As I threaded down through the esophagus was very tortuous but no evidence of bleeding. The GE junction was at 41 cm as I advanced past the neck immediately appreciate bubbling coming from the anterior abdominal wall the previous PEG tube site. Visualization in intragastric showed evidence of gastric wall necrosis at the site of the previous PEG tube with green appearing material.  I elected not to replace the PEG tube through the site. The scope was passed through the pylorus and no ulcerations or bleeding were present. I retracted and moved distal antrum to it transilluminated across the abdominal wall and the ballottement technique visualize direct indentation of the gastric wall. Anesthetizing skin over the area of visual light illumination made a small incision and then inserted a angiocatheter through the abdominal wall threaded through a snare and then grasped through the gastroscope and retracted through the oropharynx. It was connected to a 20 [employer] Sana PEG tube and using a Ponsky pull-through technique a pulled through the oropharynx through the stomach and through the abdominal wall. It was secured in place at 4 cm. A gastric up was reinserted to confirm placement of the distal stomach. We then cleaned the abdominal wall place a bacitracin ointment around both PEG tube sites I then applied a urostomy care over the previous PEG tube site due to the infected nature of it and anticipated continued drainage from gastric contents. The PEG tube was attached to gravity in place at the bedside. An abdominal binder was placed.   We then turned the patient in the right lateral decubitus position did not rectal exam under direct visualization immediately appreciate large amounts of incarcerated feces. This was manually removed until I could feel no more stool in the rectal vault. Immediately could appreciate small areas of ulceration around the anus on the squamocolumnar junction as well as distally of the squamous cell on the left buttock more than right. With superficial ulcerations with grade 2 pressure ulceration. Area was cleaned and a small area of oozing was appreciated cautery was used to stop the oozing. I then used 2 pieces of Surgicel snow and placed it on the areas of raw tissue. Also placed a Gelfoam plug in order to maintain hemostasis at the completion. Hemostasis was achieved and the patient was then turned in the supine position awoken and transferred to recovery unit in stable condition. All instrument counts lap counts were correct at completion of procedure.     Electronically signed by Lianne Son MD on 3/10/2022 at 11:46 AM

## 2022-03-10 NOTE — PROGRESS NOTES
Call out to [address] [place] [address], [facility] Candelario , updated on status and plan of care. Will be in for visitation. Electronically signed by Aracelis Rowland RN on 3/10/2022 at 10:53 AM

## 2022-03-10 NOTE — ANESTHESIA PRE PROCEDURE
Department of Anesthesiology  Preprocedure Note       Name:  Betsy Moss   Age:  67 y.o.  :  1945                                          MRN:  [id number]         Date:  3/10/2022      Surgeon: Vicki Griffiths):  Linda Judge MD    Procedure: Procedure(s):  EGD ESOPHAGOGASTRODUODENOSCOPY PEG TUBE INSERTION    Medications prior to admission:   Prior to Admission medications    Medication Sig Start Date End Date Taking? Authorizing Provider   vitamin D (CHOLECALCIFEROL) 25 MCG (1000 UT) TABS tablet Take 1,000 Units by mouth daily    Historical Provider, MD   paliperidone (INVEGA) 1.5 MG extended release tablet Take 1.5 mg by mouth every morning    Historical Provider, MD   famotidine (PEPCID) 40 MG tablet Take 40 mg by mouth daily    Historical Provider, MD   sertraline (ZOLOFT) 100 MG tablet Take 100 mg by mouth daily    Historical Provider, MD   buPROPion (WELLBUTRIN SR) 100 MG extended release tablet Take 100 mg by mouth 2 times daily    Historical Provider, MD   cyanocobalamin 1000 MCG/ML injection Inject 1,000 mcg into the muscle once    Historical Provider, MD   melatonin 5 MG TBDP disintegrating tablet Take 5 mg by mouth nightly    Historical Provider, MD   fluconazole (DIFLUCAN) 200 MG tablet Take 200 mg by mouth daily    Historical Provider, MD   levothyroxine (SYNTHROID) 50 MCG tablet Take 50 mcg by mouth Daily. Historical Provider, MD   acetaminophen (TYLENOL) 325 MG tablet Take 650 mg by mouth every 4 hours as needed for Pain or Fever. Historical Provider, MD       Current medications:    No current facility-administered medications for this visit. No current outpatient medications on file.      Facility-Administered Medications Ordered in Other Visits   Medication Dose Route Frequency Provider Last Rate Last Admin    pantoprazole (PROTONIX) injection 40 mg  40 mg IntraVENous BID Belle Ser, DO   40 mg at 03/10/22 0901    0.9% NaCl with KCl 40 mEq infusion   IntraVENous Continuous Anibal Ortiz, APRN -  mL/hr at 03/10/22 0615 Rate Verify at 03/10/22 0615    vancomycin (VANCOCIN) 1,000 mg in dextrose 5 % 250 mL IVPB  1,000 mg IntraVENous Q12H Britt Najjar, DO   Stopped at 03/10/22 0423    miconazole (MICOTIN) 2 % powder   Topical BID Britt Najjar, DO   Given at 03/09/22 2338    Petrolatum 42 % OINT   Topical BID Britt Najjar, DO   Given at 03/09/22 2339    And    Petrolatum 42 % OINT   Topical TID PRN Britt Najjar, DO        polyethylene glycol-electrolytes (NULYTELY) solution 4,000 mL  4,000 mL Per G Tube Once Wilner Lerner MD        0.9 % sodium chloride infusion   IntraVENous PRN Aniya Roman,         piperacillin-tazobactam (ZOSYN) 3,375 mg in dextrose 5 % 50 mL IVPB extended infusion (mini-bag)  3,375 mg IntraVENous Q8H Britt Najjar, DO   Stopped at 03/10/22 0901    morphine (PF) injection 2 mg  2 mg IntraVENous Q4H PRN Britt Najjar, DO        ondansetron [facility]) injection 4 mg  4 mg IntraVENous Q6H PRN Britt Najjar, DO        fluconazole (DIFLUCAN) in 0.9 % sodium chloride IVPB 200 mg  200 mg IntraVENous Q24H Britt Fatimar,  mL/hr at 03/10/22 0905 200 mg at 03/10/22 0905    albuterol (PROVENTIL) nebulizer solution 2.5 mg  2.5 mg Nebulization Q6H PRN Britt Najjar, DO        [Held by provider] heparin (porcine) injection 5,000 Units  5,000 Units SubCUTAneous 3 times per day Britt Fatimar, DO   5,000 Units at 03/09/22 1345       Allergies: Allergies   Allergen Reactions    Abilify [Aripiprazole]     Depakote [Divalproex Sodium]     Dye [Iodides] Hives     Pt. Is allergic to CT dye. Breaks out in hive.s  Pt. Is allergic to CT dye. Breaks out in hives. Pt. Gets pre-medicated with benadryl prior to CT.         Geodon [Ziprasidone Hcl]     Levsin [Hyoscyamine]     Lithium     Neurontin [Gabapentin]     Risperidone And Related     Tegretol [Carbamazepine]     Tramadol     Trileptal [Oxcarbazepine]     Zyprexa [Olanzapine]        Problem List:    Patient Active Problem List   Diagnosis Code    Odontoid fracture ([address] S12.110A    Hypothyroid E03.9    Multiple rib fractures S22.49XA    TMJ (temporomandibular joint syndrome) M26.609    Bipolar disorder ([address] F31.9    Sciatica M54.30    Hiatal hernia K44.9    Abnormality of gait and mobility R26.9    Trigeminal neuralgia G50.0    Headache R51.9    Cervical neck pain with evidence of disc disease M50.90    Vomiting R11.10    Hypokalemia E87.6    Intra-abdominal infection B99.9    MRSA bacteremia R78.81, B95.62    Paroxysmal atrial fibrillation (HCC) I48.0    Hypotension I95.9    Hypernatremia E87.0    Moderate protein-calorie malnutrition (HCC) E44.0       Past Medical History:        Diagnosis Date    Allergic     Anxiety disorder     Arthritis     Difficulty walking     Dysphagia     Hyperlipidemia     Hypertension     Interstitial cystitis     Lack of coordination     Mitral valve prolapse     pt. has a mitral valve prolapse    Neuromuscular disorder (HCC)     Other disorders of kidney and ureter in diseases classified elsewhere     Pelvis fracture (HCC)     multiple fxs pelvis    Psychiatric problem     Thyroid disease     Trigeminal neuralgia     Unspecified dementia without behavioral disturbance ([address]        Past Surgical History:        Procedure Laterality Date    FRACTURE SURGERY      GASTROSTOMY TUBE PLACEMENT N/A 3/3/2022    EGD PEG TUBE PLACEMENT performed by Zachery Bloom MD at [facility]        Social History:    Social History     Tobacco Use    Smoking status: Never Smoker    Smokeless tobacco: Not on file   Substance Use Topics    Alcohol use: No                                Counseling given: Not Answered      Vital Signs (Current): There were no vitals filed for this visit.                                            BP Readings from Last 3 Encounters:   03/10/22 123/73   03/03/22 132/72   03/03/22 (!) 104/56       NPO Status:                                                                                 BMI:   Wt Readings from Last 3 Encounters:   03/10/22 113 lb 4.8 oz (51.4 kg)   03/03/22 104 lb (47.2 kg)     There is no height or weight on file to calculate BMI.    CBC:   Lab Results   Component Value Date    WBC 7.6 03/10/2022    RBC 2.25 03/10/2022    HGB 7.1 03/10/2022    HCT 21.9 03/10/2022    MCV 97.3 03/10/2022    RDW 15.9 03/10/2022     03/10/2022       CMP:   Lab Results   Component Value Date     03/10/2022    K 4.0 03/10/2022     03/10/2022    CO2 19 03/10/2022    BUN 11 03/10/2022    CREATININE 0.5 03/10/2022    GFRAA >60 03/10/2022    LABGLOM >60 03/10/2022    GLUCOSE 74 03/10/2022    PROT 6.0 03/10/2022    CALCIUM 9.6 03/10/2022    BILITOT 0.7 03/10/2022    ALKPHOS 127 03/10/2022    AST 53 03/10/2022    ALT 43 03/10/2022       POC Tests: No results for input(s): POCGLU, POCNA, POCK, POCCL, POCBUN, POCHEMO, POCHCT in the last 72 hours. Coags:   Lab Results   Component Value Date    PROTIME 15.8 03/09/2022    INR 1.4 03/09/2022       HCG (If Applicable): No results found for: PREGTESTUR, PREGSERUM, HCG, HCGQUANT     ABGs: No results found for: PHART, PO2ART, FNE1BUN, RUM2HIR, BEART, L4ZCBMQH     Type & Screen (If Applicable):  No results found for: LABABO, LABRH    Drug/Infectious Status (If Applicable):  No results found for: HIV, HEPCAB    COVID-19 Screening (If Applicable):   Lab Results   Component Value Date    COVID19 Not Detected 04/09/2021           Anesthesia Evaluation  Patient summary reviewed  Airway: Mallampati: IV  TM distance: <3 FB   Neck ROM: limited  Mouth opening: < 3 FB Dental:          Pulmonary: breath sounds clear to auscultation                            ROS comment: Allergy.    Cardiovascular:    (+) hypertension:, valvular problems/murmurs: MVP, hyperlipidemia      ECG reviewed  Rhythm: regular  Rate: normal           Beta Blocker:  Not on Beta Blocker      ROS comment: EKG: Sinus Rhythm 83, possible Anterior MI, Inferior/ Lateral St & T changes. Neuro/Psych:   (+) neuromuscular disease:, headaches:, psychiatric history:             ROS comment: Difficult Walking. Lack of Coordination. S/P Fracture Pelvis. GI/Hepatic/Renal:   (+) hiatal hernia,          ROS comment: Dysphagia. Interstitial Cystitis. .   Endo/Other:    (+) hypothyroidism::., .                 Abdominal:             Vascular: Other Findings:               Anesthesia Plan      MAC     ASA 4       Induction: intravenous. MIPS: Postoperative opioids intended. Anesthetic plan and risks discussed with patient. Plan discussed with CRNA.     Attending anesthesiologist reviewed and agrees with Brad Heredia MD   3/10/2022

## 2022-03-11 ENCOUNTER — ANESTHESIA (OUTPATIENT)
Dept: ENDOSCOPY | Age: 77
DRG: 862 | End: 2022-03-11
Payer: COMMERCIAL

## 2022-03-11 ENCOUNTER — ANESTHESIA EVENT (OUTPATIENT)
Dept: ENDOSCOPY | Age: 77
DRG: 862 | End: 2022-03-11
Payer: COMMERCIAL

## 2022-03-11 VITALS — SYSTOLIC BLOOD PRESSURE: 104 MMHG | OXYGEN SATURATION: 87 % | DIASTOLIC BLOOD PRESSURE: 44 MMHG

## 2022-03-11 LAB
ALBUMIN SERPL-MCNC: 3.4 G/DL (ref 3.5–5.2)
ALP BLD-CCNC: 125 U/L (ref 35–104)
ALT SERPL-CCNC: 34 U/L (ref 0–32)
ANION GAP SERPL CALCULATED.3IONS-SCNC: 15 MMOL/L (ref 7–16)
AST SERPL-CCNC: 27 U/L (ref 0–31)
BASOPHILS ABSOLUTE: 0 E9/L (ref 0–0.2)
BASOPHILS RELATIVE PERCENT: 0 % (ref 0–2)
BILIRUB SERPL-MCNC: 0.7 MG/DL (ref 0–1.2)
BUN BLDV-MCNC: 6 MG/DL (ref 6–23)
CALCIUM SERPL-MCNC: 9.4 MG/DL (ref 8.6–10.2)
CHLORIDE BLD-SCNC: 108 MMOL/L (ref 98–107)
CO2: 19 MMOL/L (ref 22–29)
CREAT SERPL-MCNC: 0.4 MG/DL (ref 0.5–1)
EKG ATRIAL RATE: 103 BPM
EKG P AXIS: 41 DEGREES
EKG P-R INTERVAL: 122 MS
EKG Q-T INTERVAL: 340 MS
EKG QRS DURATION: 78 MS
EKG QTC CALCULATION (BAZETT): 445 MS
EKG R AXIS: 24 DEGREES
EKG T AXIS: 17 DEGREES
EKG VENTRICULAR RATE: 103 BPM
EOSINOPHILS ABSOLUTE: 0.88 E9/L (ref 0.05–0.5)
EOSINOPHILS RELATIVE PERCENT: 7 % (ref 0–6)
GFR AFRICAN AMERICAN: >60
GFR NON-AFRICAN AMERICAN: >60 ML/MIN/1.73
GLUCOSE BLD-MCNC: 77 MG/DL (ref 74–99)
HCT VFR BLD CALC: 26 % (ref 34–48)
HCT VFR BLD CALC: 26.1 % (ref 34–48)
HCT VFR BLD CALC: 26.6 % (ref 34–48)
HCT VFR BLD CALC: 27.4 % (ref 34–48)
HCT VFR BLD CALC: 28 % (ref 34–48)
HEMOGLOBIN: 8.3 G/DL (ref 11.5–15.5)
HEMOGLOBIN: 8.7 G/DL (ref 11.5–15.5)
HEMOGLOBIN: 9.1 G/DL (ref 11.5–15.5)
HYPOCHROMIA: ABNORMAL
LYMPHOCYTES ABSOLUTE: 1.75 E9/L (ref 1.5–4)
LYMPHOCYTES RELATIVE PERCENT: 14 % (ref 20–42)
MCH RBC QN AUTO: 31.1 PG (ref 26–35)
MCHC RBC AUTO-ENTMCNC: 31.8 % (ref 32–34.5)
MCV RBC AUTO: 97.8 FL (ref 80–99.9)
MONOCYTES ABSOLUTE: 1.25 E9/L (ref 0.1–0.95)
MONOCYTES RELATIVE PERCENT: 10 % (ref 2–12)
NEUTROPHILS ABSOLUTE: 8.63 E9/L (ref 1.8–7.3)
NEUTROPHILS RELATIVE PERCENT: 69 % (ref 43–80)
PDW BLD-RTO: 15.9 FL (ref 11.5–15)
PLATELET # BLD: 217 E9/L (ref 130–450)
PMV BLD AUTO: 11.3 FL (ref 7–12)
POLYCHROMASIA: ABNORMAL
POTASSIUM SERPL-SCNC: 4.6 MMOL/L (ref 3.5–5)
RBC # BLD: 2.67 E12/L (ref 3.5–5.5)
SODIUM BLD-SCNC: 142 MMOL/L (ref 132–146)
TOTAL PROTEIN: 5.9 G/DL (ref 6.4–8.3)
TROPONIN, HIGH SENSITIVITY: 46 NG/L (ref 0–9)
WBC # BLD: 12.5 E9/L (ref 4.5–11.5)

## 2022-03-11 PROCEDURE — 6360000002 HC RX W HCPCS

## 2022-03-11 PROCEDURE — 80053 COMPREHEN METABOLIC PANEL: CPT

## 2022-03-11 PROCEDURE — 7100000011 HC PHASE II RECOVERY - ADDTL 15 MIN: Performed by: INTERNAL MEDICINE

## 2022-03-11 PROCEDURE — 93312 ECHO TRANSESOPHAGEAL: CPT | Performed by: INTERNAL MEDICINE

## 2022-03-11 PROCEDURE — 2709999900 HC NON-CHARGEABLE SUPPLY: Performed by: INTERNAL MEDICINE

## 2022-03-11 PROCEDURE — 36415 COLL VENOUS BLD VENIPUNCTURE: CPT

## 2022-03-11 PROCEDURE — 6370000000 HC RX 637 (ALT 250 FOR IP): Performed by: NURSE PRACTITIONER

## 2022-03-11 PROCEDURE — 6370000000 HC RX 637 (ALT 250 FOR IP): Performed by: SURGERY

## 2022-03-11 PROCEDURE — 3700000000 HC ANESTHESIA ATTENDED CARE: Performed by: INTERNAL MEDICINE

## 2022-03-11 PROCEDURE — 3700000001 HC ADD 15 MINUTES (ANESTHESIA): Performed by: INTERNAL MEDICINE

## 2022-03-11 PROCEDURE — 2580000003 HC RX 258: Performed by: SURGERY

## 2022-03-11 PROCEDURE — 85025 COMPLETE CBC W/AUTO DIFF WBC: CPT

## 2022-03-11 PROCEDURE — 85018 HEMOGLOBIN: CPT

## 2022-03-11 PROCEDURE — 93325 DOPPLER ECHO COLOR FLOW MAPG: CPT

## 2022-03-11 PROCEDURE — 93005 ELECTROCARDIOGRAM TRACING: CPT | Performed by: INTERNAL MEDICINE

## 2022-03-11 PROCEDURE — C9113 INJ PANTOPRAZOLE SODIUM, VIA: HCPCS | Performed by: SURGERY

## 2022-03-11 PROCEDURE — 85014 HEMATOCRIT: CPT

## 2022-03-11 PROCEDURE — 6370000000 HC RX 637 (ALT 250 FOR IP): Performed by: RADIOLOGY

## 2022-03-11 PROCEDURE — 6360000002 HC RX W HCPCS: Performed by: SURGERY

## 2022-03-11 PROCEDURE — B24BZZ4 ULTRASONOGRAPHY OF HEART WITH AORTA, TRANSESOPHAGEAL: ICD-10-PCS | Performed by: INTERNAL MEDICINE

## 2022-03-11 PROCEDURE — 84484 ASSAY OF TROPONIN QUANT: CPT

## 2022-03-11 PROCEDURE — 93325 DOPPLER ECHO COLOR FLOW MAPG: CPT | Performed by: INTERNAL MEDICINE

## 2022-03-11 PROCEDURE — 2580000003 HC RX 258

## 2022-03-11 PROCEDURE — 2060000000 HC ICU INTERMEDIATE R&B

## 2022-03-11 PROCEDURE — 2700000000 HC OXYGEN THERAPY PER DAY

## 2022-03-11 PROCEDURE — 93312 ECHO TRANSESOPHAGEAL: CPT

## 2022-03-11 PROCEDURE — 7100000010 HC PHASE II RECOVERY - FIRST 15 MIN: Performed by: INTERNAL MEDICINE

## 2022-03-11 RX ORDER — ACETAMINOPHEN 325 MG/1
650 TABLET ORAL EVERY 4 HOURS PRN
Status: DISCONTINUED | OUTPATIENT
Start: 2022-03-11 | End: 2022-03-22 | Stop reason: HOSPADM

## 2022-03-11 RX ORDER — SODIUM CHLORIDE 9 MG/ML
INJECTION, SOLUTION INTRAVENOUS CONTINUOUS PRN
Status: DISCONTINUED | OUTPATIENT
Start: 2022-03-11 | End: 2022-03-11 | Stop reason: SDUPTHER

## 2022-03-11 RX ORDER — QUETIAPINE FUMARATE 25 MG/1
25 TABLET, FILM COATED ORAL NIGHTLY
Status: DISCONTINUED | OUTPATIENT
Start: 2022-03-11 | End: 2022-03-16

## 2022-03-11 RX ORDER — PROPOFOL 10 MG/ML
INJECTION, EMULSION INTRAVENOUS PRN
Status: DISCONTINUED | OUTPATIENT
Start: 2022-03-11 | End: 2022-03-11 | Stop reason: SDUPTHER

## 2022-03-11 RX ADMIN — PROPOFOL 20 MG: 10 INJECTION, EMULSION INTRAVENOUS at 13:46

## 2022-03-11 RX ADMIN — SODIUM CHLORIDE: 9 INJECTION, SOLUTION INTRAVENOUS at 13:43

## 2022-03-11 RX ADMIN — MORPHINE SULFATE 2 MG: 2 INJECTION, SOLUTION INTRAMUSCULAR; INTRAVENOUS at 16:38

## 2022-03-11 RX ADMIN — ANTI-FUNGAL POWDER MICONAZOLE NITRATE TALC FREE: 1.42 POWDER TOPICAL at 09:37

## 2022-03-11 RX ADMIN — PETROLATUM: 420 OINTMENT TOPICAL at 09:37

## 2022-03-11 RX ADMIN — PIPERACILLIN SODIUM AND TAZOBACTAM SODIUM 3375 MG: 3; 375 INJECTION, POWDER, FOR SOLUTION INTRAVENOUS at 05:12

## 2022-03-11 RX ADMIN — FLUCONAZOLE IN SODIUM CHLORIDE 200 MG: 2 INJECTION, SOLUTION INTRAVENOUS at 09:30

## 2022-03-11 RX ADMIN — POTASSIUM CHLORIDE AND SODIUM CHLORIDE: 900; 300 INJECTION, SOLUTION INTRAVENOUS at 00:26

## 2022-03-11 RX ADMIN — VANCOMYCIN HYDROCHLORIDE 1000 MG: 1 INJECTION, POWDER, LYOPHILIZED, FOR SOLUTION INTRAVENOUS at 03:31

## 2022-03-11 RX ADMIN — PIPERACILLIN SODIUM AND TAZOBACTAM SODIUM 3375 MG: 3; 375 INJECTION, POWDER, FOR SOLUTION INTRAVENOUS at 21:53

## 2022-03-11 RX ADMIN — POTASSIUM CHLORIDE AND SODIUM CHLORIDE: 900; 300 INJECTION, SOLUTION INTRAVENOUS at 22:46

## 2022-03-11 RX ADMIN — PANTOPRAZOLE SODIUM 40 MG: 40 INJECTION, POWDER, FOR SOLUTION INTRAVENOUS at 09:38

## 2022-03-11 RX ADMIN — QUETIAPINE FUMARATE 25 MG: 25 TABLET ORAL at 21:39

## 2022-03-11 RX ADMIN — MORPHINE SULFATE 2 MG: 2 INJECTION, SOLUTION INTRAMUSCULAR; INTRAVENOUS at 09:40

## 2022-03-11 RX ADMIN — PIPERACILLIN SODIUM AND TAZOBACTAM SODIUM 3375 MG: 3; 375 INJECTION, POWDER, FOR SOLUTION INTRAVENOUS at 12:47

## 2022-03-11 RX ADMIN — MORPHINE SULFATE 2 MG: 2 INJECTION, SOLUTION INTRAMUSCULAR; INTRAVENOUS at 22:01

## 2022-03-11 RX ADMIN — PROPOFOL 20 MG: 10 INJECTION, EMULSION INTRAVENOUS at 13:50

## 2022-03-11 RX ADMIN — PETROLATUM: 420 OINTMENT TOPICAL at 21:53

## 2022-03-11 RX ADMIN — ACETAMINOPHEN 650 MG: 325 TABLET ORAL at 22:44

## 2022-03-11 RX ADMIN — PANTOPRAZOLE SODIUM 40 MG: 40 INJECTION, POWDER, FOR SOLUTION INTRAVENOUS at 21:39

## 2022-03-11 RX ADMIN — ANTI-FUNGAL POWDER MICONAZOLE NITRATE TALC FREE: 1.42 POWDER TOPICAL at 21:53

## 2022-03-11 RX ADMIN — PROPOFOL 40 MG: 10 INJECTION, EMULSION INTRAVENOUS at 13:44

## 2022-03-11 ASSESSMENT — PAIN SCALES - GENERAL
PAINLEVEL_OUTOF10: 7
PAINLEVEL_OUTOF10: 0
PAINLEVEL_OUTOF10: 7
PAINLEVEL_OUTOF10: 0
PAINLEVEL_OUTOF10: 7
PAINLEVEL_OUTOF10: 0
PAINLEVEL_OUTOF10: 0

## 2022-03-11 ASSESSMENT — PAIN SCALES - PAIN ASSESSMENT IN ADVANCED DEMENTIA (PAINAD)
NEGVOCALIZATION: 0
BREATHING: 0
CONSOLABILITY: 0
BODYLANGUAGE: 0
FACIALEXPRESSION: 0
BODYLANGUAGE: 0
BODYLANGUAGE: 0
BREATHING: 0
TOTALSCORE: 0
BREATHING: 0
NEGVOCALIZATION: 0
NEGVOCALIZATION: 0
TOTALSCORE: 0
FACIALEXPRESSION: 0
TOTALSCORE: 0
FACIALEXPRESSION: 0

## 2022-03-11 NOTE — PROGRESS NOTES
Internal Medicine Progress Note    [facility]    Catrina Solomon, STEVOOEARNEST Swartz D.O., LUCHO Andrade Cap, MSN, APRN, NP-C  Gucci Coleman. Andres Umana, MSN, APRN-CNP     Primary Care Physician: Alannah Barker MD   Admitting Physician:  Fabian Amato DO  Admission date and time: 3/8/2022  6:25 PM    Room:  [address] [place] [address]  Admitting diagnosis: Sepsis, unspecified organism [A41.9]    Patient Name: Luciano Blanc  MRN: [id number]    Date of Service: 3/11/2022     Subjective:  Bert Toribio is a 67 y.o. female who was seen and examined today,3/11/2022, at the bedside. The patient w is doing well status post  PEG tube insertion. States minimal pain. Admits to fatigue. Patient resting comfortably. Currently denying any abdominal pain. Sister, Leesa Diaz, present at the bedside    ROS: Review of rest of 12 systems negative except as mentioned above-subjective section      Physical Exam:  No intake/output data recorded. Intake/Output Summary (Last 24 hours) at 3/11/2022 1319  Last data filed at 3/11/2022 0837  Gross per 24 hour   Intake 2842.19 ml   Output 1400 ml   Net 1442.19 ml   I/O last 3 completed shifts: In: 4650.5 [I.V.:3538.4; Blood:400; IV Piggyback:712.1]  Out: 7264 [Urine:2300]  Patient Vitals for the past 96 hrs (Last 3 readings):   Weight   03/11/22 0124 118 lb 3.2 oz (53.6 kg)   03/10/22 0052 113 lb 4.8 oz (51.4 kg)   03/09/22 0154 115 lb 14.4 oz (52.6 kg)     Vital Signs:   Blood pressure 132/65, pulse 106, temperature 98.3 °F (36.8 °C), temperature source Infrared, resp. rate 23, height 4' 11\" (1.499 m), weight 118 lb 3.2 oz (53.6 kg), SpO2 93 %. General appearance:  Alert, responsive, oriented to person, pleasantly confused today. Well preserved, alert, no distress. Head:  Normocephalic. No masses, lesions or tenderness. Eyes:  PERRLA. EOMI. Sclera clear. ENT:  Ears normal. Mucosa normal.  Neck:    Supple. Trachea midline. No thyromegaly. No JVD. No bruits. Heart:    Rhythm regular. Rate controlled. No murmurs. Lungs:    Diminished with bibasilar rales. Abdomen:   Soft. Surgical changes notes. ostomy appliance intact over old PEG site- greenish brown drainage. New PEG tube asymptomatic. Bowel sounds positive. No organomegaly or masses. Tender with palpation. Binder on. Extremities:    Peripheral pulses present. No peripheral edema. No ulcers. No cyanosis. No clubbing. Neurologic:    Alert x 3. No focal deficit. Cranial nerves grossly intact. No focal weakness. Psych:   Behavior is normal. Mood appears normal. Speech is not rapid and/or pressured. Musculoskeletal:   Spine ROM normal. Muscular strength intact. Gait not assessed. Integumentary:  Excoriation under the breasts- yeast.   Genitalia/Breast:  Excoriation under the breasts- yeast. Carrera catheter.      Medication:  Scheduled Meds:   [START ON 3/12/2022] vancomycin  1,250 mg IntraVENous Q24H    pantoprazole  40 mg IntraVENous BID    miconazole   Topical BID    Petrolatum   Topical BID    polyethylene glycol-electrolytes  4,000 mL Per G Tube Once    piperacillin-tazobactam  3,375 mg IntraVENous Q8H    fluconazole  200 mg IntraVENous Q24H    [Held by provider] heparin (porcine)  5,000 Units SubCUTAneous 3 times per day     Continuous Infusions:   0.9% NaCl with KCl 40 mEq 100 mL/hr at 03/11/22 0535    sodium chloride         Objective Data:  CBC with Differential:    Lab Results   Component Value Date    WBC 12.5 03/11/2022    RBC 2.67 03/11/2022    HGB 8.3 03/11/2022    HCT 26.0 03/11/2022     03/11/2022    MCV 97.8 03/11/2022    MCH 31.1 03/11/2022    MCHC 31.8 03/11/2022    RDW 15.9 03/11/2022    NRBC 0.9 03/09/2022    LYMPHOPCT 14.0 03/11/2022    MONOPCT 10.0 03/11/2022    MYELOPCT 1.0 03/10/2022    BASOPCT 0.0 03/11/2022    MONOSABS 1.25 03/11/2022    LYMPHSABS 1.75 03/11/2022    EOSABS 0.88 03/11/2022    BASOSABS 0.00 03/11/2022     CMP:    Lab Results   Component Value Date     03/10/2022    K 4.0 03/10/2022     03/10/2022    CO2 19 03/10/2022    BUN 11 03/10/2022    CREATININE 0.5 03/10/2022    GFRAA >60 03/10/2022    LABGLOM >60 03/10/2022    GLUCOSE 74 03/10/2022    PROT 6.0 03/10/2022    LABALBU 4.1 03/10/2022    CALCIUM 9.6 03/10/2022    BILITOT 0.7 03/10/2022    ALKPHOS 127 03/10/2022    AST 53 03/10/2022    ALT 43 03/10/2022       Wound Documentation:   Wound 03/10/22 Abdomen Medial;Upper (Active)   Dressing Status Other (Comment) 03/10/22 1319   Drainage Amount Scant 03/10/22 1319   Number of days: 0       Assessment:  1. Sepsis in the setting of dislodged PEG tube with developing abscess and intra-abdominal infection status post new PEG tube insertion, manual fecal disimpaction, controlled. No hemorrhage, and rectal packing performed by Dr. Sai Murphy on March 10  2. MRSA bacteremia x2 bottles  3. Dilated rectum with fecal impaction  4. Anemia/rectal bleeding  5. Elevated transaminase  6. PAF  7. Pubic rami fractures  8. Intertrigo under the breasts bilaterally   9. hypernatremia with overall free water deficit  10. Failure to thrive with severe protein calorie malnutrition  11. Neuromuscular disorder of undetermined etiology with known psychiatric dysfunction as per the chart  12. Hypothyroidism  13. Essential hypertension with hypotension  14. Hyperlipidemia      · Plan:   · Today's labs are pending. Await results and adjust treatment accordingly. · As per general surgery may resume tube feedings tomorrow, March 12. · Continue IV fluids for gentle hydration  · Monitor hemoglobin and transfuse as warranted. · Cardiology and GI team are following the patient and recommendations of been reviewed. · Monitor labs. Labs as ordered. · PT/OT to evaluate and treat. · Discharge planning discussed with /care coordinator. · Continue current therapy.   See orders for further plan of care.    Greater than 35 minutes of critical care time was spent with the patient. This includes chart review, , and discussion with those consultants involved in the patient's care. More than 50% of my  time was spent at the bedside counseling/coordinating care with the patient and/or family with face to face contact. This time was spent reviewing notes and laboratory data as well as instructing and counseling the patient. Time I spent with the family or surrogate(s) is included only if the patient was incapable of providing the necessary information or participating in medical decisions. I also discussed the differential diagnosis and all of the proposed management plans with the patient and individuals accompanying the patient. Eugune Blind requires this high level of physician care and nursing on the [facility] unit due the complexity of decision management and chance of rapid decline or death. Continued cardiac monitoring and higher level of nursing are required. I am readily available for any further decision-making and intervention. The patient was seen, examined and then discussed with Dr. Omer Wilson. Silvia Acosta, JOHN - CNP  3/11/2022  1:19 PM       I saw and evaluated the patient. I agree with the findings and the plan of care as documented in Silvia Acosta NP-C's  note.     Shirley Hawkins DO, D.O., FACOI  4:16 PM  3/11/2022

## 2022-03-11 NOTE — PROGRESS NOTES
PROGRESS NOTE  By Vicki Mccoy M.D. [facility]  Dr. Yasmine Pritchard M.D.,  Dr. Jordan Smith M.D.,   Dr. Ilana Dodd D.O.,  Dr. Mushtaq Harris M.D.,  Dr. Miguelina Hawkins D.O.,  Tiffanie Trujillo D.O. Teagan Wilson  67 y.o.  female    SUBJECTIVE:  Denies abdominal pain. Denies nausea vomiting. Patient sister at bedside    OBJECTIVE:    /65   Pulse 106   Temp 98.3 °F (36.8 °C) (Infrared)   Resp 23   Ht 4' 11\" (1.499 m)   Wt 118 lb 3.2 oz (53.6 kg)   SpO2 93%   BMI 23.87 kg/m²     General: NAD/elderly  female  HEENT: Anicteric sclera/moist oral mucosa  Neck: Supple/trachea is midline  Chest: CTA B/symmetrical excursion  Cor: Regular tachycardia  Abd.: Soft/NT. Ostomy with greenish liquid without blood or melena. PEG tube in place  Extr.:  Decreased muscle tone and bulk throughout  Skin: Warm and dry        DATA:    Monitor data reviewed -sinus rhythm/sinus tachycardia noted.        Lab Results   Component Value Date    WBC 12.5 03/11/2022    RBC 2.67 03/11/2022    HGB 8.3 03/11/2022    HCT 26.0 03/11/2022    MCV 97.8 03/11/2022    MCH 31.1 03/11/2022    MCHC 31.8 03/11/2022    RDW 15.9 03/11/2022     03/11/2022    MPV 11.3 03/11/2022     Lab Results   Component Value Date     03/10/2022    K 4.0 03/10/2022     03/10/2022    CO2 19 03/10/2022    BUN 11 03/10/2022    CREATININE 0.5 03/10/2022    CALCIUM 9.6 03/10/2022    PROT 6.0 03/10/2022    LABALBU 4.1 03/10/2022    BILITOT 0.7 03/10/2022    ALKPHOS 127 03/10/2022    AST 53 03/10/2022    ALT 43 03/10/2022     Lab Results   Component Value Date    LIPASE 29 06/23/2014     Lab Results   Component Value Date    AMYLASE 202 06/24/2014         ASSESSMENT/PLAN:  Patient Active Problem List   Diagnosis    Odontoid fracture ([address]    Hypothyroid    Multiple rib fractures    TMJ (temporomandibular joint syndrome)    Bipolar disorder (HCC)    Sciatica    Hiatal hernia    Abnormality of gait and mobility    Trigeminal neuralgia    Headache    Cervical neck pain with evidence of disc disease    Vomiting    Hypokalemia    Intra-abdominal infection    MRSA bacteremia    Paroxysmal atrial fibrillation (HCC)    Hypotension    Hypernatremia    Moderate protein-calorie malnutrition ([address]     1. Anemia/rectal bleed  -Stable H&H  -Likely related to ulceration from fecal impaction  -Disimpaction/treatment per general surgery 3/10  -No immediate plan for colonoscopy at this time  -Monitor H&H  -Consider endoscopy in case of precipitous drop in H&H/overt bleed     2. Elevated transaminase  -Nonobstructive liver profile  -Likely related to general medical condition/medication  -Monitor labs     3. PEG tube   -Infected PEG tube site/insertion of new PEG  -Per general surgery     No immediate plans for intervention from GI POV. GI will see as needed in the hospital -please, call with questions/concerns. Above has been discussed with patient and her sister at bedside at bedside and all questions answered to their satisfaction. They verbalized understanding and agreement with the plan as delineated. Follow-up in the office after discharge -call for appointment at [phone]. Suresh Hannah MD  3/11/2022  12:50 PM    NOTE:  This report was transcribed using voice recognition software. Every effort was made to ensure accuracy; however, inadvertent computerized transcription errors may be present.

## 2022-03-11 NOTE — ANESTHESIA PRE PROCEDURE
Department of Anesthesiology  Preprocedure Note       Name:  Rajinder Gonzalez   Age:  67 y.o.  :  1945                                          MRN:  [id number]         Date:  3/11/2022      Surgeon: Ok Floor):  Ketty Hernandez MD    Procedure: Procedure(s):  TRANSESOPHAGEAL ECHOCARDIOGRAM    Medications prior to admission:   Prior to Admission medications    Medication Sig Start Date End Date Taking? Authorizing Provider   vitamin D (CHOLECALCIFEROL) 25 MCG (1000 UT) TABS tablet Take 1,000 Units by mouth daily    Historical Provider, MD   paliperidone (INVEGA) 1.5 MG extended release tablet Take 1.5 mg by mouth every morning    Historical Provider, MD   famotidine (PEPCID) 40 MG tablet Take 40 mg by mouth daily    Historical Provider, MD   sertraline (ZOLOFT) 100 MG tablet Take 100 mg by mouth daily    Historical Provider, MD   buPROPion (WELLBUTRIN SR) 100 MG extended release tablet Take 100 mg by mouth 2 times daily    Historical Provider, MD   cyanocobalamin 1000 MCG/ML injection Inject 1,000 mcg into the muscle once    Historical Provider, MD   melatonin 5 MG TBDP disintegrating tablet Take 5 mg by mouth nightly    Historical Provider, MD   fluconazole (DIFLUCAN) 200 MG tablet Take 200 mg by mouth daily    Historical Provider, MD   levothyroxine (SYNTHROID) 50 MCG tablet Take 50 mcg by mouth Daily. Historical Provider, MD   acetaminophen (TYLENOL) 325 MG tablet Take 650 mg by mouth every 4 hours as needed for Pain or Fever. Historical Provider, MD       Current medications:    No current facility-administered medications for this visit. No current outpatient medications on file.      Facility-Administered Medications Ordered in Other Visits   Medication Dose Route Frequency Provider Last Rate Last Admin    pantoprazole (PROTONIX) injection 40 mg  40 mg IntraVENous BID Chandrika Liao MD   40 mg at 22 0938    0.9% NaCl with KCl 40 mEq infusion   IntraVENous Continuous Chandrika Liao MD 100 mL/hr at 03/11/22 0535 Rate Verify at 03/11/22 0535    miconazole (MICOTIN) 2 % powder   Topical BID Bao Lira MD   Given at 03/11/22 0937    Petrolatum 42 % OINT   Topical BID Bao Lira MD   Given at 03/11/22 9321    And    Petrolatum 42 % OINT   Topical TID PRN Bao Lira MD        polyethylene glycol-electrolytes (NULYTELY) solution 4,000 mL  4,000 mL Per G Tube Once Bao Lira MD        0.9 % sodium chloride infusion   IntraVENous PRN Bao Lira MD        piperacillin-tazobactam (ZOSYN) 3,375 mg in dextrose 5 % 50 mL IVPB extended infusion (mini-bag)  3,375 mg IntraVENous Q8H Bao Lira MD 12.5 mL/hr at 03/11/22 1247 3,375 mg at 03/11/22 1247    morphine (PF) injection 2 mg  2 mg IntraVENous Q4H PRN Bao Lira MD   2 mg at 03/11/22 0940    ondansetron (ZOFRAN) injection 4 mg  4 mg IntraVENous Q6H PRN Bao Lira MD        fluconazole (DIFLUCAN) in 0.9 % sodium chloride IVPB 200 mg  200 mg IntraVENous Q24H Bao Lira  mL/hr at 03/11/22 0930 200 mg at 03/11/22 0930    albuterol (PROVENTIL) nebulizer solution 2.5 mg  2.5 mg Nebulization Q6H PRN MD Earnest Guzman [facility] [place] by provider] heparin (porcine) injection 5,000 Units  5,000 Units SubCUTAneous 3 times per day Tanya Samson DO   5,000 Units at 03/09/22 1345       Allergies: Allergies   Allergen Reactions    Abilify [Aripiprazole]     Depakote [Divalproex Sodium]     Dye [Iodides] Hives     Pt. Is allergic to CT dye. Breaks out in hive.s  Pt. Is allergic to CT dye. Breaks out in hives. Pt. Gets pre-medicated with benadryl prior to CT.         Geodon [Ziprasidone Hcl]     Levsin [Hyoscyamine]     Lithium     Neurontin [Gabapentin]     Risperidone And Related     Tegretol [Carbamazepine]     Tramadol     Trileptal [Oxcarbazepine]     Zyprexa [Olanzapine]        Problem List:    Patient Active Problem List   Diagnosis Code    [facility] S12.467I  Hypothyroid E03.9    Multiple rib fractures S22.49XA    TMJ (temporomandibular joint syndrome) M26.609    Bipolar disorder (HCC) F31.9    Sciatica M54.30    Hiatal hernia K44.9    Abnormality of gait and mobility R26.9    Trigeminal neuralgia G50.0    Headache R51.9    Cervical neck pain with evidence of disc disease M50.90    Vomiting R11.10    Hypokalemia E87.6    Intra-abdominal infection B99.9    MRSA bacteremia R78.81, B95.62    Paroxysmal atrial fibrillation (HCC) I48.0    Hypotension I95.9    Hypernatremia E87.0    Moderate protein-calorie malnutrition (HCC) E44.0       Past Medical History:        Diagnosis Date    Allergic     Anxiety disorder     Arthritis     Difficulty walking     Dysphagia     Hyperlipidemia     Hypertension     Interstitial cystitis     Lack of coordination     Mitral valve prolapse     pt. has a mitral valve prolapse    Neuromuscular disorder (HCC)     Other disorders of kidney and ureter in diseases classified elsewhere     Pelvis fracture (HCC)     multiple fxs pelvis    Psychiatric problem     Thyroid disease     Trigeminal neuralgia     Unspecified dementia without behavioral disturbance ([facility]        Past Surgical History:        Procedure Laterality Date    FRACTURE SURGERY      GASTROSTOMY TUBE PLACEMENT N/A 3/3/2022    EGD PEG TUBE PLACEMENT performed by Jillian Salgado MD at [address] [facility] [address]     UPPER GASTROINTESTINAL ENDOSCOPY N/A 3/10/2022    EGD ESOPHAGOGASTRODUODENOSCOPY PEG TUBE INSERTION; EVACUATION OF STOOL IMPACTON performed by Lianne Son MD at [address] History:    Social History     Tobacco Use    Smoking status: Never Smoker    Smokeless tobacco: Not on file   Substance Use Topics    Alcohol use: No                                Counseling given: Not Answered      Vital Signs (Current): There were no vitals filed for this visit. BP Readings from Last 3 Encounters:   03/11/22 132/65   03/10/22 112/76   03/03/22 132/72       NPO Status:  greater than 8 hours                                                                               BMI:   Wt Readings from Last 3 Encounters:   03/11/22 118 lb 3.2 oz (53.6 kg)   03/03/22 104 lb (47.2 kg)     There is no height or weight on file to calculate BMI.    CBC:   Lab Results   Component Value Date    WBC 12.5 03/11/2022    RBC 2.67 03/11/2022    HGB 8.3 03/11/2022    HCT 26.0 03/11/2022    MCV 97.8 03/11/2022    RDW 15.9 03/11/2022     03/11/2022       CMP:   Lab Results   Component Value Date     03/10/2022    K 4.0 03/10/2022     03/10/2022    CO2 19 03/10/2022    BUN 11 03/10/2022    CREATININE 0.5 03/10/2022    GFRAA >60 03/10/2022    LABGLOM >60 03/10/2022    GLUCOSE 74 03/10/2022    PROT 6.0 03/10/2022    CALCIUM 9.6 03/10/2022    BILITOT 0.7 03/10/2022    ALKPHOS 127 03/10/2022    AST 53 03/10/2022    ALT 43 03/10/2022       POC Tests: No results for input(s): POCGLU, POCNA, POCK, POCCL, POCBUN, POCHEMO, POCHCT in the last 72 hours. Coags:   Lab Results   Component Value Date    PROTIME 15.8 03/09/2022    INR 1.4 03/09/2022       HCG (If Applicable): No results found for: PREGTESTUR, PREGSERUM, HCG, HCGQUANT     ABGs: No results found for: PHART, PO2ART, MPN2SFD, AWK0OEU, BEART, Z2OAOJVC     Type & Screen (If Applicable):  No results found for: LABABO, LABRH    Drug/Infectious Status (If Applicable):  No results found for: HIV, HEPCAB    COVID-19 Screening (If Applicable):   Lab Results   Component Value Date    COVID19 Not Detected 04/09/2021           Anesthesia Evaluation  Patient summary reviewed  Airway: Mallampati: IV  TM distance: <3 FB   Neck ROM: limited  Mouth opening: < 3 FB Dental:          Pulmonary: breath sounds clear to auscultation                            ROS comment: Allergy.    Cardiovascular:    (+) hypertension:, valvular problems/murmurs: MVP, hyperlipidemia      ECG reviewed  Rhythm: regular  Rate: normal           Beta Blocker:  Not on Beta Blocker      ROS comment: EKG: Sinus Rhythm 83, possible Anterior MI, Inferior/ Lateral St & T changes. Bacteremia      Neuro/Psych:   (+) neuromuscular disease:, headaches:, psychiatric history:             ROS comment: Difficult Walking. Lack of Coordination. S/P Fracture Pelvis. GI/Hepatic/Renal:   (+) hiatal hernia,          ROS comment: Dysphagia. Interstitial Cystitis. .   Endo/Other:    (+) hypothyroidism::., .                 Abdominal:             Vascular: negative vascular ROS. Other Findings:             Anesthesia Plan      MAC     ASA 4       Induction: intravenous. MIPS: Prophylactic antiemetics administered. Anesthetic plan and risks discussed with patient. Plan discussed with CRNA.             [address],    3/11/2022

## 2022-03-11 NOTE — PROGRESS NOTES
Pharmacy Consultation Note  (Antibiotic Dosing and Monitoring)    Initial consult date: 3/9/22  Consulting physician/provider: Dr. Micah Mckinley  Drug: Vancomycin  Indication: MRSA bacteremia    Age/  Gender Height Weight IBW  Allergy Information   76 y.o./female 4' 11\" (149.9 cm) 115 lb (52.2 kg)     Patient must be at least 60 in tall to calculate ideal body weight   Abilify [aripiprazole], Depakote [divalproex sodium], Dye [iodides], Geodon [ziprasidone hcl], Levsin [hyoscyamine], Lithium, Neurontin [gabapentin], Risperidone and related, Tegretol [carbamazepine], Tramadol, Trileptal [oxcarbazepine], and Zyprexa [olanzapine]      Renal Function:  Recent Labs     03/09/22  0632 03/10/22  0834   BUN 14 11   CREATININE 0.4* 0.5       Intake/Output Summary (Last 24 hours) at 3/11/2022 1305  Last data filed at 3/11/2022 0837  Gross per 24 hour   Intake 2842.19 ml   Output 1400 ml   Net 1442.19 ml       Vancomycin Monitoring:  Trough:    Recent Labs     03/10/22  1720   VANCOTROUGH 13.2     Random:  No results for input(s): VANCORANDOM in the last 72 hours. Vancomycin Administration Times:  Recent vancomycin administrations                   vancomycin (VANCOCIN) 1,000 mg in dextrose 5 % 250 mL IVPB (mg) 1,000 mg New Bag 03/11/22 0331     1,000 mg New Bag 03/10/22 1840     1,000 mg New Bag  0224     1,000 mg New Bag 03/09/22 1636                  Assessment:  · Patient is a 67 y.o. female who has been initiated on vancomycin for MRSA bacteremia per chart review. The positive cultures are from an outside facility that were reported, so has not been treated. Repeat blood cultures have been collected here and show NGTD.   · CrCl ~40-50 mL/min  · To dose vancomycin, pharmacy will be utilizing [employer] calculation software for goal AUC/CLIFF 400-600 mg/L-hr   · 3/11: Level collected yesterday @ 4513 (instead of today @ 1430) = 13.2 mcg/mL, AUC/CLIFF 717    Plan:  · Adjust dose to Vancomycin 1,250 IV every 24 hours  · Repeat level on new regimen  · Will continue to monitor renal function   · Clinical pharmacy will continue to follow      Jayme Barnett PharmD, BCPS 3/11/2022 1:05 PM   [phone]

## 2022-03-11 NOTE — ACP (ADVANCE CARE PLANNING)
Advance Care Planning   Healthcare Decision Maker:    Primary Decision Maker: Lu Dia - [phone]    Click here to complete Healthcare Decision Makers including selection of the Healthcare Decision Maker Relationship (ie \"Primary\"). Today we documented Decision Maker(s). The patient will provide ACP documents. Son states he will bring in copy of POA for Healthcare.

## 2022-03-11 NOTE — ANESTHESIA POSTPROCEDURE EVALUATION
Department of Anesthesiology  Postprocedure Note    Patient: Francis Gomez  MRN: [id number]  YOB: 1945  Date of evaluation: 3/11/2022  Time:  2:42 PM     Procedure Summary     Date: 03/11/22 Room / Location: [facility] ENDO 01 / [address]    Anesthesia Start: 1823 Anesthesia Stop: 5314    Procedure: TRANSESOPHAGEAL ECHOCARDIOGRAM (N/A ) Diagnosis: (ENDOCARDITIS)    Surgeons: Rosa Isela Crow MD Responsible Provider: Ivanna Ordonez DO    Anesthesia Type: MAC ASA Status: 4          Anesthesia Type: MAC    River Phase I: River Score: 9    River Phase II: River Score: 9    Last vitals: Reviewed and per EMR flowsheets.        Anesthesia Post Evaluation    Patient location during evaluation: PACU  Patient participation: complete - patient participated  Level of consciousness: awake and alert  Airway patency: patent  Nausea & Vomiting: no nausea and no vomiting  Complications: no  Cardiovascular status: hemodynamically stable  Respiratory status: acceptable  Hydration status: euvolemic

## 2022-03-11 NOTE — CONSULTS
Nutrition Note    Consult received for TF O&M, note RD recommendations 3/11 meets estimated nutrient needs. Standard w/ Fiber (Jevity 1.5) @ 35ml/hr + 1 protein modular + 625hUg0 to provide:   840mL TV, 1364kcal, 80gm pro, 638ml water, 1358mL total free water  Regime meets 100% estimated nutrient needs. See RD progress note dated 3/10 for full assessment. Will follow up as planned.    Electronically signed by Iggy Robertson MS, RD, LD on 3/11/22 at 10:23 AM EST    Contact: [phone]

## 2022-03-11 NOTE — PROGRESS NOTES
[facility]  [facility]  Progress Note    NAME: Darryl Antonio  MR:  [id number]  :   1945  DATE OF SERVICE:22    This is a face to face encounter with Darryl Antonio 67 y.o. female on 22    CHIEF COMPLAINT     ID following for No chief complaint on file. HISTORY OF PRESENT ILLNESS   Pt seen and examined  22   More awake and alert has no c/o   Afebrile 3L  3/10/2022  Seen postop EGD ESOPHAGOGASTRODUODENOSCOPY PEG TUBE INSERTION, exam under anesthesia, manual fecal disimpaction, control perianal hemorrhage, rectal packing     nad  Has pain     Patient is tolerating medications. No reported adverse drug reactions. REVIEW OF SYSTEMS     As stated above in the chief complaint, otherwise negative. CURRENT MEDICATIONS      pantoprazole  40 mg IntraVENous BID    vancomycin  1,000 mg IntraVENous Q12H    miconazole   Topical BID    Petrolatum   Topical BID    polyethylene glycol-electrolytes  4,000 mL Per G Tube Once    piperacillin-tazobactam  3,375 mg IntraVENous Q8H    fluconazole  200 mg IntraVENous Q24H    [Held by provider] heparin (porcine)  5,000 Units SubCUTAneous 3 times per day     Continuous Infusions:   0.9% NaCl with KCl 40 mEq 100 mL/hr at 22 0535    sodium chloride       PRN Meds:Petrolatum **AND** Petrolatum, sodium chloride, morphine, ondansetron, albuterol    PHYSICAL EXAM     /65   Pulse 106   Temp 98.3 °F (36.8 °C) (Infrared)   Resp 23   Ht 4' 11\" (1.499 m)   Wt 118 lb 3.2 oz (53.6 kg)   SpO2 93%   BMI 23.87 kg/m²   Temp  Av.8 °F (36.6 °C)  Min: 96.4 °F (35.8 °C)  Max: 98.7 °F (37.1 °C)  Constitutional:  The patient is awake, alert  pale  Skin:    Warm and dry. HEENT:     AT/NC  Chest:   No use of accessory muscles to breathe. Symmetrical expansion. Cardiovascular:  S1 and S2 are rhythmic and regular. No murmurs appreciated. Abdomen:   Positive bowel sounds to auscultation.  tender peg binder  Extremities:   No clubbing, no cyanosis,   edema. CNS    AAxO   Lines: jan durbin      DIAGNOSTIC RESULTS   Radiology:    Recent Labs     03/09/22  0626 03/09/22  1454 03/10/22  0834 03/10/22  0834 03/10/22  1438 03/10/22  2036 03/11/22  0332   WBC 12.2*  --  7.6  --   --   --   --    RBC 2.97*  --  2.25*  --   --   --   --    HGB 9.5*   < > 7.1*   < > 7.6* 7.8* 8.3*   HCT 30.6*   < > 21.9*   < > 24.4* 24.6* 26.6*   .0*  --  97.3  --   --   --   --    MCH 32.0  --  31.6  --   --   --   --    MCHC 31.0*  --  32.4  --   --   --   --    RDW 14.3  --  15.9*  --   --   --   --      --  183  --   --   --   --    MPV 10.5  --  10.6  --   --   --   --     < > = values in this interval not displayed. Recent Labs     03/09/22  0632 03/10/22  0834   * 149*   K 3.7 4.0   * 117*   CO2 25 19*   BUN 14 11   CREATININE 0.4* 0.5   GLUCOSE 81 74   PROT 5.7* 6.0*   LABALBU 2.1* 4.1   CALCIUM 8.7 9.6   BILITOT 0.4 0.7   ALKPHOS 165* 127*   AST 93* 53*   * 43*     Lab Results   Component Value Date    CRP 31.9 (H) 03/08/2022     Lab Results   Component Value Date    SEDRATE 109 (H) 03/08/2022     Recent Labs     03/08/22  2240 03/09/22  0632 03/09/22  1358 03/09/22  1454 03/10/22  0324 03/10/22  0834   CRP 31.9*  --   --   --   --   --    PROCAL  --   --  0.24*  --   --   --    FERRITIN  --   --   --   --  720  --    INR  --   --   --  1.4  --   --    PROTIME  --   --   --  15.8*  --   --    AST  --  93*  --   --   --  53*   ALT  --  101*  --   --   --  43*       Microbiology:   No results for input(s): COVID19 in the last 72 hours.   Lab Results   Component Value Date    BC 24 Hours no growth 03/08/2022    BLOODCULT2 24 Hours no growth 03/08/2022    ORG Klebsiella oxytoca 07/01/2014     [facility]        urine 2/16/2022          FINAL IMPRESSION     Admitted for Sepsis   On treatment for MRSA sepsis/uti   Peg infection s/p reinsertion new site  Gib/fecal impaction leukocytosis reactive        vancomycin (VANCOCIN) 1,000 mg in dextrose 5 % 250 mL IVPB, Q12H  piperacillin-tazobactam (ZOSYN) 3,375 mg in dextrose 5 % 50 mL IVPB extended infusion (mini-bag), Q8H  fluconazole (DIFLUCAN) in 0.9 % sodium chloride IVPB 200 mg, Q24H     Cont atbx  Await final cx  · Monitor labs    Imaging and labs were reviewed per medical records. Thank you for involving me in the care of Jessica Halsted will continue to follow. Please do not hesitate to call for any questions or concerns.     Electronically signed by Matty Feldman MD on 3/11/2022 at 8:25 AM

## 2022-03-11 NOTE — CARE COORDINATION
SOCIAL WORK / DISCHARGE PLANNING:  WILL NEED RAPID COVID TEST PRIOR TO DC. Sw spoke with pt's son, Levert Boxer regarding discharge planning. Sw discussed LTAC level of care and possible criteria. He is very interested, reviewed choices, agreeable with [facility]. They are following, PRECERT would be needed prior to dc. Pt is a bedhold at [facility], can accept back. PRECERT can be attempted but do not need to wait for it be obtained. PEG placed, Tube feed started today. Son is concerned about pt not receiving her psych meds. KADEN today.                Electronically signed by NIRANJAN Tillman on 3/11/2022 at 2:42 PM

## 2022-03-11 NOTE — PROGRESS NOTES
GENERAL SURGERY  DAILY PROGRESS NOTE  3/11/2022    No chief complaint on file. Subjective:  Peg tube placed yesterday. Doing well. No bloody BM overnight. Ostomy over previous ostomy site     Objective:  /73   Pulse 106   Temp 98.3 °F (36.8 °C) (Axillary)   Resp 25   Ht 4' 11\" (1.499 m)   Wt 118 lb 3.2 oz (53.6 kg)   SpO2 94%   BMI 23.87 kg/m²     GENERAL:  Laying in bed, awake, alert, cooperative, no apparent distress  LUNGS:  No increased work of breathing  CARDIOVASCULAR:  RR  ABDOMEN:  Soft, appropriately tender, non-distended, peg tube at 2.5 cm from sick. Ostomy over previous site   EXTREMITIES: No edema or swelling  SKIN: Warm and dry    Assessment/Plan:  67 y.o. female dysphagia with PEG tube dislodgement     PEG tube site looks clean   Okay to start tube feeds tomorrow  Ostomy over previous site.  Continue until there is a decrease in drainage   No bloody BM overnight-- work up per GI   No other acute surgical intervention     Electronically signed by Rossi Velásquez DO on 3/11/2022 at 7:37 AM     As above  Wound continues to drain into the urostomy bag around old PEG site  I had a discussion at bedside with family regarding grim prognosis of her severe weakness and deconditioning  Continue her ostomy bag until output decreases then will start packing the wound  Tube feeds as tolerated  Appreciate GI input regarding rectal bleeding over the seems to have stopped after exam under anesthesia yesterday  Monitor hemoglobin  Continue antibiotics for PEG tube site infection per infectious disease

## 2022-03-12 ENCOUNTER — APPOINTMENT (OUTPATIENT)
Dept: GENERAL RADIOLOGY | Age: 77
DRG: 862 | End: 2022-03-12
Attending: INTERNAL MEDICINE
Payer: COMMERCIAL

## 2022-03-12 LAB
ALBUMIN SERPL-MCNC: 3.5 G/DL (ref 3.5–5.2)
ALP BLD-CCNC: 129 U/L (ref 35–104)
ALT SERPL-CCNC: 31 U/L (ref 0–32)
ANION GAP SERPL CALCULATED.3IONS-SCNC: 11 MMOL/L (ref 7–16)
AST SERPL-CCNC: 25 U/L (ref 0–31)
BASOPHILS ABSOLUTE: 0 E9/L (ref 0–0.2)
BASOPHILS RELATIVE PERCENT: 0 % (ref 0–2)
BILIRUB SERPL-MCNC: 1 MG/DL (ref 0–1.2)
BUN BLDV-MCNC: 5 MG/DL (ref 6–23)
CALCIUM SERPL-MCNC: 9.8 MG/DL (ref 8.6–10.2)
CHLORIDE BLD-SCNC: 99 MMOL/L (ref 98–107)
CO2: 24 MMOL/L (ref 22–29)
CREAT SERPL-MCNC: 0.4 MG/DL (ref 0.5–1)
EOSINOPHILS ABSOLUTE: 1.25 E9/L (ref 0.05–0.5)
EOSINOPHILS RELATIVE PERCENT: 7 % (ref 0–6)
GFR AFRICAN AMERICAN: >60
GFR NON-AFRICAN AMERICAN: >60 ML/MIN/1.73
GLUCOSE BLD-MCNC: 126 MG/DL (ref 74–99)
HCT VFR BLD CALC: 29.2 % (ref 34–48)
HCT VFR BLD CALC: 30.3 % (ref 34–48)
HCT VFR BLD CALC: 30.4 % (ref 34–48)
HCT VFR BLD CALC: 30.7 % (ref 34–48)
HEMOGLOBIN: 10 G/DL (ref 11.5–15.5)
HEMOGLOBIN: 9.4 G/DL (ref 11.5–15.5)
HEMOGLOBIN: 9.9 G/DL (ref 11.5–15.5)
HEMOGLOBIN: 9.9 G/DL (ref 11.5–15.5)
LYMPHOCYTES ABSOLUTE: 2.31 E9/L (ref 1.5–4)
LYMPHOCYTES RELATIVE PERCENT: 13 % (ref 20–42)
MCH RBC QN AUTO: 31 PG (ref 26–35)
MCHC RBC AUTO-ENTMCNC: 32.7 % (ref 32–34.5)
MCV RBC AUTO: 95 FL (ref 80–99.9)
METAMYELOCYTES RELATIVE PERCENT: 2 % (ref 0–1)
MONOCYTES ABSOLUTE: 0.36 E9/L (ref 0.1–0.95)
MONOCYTES RELATIVE PERCENT: 2 % (ref 2–12)
MYELOCYTE PERCENT: 2 % (ref 0–0)
NEUTROPHILS ABSOLUTE: 13.88 E9/L (ref 1.8–7.3)
NEUTROPHILS RELATIVE PERCENT: 74 % (ref 43–80)
ORGANISM: ABNORMAL
PDW BLD-RTO: 14.6 FL (ref 11.5–15)
PLATELET # BLD: 268 E9/L (ref 130–450)
PMV BLD AUTO: 10.2 FL (ref 7–12)
POLYCHROMASIA: ABNORMAL
POTASSIUM SERPL-SCNC: 4.4 MMOL/L (ref 3.5–5)
RBC # BLD: 3.19 E12/L (ref 3.5–5.5)
SARS-COV-2, NAAT: NOT DETECTED
SODIUM BLD-SCNC: 134 MMOL/L (ref 132–146)
TOTAL PROTEIN: 6.3 G/DL (ref 6.4–8.3)
WBC # BLD: 17.8 E9/L (ref 4.5–11.5)
WOUND/ABSCESS: ABNORMAL
WOUND/ABSCESS: ABNORMAL

## 2022-03-12 PROCEDURE — 2580000003 HC RX 258: Performed by: SURGERY

## 2022-03-12 PROCEDURE — 87635 SARS-COV-2 COVID-19 AMP PRB: CPT

## 2022-03-12 PROCEDURE — C9113 INJ PANTOPRAZOLE SODIUM, VIA: HCPCS | Performed by: SURGERY

## 2022-03-12 PROCEDURE — 2580000003 HC RX 258: Performed by: INTERNAL MEDICINE

## 2022-03-12 PROCEDURE — 6360000002 HC RX W HCPCS: Performed by: SURGERY

## 2022-03-12 PROCEDURE — 6370000000 HC RX 637 (ALT 250 FOR IP): Performed by: INTERNAL MEDICINE

## 2022-03-12 PROCEDURE — 87040 BLOOD CULTURE FOR BACTERIA: CPT

## 2022-03-12 PROCEDURE — 2060000000 HC ICU INTERMEDIATE R&B

## 2022-03-12 PROCEDURE — 85025 COMPLETE CBC W/AUTO DIFF WBC: CPT

## 2022-03-12 PROCEDURE — 85018 HEMOGLOBIN: CPT

## 2022-03-12 PROCEDURE — 6370000000 HC RX 637 (ALT 250 FOR IP): Performed by: SURGERY

## 2022-03-12 PROCEDURE — 71045 X-RAY EXAM CHEST 1 VIEW: CPT

## 2022-03-12 PROCEDURE — 6360000002 HC RX W HCPCS: Performed by: INTERNAL MEDICINE

## 2022-03-12 PROCEDURE — 36415 COLL VENOUS BLD VENIPUNCTURE: CPT

## 2022-03-12 PROCEDURE — 2700000000 HC OXYGEN THERAPY PER DAY

## 2022-03-12 PROCEDURE — 6370000000 HC RX 637 (ALT 250 FOR IP): Performed by: NURSE PRACTITIONER

## 2022-03-12 PROCEDURE — 80053 COMPREHEN METABOLIC PANEL: CPT

## 2022-03-12 PROCEDURE — 85014 HEMATOCRIT: CPT

## 2022-03-12 RX ORDER — LEVOTHYROXINE SODIUM 0.05 MG/1
50 TABLET ORAL DAILY
Status: DISCONTINUED | OUTPATIENT
Start: 2022-03-12 | End: 2022-03-22 | Stop reason: HOSPADM

## 2022-03-12 RX ORDER — BUPROPION HYDROCHLORIDE 100 MG/1
50 TABLET ORAL EVERY 6 HOURS
Status: DISCONTINUED | OUTPATIENT
Start: 2022-03-12 | End: 2022-03-22 | Stop reason: HOSPADM

## 2022-03-12 RX ORDER — BUPROPION HYDROCHLORIDE 100 MG/1
100 TABLET, EXTENDED RELEASE ORAL 2 TIMES DAILY
Status: DISCONTINUED | OUTPATIENT
Start: 2022-03-12 | End: 2022-03-12

## 2022-03-12 RX ADMIN — MORPHINE SULFATE 2 MG: 2 INJECTION, SOLUTION INTRAMUSCULAR; INTRAVENOUS at 16:07

## 2022-03-12 RX ADMIN — ANTI-FUNGAL POWDER MICONAZOLE NITRATE TALC FREE: 1.42 POWDER TOPICAL at 09:17

## 2022-03-12 RX ADMIN — PIPERACILLIN SODIUM AND TAZOBACTAM SODIUM 3375 MG: 3; 375 INJECTION, POWDER, FOR SOLUTION INTRAVENOUS at 13:00

## 2022-03-12 RX ADMIN — LEVOTHYROXINE SODIUM 50 MCG: 0.05 TABLET ORAL at 14:13

## 2022-03-12 RX ADMIN — PANTOPRAZOLE SODIUM 40 MG: 40 INJECTION, POWDER, FOR SOLUTION INTRAVENOUS at 09:18

## 2022-03-12 RX ADMIN — VANCOMYCIN HYDROCHLORIDE 1250 MG: 10 INJECTION, POWDER, LYOPHILIZED, FOR SOLUTION INTRAVENOUS at 05:28

## 2022-03-12 RX ADMIN — BUPROPION HYDROCHLORIDE 50 MG: 100 TABLET, FILM COATED ORAL at 15:14

## 2022-03-12 RX ADMIN — QUETIAPINE FUMARATE 25 MG: 25 TABLET ORAL at 20:49

## 2022-03-12 RX ADMIN — FLUCONAZOLE IN SODIUM CHLORIDE 200 MG: 2 INJECTION, SOLUTION INTRAVENOUS at 09:17

## 2022-03-12 RX ADMIN — PIPERACILLIN SODIUM AND TAZOBACTAM SODIUM 3375 MG: 3; 375 INJECTION, POWDER, FOR SOLUTION INTRAVENOUS at 05:32

## 2022-03-12 RX ADMIN — PANTOPRAZOLE SODIUM 40 MG: 40 INJECTION, POWDER, FOR SOLUTION INTRAVENOUS at 20:48

## 2022-03-12 RX ADMIN — ANTI-FUNGAL POWDER MICONAZOLE NITRATE TALC FREE: 1.42 POWDER TOPICAL at 20:52

## 2022-03-12 RX ADMIN — HEPARIN SODIUM 5000 UNITS: 5000 INJECTION INTRAVENOUS; SUBCUTANEOUS at 14:30

## 2022-03-12 RX ADMIN — ACETAMINOPHEN 650 MG: 325 TABLET ORAL at 05:37

## 2022-03-12 RX ADMIN — HEPARIN SODIUM 5000 UNITS: 5000 INJECTION INTRAVENOUS; SUBCUTANEOUS at 20:48

## 2022-03-12 RX ADMIN — PIPERACILLIN SODIUM AND TAZOBACTAM SODIUM 3375 MG: 3; 375 INJECTION, POWDER, FOR SOLUTION INTRAVENOUS at 21:08

## 2022-03-12 RX ADMIN — PETROLATUM: 420 OINTMENT TOPICAL at 09:17

## 2022-03-12 RX ADMIN — MORPHINE SULFATE 2 MG: 2 INJECTION, SOLUTION INTRAMUSCULAR; INTRAVENOUS at 02:10

## 2022-03-12 RX ADMIN — BUPROPION HYDROCHLORIDE 50 MG: 100 TABLET, FILM COATED ORAL at 20:49

## 2022-03-12 RX ADMIN — ACETAMINOPHEN 650 MG: 325 TABLET ORAL at 16:08

## 2022-03-12 RX ADMIN — SERTRALINE 100 MG: 50 TABLET, FILM COATED ORAL at 14:13

## 2022-03-12 RX ADMIN — PETROLATUM: 420 OINTMENT TOPICAL at 22:44

## 2022-03-12 ASSESSMENT — PAIN SCALES - GENERAL
PAINLEVEL_OUTOF10: 5
PAINLEVEL_OUTOF10: 0
PAINLEVEL_OUTOF10: 6
PAINLEVEL_OUTOF10: 0
PAINLEVEL_OUTOF10: 6
PAINLEVEL_OUTOF10: 0

## 2022-03-12 ASSESSMENT — PAIN SCALES - PAIN ASSESSMENT IN ADVANCED DEMENTIA (PAINAD)
NEGVOCALIZATION: 0
BREATHING: 0
TOTALSCORE: 6
TOTALSCORE: 0
BODYLANGUAGE: 2
CONSOLABILITY: 1
NEGVOCALIZATION: 0
TOTALSCORE: 6
FACIALEXPRESSION: 0
BODYLANGUAGE: 2
FACIALEXPRESSION: 2
CONSOLABILITY: 1
CONSOLABILITY: 0
NEGVOCALIZATION: 0
BREATHING: 1
BREATHING: 1
BODYLANGUAGE: 0
FACIALEXPRESSION: 2

## 2022-03-12 NOTE — PROGRESS NOTES
Pts tube feed rate increased to 20ml/hr at this time. Pt tolerating well. Will adjust again in 6 hours per order.

## 2022-03-12 NOTE — PROGRESS NOTES
[facility]  [facility]  Progress Note    NAME: Isabel Horton  MR:  [id number]  :   1945  DATE OF SERVICE:22    This is a face to face encounter with Isabel Horton 67 y.o. female on 22    CHIEF COMPLAINT     ID following for No chief complaint on file. HISTORY OF PRESENT ILLNESS   Pt seen and examined  22   PER STAFF PT HAD A FEVER [id number] 4L  HER TF IS GOING THROUGH HER STOMA BAG   SHE HAS NO C/O F/C/N/V/D    3/11/2022  More awake and alert has no c/o   Afebrile 3L  3/10/2022  Seen postop EGD ESOPHAGOGASTRODUODENOSCOPY PEG TUBE INSERTION, exam under anesthesia, manual fecal disimpaction, control perianal hemorrhage, rectal packing     nad  Has pain     Patient is tolerating medications. No reported adverse drug reactions. REVIEW OF SYSTEMS     As stated above in the chief complaint, otherwise negative. CURRENT MEDICATIONS      vancomycin  1,250 mg IntraVENous Q24H    QUEtiapine  25 mg Oral Nightly    pantoprazole  40 mg IntraVENous BID    miconazole   Topical BID    Petrolatum   Topical BID    polyethylene glycol-electrolytes  4,000 mL Per G Tube Once    piperacillin-tazobactam  3,375 mg IntraVENous Q8H    fluconazole  200 mg IntraVENous Q24H    [Held by provider] heparin (porcine)  5,000 Units SubCUTAneous 3 times per day     Continuous Infusions:   0.9% NaCl with KCl 40 mEq 100 mL/hr at 22 0716    sodium chloride       PRN Meds:acetaminophen, Petrolatum **AND** Petrolatum, sodium chloride, morphine, ondansetron, albuterol    PHYSICAL EXAM     /80   Pulse 104   Temp 99.9 °F (37.7 °C) (Axillary)   Resp 20   Ht 4' 11\" (1.499 m)   Wt 118 lb 14.4 oz (53.9 kg)   SpO2 95%   BMI 24.01 kg/m²   Temp  Av.8 °F (37.1 °C)  Min: 96.3 °F (35.7 °C)  Max: 100.8 °F (38.2 °C)  Constitutional:  The patient is awake, alert  pale  Skin:    Warm and dry. HEENT:     AT/NC  Chest:   No use of accessory muscles to breathe.  Symmetrical expansion. Cardiovascular:  S1 and S2 are rhythmic and regular. No murmurs appreciated. Abdomen:   Positive bowel sounds to auscultation. tender peg binder  Extremities:   No clubbing, no cyanosis,   edema. CNS    AA NAD RESPONSIVE  Lines: piv  Carrera yellow CLEAR      DIAGNOSTIC RESULTS   Radiology:    Recent Labs     03/10/22  0834 03/10/22  1438 03/11/22  0736 03/11/22  0856 03/11/22  2031 03/12/22  0200 03/12/22  0817   WBC 7.6  --  12.5*  --   --   --  17.8*   RBC 2.25*  --  2.67*  --   --   --  3.19*   HGB 7.1*   < > 8.3*   < > 9.1* 9.4* 9.9*   HCT 21.9*   < > 26.1*   < > 28.0* 29.2* 30.3*   MCV 97.3  --  97.8  --   --   --  95.0   MCH 31.6  --  31.1  --   --   --  31.0   MCHC 32.4  --  31.8*  --   --   --  32.7   RDW 15.9*  --  15.9*  --   --   --  14.6     --  217  --   --   --  268   MPV 10.6  --  11.3  --   --   --  10.2    < > = values in this interval not displayed.      Recent Labs     03/10/22  0834 03/11/22  1548 03/12/22  0817   * 142 134   K 4.0 4.6 4.4   * 108* 99   CO2 19* 19* 24   BUN 11 6 5*   CREATININE 0.5 0.4* 0.4*   GLUCOSE 74 77 126*   PROT 6.0* 5.9* 6.3*   LABALBU 4.1 3.4* 3.5   CALCIUM 9.6 9.4 9.8   BILITOT 0.7 0.7 1.0   ALKPHOS 127* 125* 129*   AST 53* 27 25   ALT 43* 34* 31     Lab Results   Component Value Date    CRP 31.9 (H) 03/08/2022     Lab Results   Component Value Date    SEDRATE 109 (H) 03/08/2022     Recent Labs     03/09/22  1358 03/09/22  1454 03/10/22  0324 03/10/22  0834 03/11/22  1548 03/12/22  0817   PROCAL 0.24*  --   --   --   --   --    FERRITIN  --   --  720  --   --   --    INR  --  1.4  --   --   --   --    PROTIME  --  15.8*  --   --   --   --    AST  --   --   --  53* 27 25   ALT  --   --   --  43* 34* 31       Microbiology:   Recent Labs     03/12/22  0958   COVID19 Not Detected     Lab Results   Component Value Date    BC 24 Hours no growth 03/08/2022    BLOODCULT2 24 Hours no growth 03/08/2022    ORG Staphylococcus aureus 03/08/2022 ORG Klebsiella oxytoca 07/01/2014     Galindo Circe 852        urine 2/16/2022          FINAL IMPRESSION     Admitted for Sepsis   On treatment for MRSA sepsis/uti   Peg infection s/p reinsertion new site  Gib/fecal impaction   leukocytosis reactive   FEVERS CHECK COVID BLOOD CX CXRY        vancomycin (VANCOCIN) 1,000 mg in dextrose 5 % 250 mL IVPB, Q12H  piperacillin-tazobactam (ZOSYN) 3,375 mg in dextrose 5 % 50 mL IVPB extended infusion (mini-bag), Q8H  fluconazole (DIFLUCAN) in 0.9 % sodium chloride IVPB 200 mg, Q24H     Cont atbx  CONT WOUND CARE    · Monitor labs    Imaging and labs were reviewed per medical records. Thank you for involving me in the care of Luz Leonel will continue to follow. Please do not hesitate to call for any questions or concerns.     Electronically signed by Orlando Corcoran MD on 3/12/2022 at 11:27 AM

## 2022-03-12 NOTE — PROGRESS NOTES
Pharmacy Consultation Note  (Antibiotic Dosing and Monitoring)    Initial consult date: 3/9/22  Consulting physician/provider: Dr. Carolyn Casey  Drug: Vancomycin  Indication: MRSA bacteremia    Age/  Gender Height Weight IBW  Allergy Information   76 y.o./female 4' 11\" (149.9 cm) 115 lb (52.2 kg)     Patient must be at least 60 in tall to calculate ideal body weight   Abilify [aripiprazole], Depakote [divalproex sodium], Dye [iodides], Geodon [ziprasidone hcl], Levsin [hyoscyamine], Lithium, Neurontin [gabapentin], Risperidone and related, Tegretol [carbamazepine], Tramadol, Trileptal [oxcarbazepine], and Zyprexa [olanzapine]      Renal Function:  Recent Labs     03/10/22  0834 03/11/22  1548 03/12/22  0817   BUN 11 6 5*   CREATININE 0.5 0.4* 0.4*       Intake/Output Summary (Last 24 hours) at 3/12/2022 1215  Last data filed at 3/12/2022 0926  Gross per 24 hour   Intake 3242.19 ml   Output 3850 ml   Net -607.81 ml       Vancomycin Monitoring:  Trough:    Recent Labs     03/10/22  1720   VANCOTROUGH 13.2     Random:  No results for input(s): VANCORANDOM in the last 72 hours. Vancomycin Administration Times:  Recent vancomycin administrations                   vancomycin (VANCOCIN) 1,250 mg in dextrose 5 % 250 mL IVPB (mg) 1,250 mg New Bag 03/12/22 0528    vancomycin (VANCOCIN) 1,000 mg in dextrose 5 % 250 mL IVPB (mg) 1,000 mg New Bag 03/11/22 0331     1,000 mg New Bag 03/10/22 1840     1,000 mg New Bag  0224     1,000 mg New Bag 03/09/22 1636                  Assessment:  · Patient is a 67 y.o. female who has been initiated on vancomycin for MRSA bacteremia per chart review. The positive cultures are from an outside facility that were reported, so has not been treated. Repeat blood cultures have been collected here and show NGTD.   · CrCl ~40-50 mL/min  · To dose vancomycin, pharmacy will be utilizing [facility] calculation software for goal AUC/CLIFF 400-600 mg/L-hr   · 3/11: Level collected yesterday @ 7080 (instead of today @ 1430) = 13.2 mcg/mL, AUC/CLIFF 717    Plan:  · Continue Vancomycin 1,250 IV every 24 hours  · Repeat level on new regimen  · Will continue to monitor renal function   · Clinical pharmacy will continue to follow      Ella Barajas PharmD, [facility] 3/12/2022 12:15 PM   [phone]

## 2022-03-12 NOTE — PROGRESS NOTES
Pts temp currently 100.8 axillary. Dr. Annia Busby notified and new orders received.  See [facility]

## 2022-03-12 NOTE — PROCEDURES
Procedure: Transesophageal echocardiogram    Indication: MRSA bacteremia    Anesthesia: MAC      KADEN procedure:    Written informed consent was obtained, the KADEN was performed under stable fasting condition. The patient was set up for monitoring of surface EKG and pulse oximetry continuously. Blood pressure was monitored and with automatic cuff measurements. Supplemental oxygen was administered. The procedure was performed under anesthesia by anesthesiology. After ensuring adequate anesthesia, KADEN probe was intubated without difficulty. Estimated total blood loss: 0    Complications: None    Patient was monitored until awake and vitals remained stable.       Findings:     No vegetations seen      Thank you

## 2022-03-12 NOTE — CARE COORDINATION
SS NOTE: DUANE recd the SS Consult:\". Federico Escobar Plan for LTAC 3/14. Federico Escobar \" Per DUANE note below [facility] will need [facility] prior to transfer. DUANE advised [facility] of the need to begin [address] [facility] for dch on 1/58. SS to continue. NIRANJAN Bertrand.3/12/2022.12:59 PM.

## 2022-03-12 NOTE — PROGRESS NOTES
Internal Medicine Progress Note    [facility]    Kenney Rivasnn. Martina Garner, STEVOOEARNEST Kebede D.O., LUCHO Michel, MSN, APRN, NP-C  Raza Francis. Heather Weinstein, MSN, APRN-CNP     Primary Care Physician: Piyush Zavala MD   Admitting Physician:  Belle Leong DO  Admission date and time: 3/8/2022  6:25 PM    Room:  [address] [place] [address]  Admitting diagnosis: Sepsis, unspecified organism [A41.9]    Patient Name: Betsy Moss  MRN: [id number]    Date of Service: 3/12/2022     Subjective:  Katelyn Rosado is a 67 y.o. female who was seen and examined today,3/12/2022, at the bedside. Katelyn Rosado appears relatively comfortable during the examination today. She remains profoundly weak and deconditioned. No family members were present during my examination. Tube feeds have been started and she seems to be tolerating these without complication. Review of System:   Constitutional:   Admits to generalized malaise and fatigue. HEENT:   Denies ear pain, sore throat, sinus or eye problems. Cardiovascular:   Denies any chest pain, irregular heartbeats, or palpitations. Respiratory:   Denies shortness of breath, coughing, sputum production, hemoptysis, or wheezing. Gastrointestinal:   Admits to mild postoperative abdominal pain. PEG tube is in place and functioning appropriately. Genitourinary:    Denies any urgency, frequency, hematuria. Voiding with use of a Carrera catheter. Extremities:   Denies lower extremity swelling, edema or cyanosis. Neurology:    Chronic debility with overall weakness. Psch:   Denies being anxious or depressed. Musculoskeletal:    Denies  myalgias, joint complaints or back pain. Integumentary:   Denies any rashes, ulcers, or excoriations. Denies bruising. Hematologic/Lymphatic:  Denies bruising or bleeding. Physical Exam:  I/O this shift: In: 709.8 [I.V.:432.1;  IV Piggyback:277.6]  Out: -     Intake/Output Summary (Last 24 hours) at 3/12/2022 1254  Last data filed at 3/12/2022 0926  Gross per 24 hour   Intake 3242.19 ml   Output 3850 ml   Net -607.81 ml   I/O last 3 completed shifts: In: 5374.6 [I.V.:4188. 1; NG/GT:319; IV Piggyback:867.5]  Out: 5250 [Urine:5250]  Patient Vitals for the past 96 hrs (Last 3 readings):   Weight   03/12/22 0005 118 lb 14.4 oz (53.9 kg)   03/11/22 0124 118 lb 3.2 oz (53.6 kg)   03/10/22 0052 113 lb 4.8 oz (51.4 kg)     Vital Signs:   Blood pressure 134/80, pulse 110, temperature 99.9 °F (37.7 °C), temperature source Axillary, resp. rate 20, height 4' 11\" (1.499 m), weight 118 lb 14.4 oz (53.9 kg), SpO2 95 %. General appearance:  More awake and alert than on my last examination. Head:  Normocephalic. No masses, lesions or tenderness. Eyes:  PERRLA. EOMI. Sclera clear. ENT:  Ears normal. Mucosa normal.  Neck:    Supple. Trachea midline. No thyromegaly. No JVD. No bruits. Heart:    Rhythm regular. Rate controlled. No murmurs. Lungs:    Diminished with bibasilar rales. Abdomen:   PEG tube is in place and functioning appropriately. Extremities:    Peripheral pulses present. No peripheral edema. No ulcers. No cyanosis. No clubbing. Neurologic:    Awake and alert. She answers questions. She is somewhat slow to respond. She is very weak and deconditioned. Psych:   Behavior is normal. Mood appears normal. Speech is not rapid and/or pressured. Musculoskeletal:   Spine ROM normal. Muscular strength intact. Gait not assessed. Integumentary:  Excoriation under the breasts- yeast.   Genitalia/Breast:  Voiding with use of a Carrera catheter.     Medication:  Scheduled Meds:   buPROPion  100 mg Oral BID    levothyroxine  50 mcg Oral Daily    sertraline  100 mg Oral Daily    vancomycin  1,250 mg IntraVENous Q24H    QUEtiapine  25 mg Oral Nightly    pantoprazole  40 mg IntraVENous BID    miconazole   Topical BID    Petrolatum   Topical BID    polyethylene glycol-electrolytes  4,000 mL Per G Tube Once    piperacillin-tazobactam  3,375 mg IntraVENous Q8H    fluconazole  200 mg IntraVENous Q24H    heparin (porcine)  5,000 Units SubCUTAneous 3 times per day     Continuous Infusions:   sodium chloride         Objective Data:  CBC with Differential:    Lab Results   Component Value Date    WBC 17.8 03/12/2022    RBC 3.19 03/12/2022    HGB 9.9 03/12/2022    HCT 30.3 03/12/2022     03/12/2022    MCV 95.0 03/12/2022    MCH 31.0 03/12/2022    MCHC 32.7 03/12/2022    RDW 14.6 03/12/2022    NRBC 0.9 03/09/2022    METASPCT 2.0 03/12/2022    LYMPHOPCT 13.0 03/12/2022    MONOPCT 2.0 03/12/2022    MYELOPCT 2.0 03/12/2022    BASOPCT 0.0 03/12/2022    MONOSABS 0.36 03/12/2022    LYMPHSABS 2.31 03/12/2022    EOSABS 1.25 03/12/2022    BASOSABS 0.00 03/12/2022     CMP:    Lab Results   Component Value Date     03/12/2022    K 4.4 03/12/2022    CL 99 03/12/2022    CO2 24 03/12/2022    BUN 5 03/12/2022    CREATININE 0.4 03/12/2022    GFRAA >60 03/12/2022    LABGLOM >60 03/12/2022    GLUCOSE 126 03/12/2022    PROT 6.3 03/12/2022    LABALBU 3.5 03/12/2022    CALCIUM 9.8 03/12/2022    BILITOT 1.0 03/12/2022    ALKPHOS 129 03/12/2022    AST 25 03/12/2022    ALT 31 03/12/2022       Wound Documentation:   Wound 03/10/22 Abdomen Medial;Upper (Active)   Dressing Status Other (Comment) 03/10/22 1319   Drainage Amount Scant 03/10/22 1319   Number of days: 0       Assessment:  1. Sepsis in the setting of dislodged PEG tube with developing abscess and intra-abdominal infection status post new PEG tube insertion  2. MRSA bacteremia  3. Fecal impaction with ongoing improvement  4. Anemia of chronic disease  5. Paroxysmal atrial fibrillation  6. Pubic rami fractures  7. Intertrigo under the breasts bilaterally   8. Failure to thrive with severe protein calorie malnutrition  9. Neuromuscular disorder of undetermined etiology with known psychiatric dysfunction as per the chart  10. Hypothyroidism  11.  Essential hypertension  12. Hyperlipidemia      Plan:   I believe LTAC would be in the patient's best interest moving forward. The social work team is following for this purpose. Antibiotic therapy is being employed as per the infectious disease team.  PEG tube is been reinserted and tube feeds have been started. She seems to be tolerating these without complication. IV fluids will be discontinued and we will hydrate via free water flushes. She continues to require extensive work with the therapy teams. No family members were present during my examination    More than 50% of my  time was spent at the bedside counseling/coordinating care with the patient and/or family with face to face contact. This time was spent reviewing notes and laboratory data as well as instructing and counseling the patient. Time I spent with the family or surrogate(s) is included only if the patient was incapable of providing the necessary information or participating in medical decisions. I also discussed the differential diagnosis and all of the proposed management plans with the patient and individuals accompanying the patient. Ottoniel Martinez requires this high level of physician care and nursing on the [facility] unit due the complexity of decision management and chance of rapid decline or death. Continued cardiac monitoring and higher level of nursing are required. I am readily available for any further decision-making and intervention.      Edwin Sofia DO, D.O., [place]  12:54 PM  3/12/2022

## 2022-03-13 PROBLEM — A41.9 SEPSIS (HCC): Status: ACTIVE | Noted: 2022-03-13

## 2022-03-13 LAB
ALBUMIN SERPL-MCNC: 3.2 G/DL (ref 3.5–5.2)
ALP BLD-CCNC: 153 U/L (ref 35–104)
ALT SERPL-CCNC: 28 U/L (ref 0–32)
ANION GAP SERPL CALCULATED.3IONS-SCNC: 13 MMOL/L (ref 7–16)
AST SERPL-CCNC: 35 U/L (ref 0–31)
BASOPHILS ABSOLUTE: 0.1 E9/L (ref 0–0.2)
BASOPHILS RELATIVE PERCENT: 0.6 % (ref 0–2)
BILIRUB SERPL-MCNC: 0.7 MG/DL (ref 0–1.2)
BUN BLDV-MCNC: 7 MG/DL (ref 6–23)
CALCIUM SERPL-MCNC: 9.8 MG/DL (ref 8.6–10.2)
CHLORIDE BLD-SCNC: 100 MMOL/L (ref 98–107)
CO2: 24 MMOL/L (ref 22–29)
CREAT SERPL-MCNC: 0.4 MG/DL (ref 0.5–1)
EOSINOPHILS ABSOLUTE: 1.06 E9/L (ref 0.05–0.5)
EOSINOPHILS RELATIVE PERCENT: 5.9 % (ref 0–6)
GFR AFRICAN AMERICAN: >60
GFR NON-AFRICAN AMERICAN: >60 ML/MIN/1.73
GLUCOSE BLD-MCNC: 124 MG/DL (ref 74–99)
HCT VFR BLD CALC: 28.8 % (ref 34–48)
HCT VFR BLD CALC: 29.6 % (ref 34–48)
HCT VFR BLD CALC: 29.8 % (ref 34–48)
HCT VFR BLD CALC: 29.9 % (ref 34–48)
HEMOGLOBIN: 9.5 G/DL (ref 11.5–15.5)
HEMOGLOBIN: 9.6 G/DL (ref 11.5–15.5)
HEMOGLOBIN: 9.8 G/DL (ref 11.5–15.5)
HEMOGLOBIN: 9.8 G/DL (ref 11.5–15.5)
HYPOCHROMIA: ABNORMAL
IMMATURE GRANULOCYTES #: 0.7 E9/L
IMMATURE GRANULOCYTES %: 3.9 % (ref 0–5)
LYMPHOCYTES ABSOLUTE: 2.31 E9/L (ref 1.5–4)
LYMPHOCYTES RELATIVE PERCENT: 12.9 % (ref 20–42)
MCH RBC QN AUTO: 31.7 PG (ref 26–35)
MCHC RBC AUTO-ENTMCNC: 33.1 % (ref 32–34.5)
MCV RBC AUTO: 95.8 FL (ref 80–99.9)
MONOCYTES ABSOLUTE: 1.02 E9/L (ref 0.1–0.95)
MONOCYTES RELATIVE PERCENT: 5.7 % (ref 2–12)
NEUTROPHILS ABSOLUTE: 12.71 E9/L (ref 1.8–7.3)
NEUTROPHILS RELATIVE PERCENT: 71 % (ref 43–80)
OVALOCYTES: ABNORMAL
PDW BLD-RTO: 14.4 FL (ref 11.5–15)
PLATELET # BLD: 299 E9/L (ref 130–450)
PMV BLD AUTO: 10.4 FL (ref 7–12)
POLYCHROMASIA: ABNORMAL
POTASSIUM SERPL-SCNC: 4.2 MMOL/L (ref 3.5–5)
RBC # BLD: 3.09 E12/L (ref 3.5–5.5)
SODIUM BLD-SCNC: 137 MMOL/L (ref 132–146)
TEAR DROP CELLS: ABNORMAL
TOTAL PROTEIN: 6.2 G/DL (ref 6.4–8.3)
WBC # BLD: 17.9 E9/L (ref 4.5–11.5)

## 2022-03-13 PROCEDURE — 6370000000 HC RX 637 (ALT 250 FOR IP): Performed by: NURSE PRACTITIONER

## 2022-03-13 PROCEDURE — C9113 INJ PANTOPRAZOLE SODIUM, VIA: HCPCS | Performed by: SURGERY

## 2022-03-13 PROCEDURE — 2580000003 HC RX 258: Performed by: INTERNAL MEDICINE

## 2022-03-13 PROCEDURE — 2700000000 HC OXYGEN THERAPY PER DAY

## 2022-03-13 PROCEDURE — 2580000003 HC RX 258: Performed by: SURGERY

## 2022-03-13 PROCEDURE — 85018 HEMOGLOBIN: CPT

## 2022-03-13 PROCEDURE — 97110 THERAPEUTIC EXERCISES: CPT

## 2022-03-13 PROCEDURE — 85014 HEMATOCRIT: CPT

## 2022-03-13 PROCEDURE — 85025 COMPLETE CBC W/AUTO DIFF WBC: CPT

## 2022-03-13 PROCEDURE — 6370000000 HC RX 637 (ALT 250 FOR IP): Performed by: SURGERY

## 2022-03-13 PROCEDURE — 6370000000 HC RX 637 (ALT 250 FOR IP): Performed by: INTERNAL MEDICINE

## 2022-03-13 PROCEDURE — 36415 COLL VENOUS BLD VENIPUNCTURE: CPT

## 2022-03-13 PROCEDURE — 6360000002 HC RX W HCPCS: Performed by: INTERNAL MEDICINE

## 2022-03-13 PROCEDURE — 2060000000 HC ICU INTERMEDIATE R&B

## 2022-03-13 PROCEDURE — 6360000002 HC RX W HCPCS: Performed by: SURGERY

## 2022-03-13 PROCEDURE — 80053 COMPREHEN METABOLIC PANEL: CPT

## 2022-03-13 RX ADMIN — FLUCONAZOLE IN SODIUM CHLORIDE 200 MG: 2 INJECTION, SOLUTION INTRAVENOUS at 08:52

## 2022-03-13 RX ADMIN — ANTI-FUNGAL POWDER MICONAZOLE NITRATE TALC FREE: 1.42 POWDER TOPICAL at 20:01

## 2022-03-13 RX ADMIN — BUPROPION HYDROCHLORIDE 50 MG: 100 TABLET, FILM COATED ORAL at 13:55

## 2022-03-13 RX ADMIN — PIPERACILLIN SODIUM AND TAZOBACTAM SODIUM 3375 MG: 3; 375 INJECTION, POWDER, FOR SOLUTION INTRAVENOUS at 20:46

## 2022-03-13 RX ADMIN — QUETIAPINE FUMARATE 25 MG: 25 TABLET ORAL at 20:00

## 2022-03-13 RX ADMIN — PANTOPRAZOLE SODIUM 40 MG: 40 INJECTION, POWDER, FOR SOLUTION INTRAVENOUS at 08:58

## 2022-03-13 RX ADMIN — ANTI-FUNGAL POWDER MICONAZOLE NITRATE TALC FREE: 1.42 POWDER TOPICAL at 08:58

## 2022-03-13 RX ADMIN — BUPROPION HYDROCHLORIDE 50 MG: 100 TABLET, FILM COATED ORAL at 08:57

## 2022-03-13 RX ADMIN — VANCOMYCIN HYDROCHLORIDE 1250 MG: 10 INJECTION, POWDER, LYOPHILIZED, FOR SOLUTION INTRAVENOUS at 03:54

## 2022-03-13 RX ADMIN — LEVOTHYROXINE SODIUM 50 MCG: 0.05 TABLET ORAL at 05:37

## 2022-03-13 RX ADMIN — HEPARIN SODIUM 5000 UNITS: 5000 INJECTION INTRAVENOUS; SUBCUTANEOUS at 05:30

## 2022-03-13 RX ADMIN — BUPROPION HYDROCHLORIDE 50 MG: 100 TABLET, FILM COATED ORAL at 03:57

## 2022-03-13 RX ADMIN — HEPARIN SODIUM 5000 UNITS: 5000 INJECTION INTRAVENOUS; SUBCUTANEOUS at 22:55

## 2022-03-13 RX ADMIN — SERTRALINE 100 MG: 50 TABLET, FILM COATED ORAL at 08:58

## 2022-03-13 RX ADMIN — PANTOPRAZOLE SODIUM 40 MG: 40 INJECTION, POWDER, FOR SOLUTION INTRAVENOUS at 20:00

## 2022-03-13 RX ADMIN — ACETAMINOPHEN 650 MG: 325 TABLET ORAL at 15:44

## 2022-03-13 RX ADMIN — PIPERACILLIN SODIUM AND TAZOBACTAM SODIUM 3375 MG: 3; 375 INJECTION, POWDER, FOR SOLUTION INTRAVENOUS at 05:37

## 2022-03-13 RX ADMIN — PETROLATUM: 420 OINTMENT TOPICAL at 08:58

## 2022-03-13 RX ADMIN — PETROLATUM: 420 OINTMENT TOPICAL at 20:02

## 2022-03-13 RX ADMIN — HEPARIN SODIUM 5000 UNITS: 5000 INJECTION INTRAVENOUS; SUBCUTANEOUS at 13:55

## 2022-03-13 RX ADMIN — PIPERACILLIN SODIUM AND TAZOBACTAM SODIUM 3375 MG: 3; 375 INJECTION, POWDER, FOR SOLUTION INTRAVENOUS at 12:18

## 2022-03-13 RX ADMIN — BUPROPION HYDROCHLORIDE 50 MG: 100 TABLET, FILM COATED ORAL at 20:00

## 2022-03-13 ASSESSMENT — PAIN SCALES - GENERAL
PAINLEVEL_OUTOF10: 0
PAINLEVEL_OUTOF10: 0
PAINLEVEL_OUTOF10: 3
PAINLEVEL_OUTOF10: 3
PAINLEVEL_OUTOF10: 0

## 2022-03-13 ASSESSMENT — PAIN SCALES - PAIN ASSESSMENT IN ADVANCED DEMENTIA (PAINAD)
BODYLANGUAGE: 1
NEGVOCALIZATION: 0
CONSOLABILITY: 0
FACIALEXPRESSION: 0
FACIALEXPRESSION: 1
NEGVOCALIZATION: 0
BREATHING: 0
CONSOLABILITY: 0
CONSOLABILITY: 1
NEGVOCALIZATION: 0
BREATHING: 0
FACIALEXPRESSION: 0
TOTALSCORE: 3
BREATHING: 0
BODYLANGUAGE: 0
TOTALSCORE: 0
TOTALSCORE: 0
BODYLANGUAGE: 0

## 2022-03-13 ASSESSMENT — PAIN DESCRIPTION - PAIN TYPE: TYPE: ACUTE PAIN

## 2022-03-13 ASSESSMENT — PAIN DESCRIPTION - LOCATION: LOCATION: NECK

## 2022-03-13 NOTE — PROGRESS NOTES
[facility]  [facility]  Progress Note    NAME: Suleiman Pennington  MR:  [id number]  :   1945  DATE OF SERVICE:22    This is a face to face encounter with Suleiman Pennington 67 y.o. female on 22    CHIEF COMPLAINT     ID following for No chief complaint on file. HISTORY OF PRESENT ILLNESS   Pt seen and examined  22   In bed working with pt  Son present and updated  Tmax99.7 2L weakness    3/12/2022  PER STAFF PT HAD A FEVER SXCL181.8 4L  HER TF IS GOING THROUGH HER STOMA BAG   SHE HAS NO C/O F/C/N/V/D    3/11/2022  More awake and alert has no c/o   Afebrile 3L    3/10/2022  Seen postop EGD ESOPHAGOGASTRODUODENOSCOPY PEG TUBE INSERTION, exam under anesthesia, manual fecal disimpaction, control perianal hemorrhage, rectal packing     nad  Has pain     Patient is tolerating medications. No reported adverse drug reactions. REVIEW OF SYSTEMS     As stated above in the chief complaint, otherwise negative.   CURRENT MEDICATIONS      levothyroxine  50 mcg Oral Daily    sertraline  100 mg Oral Daily    buPROPion  50 mg Oral Q6H    vancomycin  1,250 mg IntraVENous Q24H    QUEtiapine  25 mg Oral Nightly    pantoprazole  40 mg IntraVENous BID    miconazole   Topical BID    Petrolatum   Topical BID    polyethylene glycol-electrolytes  4,000 mL Per G Tube Once    piperacillin-tazobactam  3,375 mg IntraVENous Q8H    fluconazole  200 mg IntraVENous Q24H    heparin (porcine)  5,000 Units SubCUTAneous 3 times per day     Continuous Infusions:   sodium chloride       PRN Meds:acetaminophen, Petrolatum **AND** Petrolatum, sodium chloride, morphine, ondansetron, albuterol    PHYSICAL EXAM     BP (!) 112/59   Pulse 121   Temp 99.7 °F (37.6 °C) (Axillary)   Resp 20   Ht 4' 11\" (1.499 m)   Wt 114 lb 5 oz (51.9 kg)   SpO2 94%   BMI 23.09 kg/m²   Temp  Av.5 °F (37.5 °C)  Min: 99.4 °F (37.4 °C)  Max: 99.7 °F (37.6 °C)  Constitutional:  The patient is awake, alert pale  Skin:    Warm and dry. HEENT:     AT/NC  Chest:   No use of accessory muscles to breathe. Symmetrical expansion. Cardiovascular:  S1 and S2 are rhythmic and regular. Abdomen:   Positive bowel sounds to auscultation. tender peg binder stoma with tf  Extremities:      edema. CNS    Awake working with pt  Lines: piv  Carrera yellow CLEAR      DIAGNOSTIC RESULTS   Radiology:    Recent Labs     03/11/22  0736 03/11/22  0856 03/12/22  0817 03/12/22  1412 03/13/22  0309 03/13/22  0832 03/13/22  1348   WBC 12.5*  --  17.8*  --  17.9*  --   --    RBC 2.67*  --  3.19*  --  3.09*  --   --    HGB 8.3*   < > 9.9*   < > 9.8* 9.8* 9.5*   HCT 26.1*   < > 30.3*   < > 29.6* 29.8* 28.8*   MCV 97.8  --  95.0  --  95.8  --   --    MCH 31.1  --  31.0  --  31.7  --   --    MCHC 31.8*  --  32.7  --  33.1  --   --    RDW 15.9*  --  14.6  --  14.4  --   --      --  268  --  299  --   --    MPV 11.3  --  10.2  --  10.4  --   --     < > = values in this interval not displayed.      Recent Labs     03/11/22  1548 03/12/22  0817 03/13/22  0309    134 137   K 4.6 4.4 4.2   * 99 100   CO2 19* 24 24   BUN 6 5* 7   CREATININE 0.4* 0.4* 0.4*   GLUCOSE 77 126* 124*   PROT 5.9* 6.3* 6.2*   LABALBU 3.4* 3.5 3.2*   CALCIUM 9.4 9.8 9.8   BILITOT 0.7 1.0 0.7   ALKPHOS 125* 129* 153*   AST 27 25 35*   ALT 34* 31 28     Lab Results   Component Value Date    CRP 31.9 (H) 03/08/2022     Lab Results   Component Value Date    SEDRATE 109 (H) 03/08/2022     Recent Labs     03/11/22  1548 03/12/22  0817 03/13/22  0309   AST 27 25 35*   ALT 34* 31 28       Microbiology:   Recent Labs     03/12/22  0958   COVID19 Not Detected     Lab Results   Component Value Date    BC 24 Hours no growth 03/08/2022    BLOODCULT2 24 Hours no growth 03/08/2022    ORG Staphylococcus aureus 03/08/2022    ORG Klebsiella oxytoca 07/01/2014     Travessa Circe 852        urine 2/16/2022          FINAL IMPRESSION     Admitted for Sepsis   On treatment for MRSA sepsis/uti KADEN neg   Peg infection s/p reinsertion new site  Gib/fecal impaction   leukocytosis reactive   FEVERS CHECK COVID BLOOD CX CXRY        vancomycin (VANCOCIN) 1,000 mg in dextrose 5 % 250 mL IVPB, Q12H plan 4 weeks from 3/8   piperacillin-tazobactam (ZOSYN) 3,375 mg in dextrose 5 % 50 mL IVPB extended infusion (mini-bag), Q8H day 5  fluconazole (DIFLUCAN) in 0.9 % sodium chloride IVPB 200 mg, Q24H     Cont atbx  CONT WOUND CARE  For LTAC     · Monitor labs    Imaging and labs were reviewed per medical records. Thank you for involving me in the care of Brayden Hernandez will continue to follow. Please do not hesitate to call for any questions or concerns.     Electronically signed by Merline Ala, MD on 3/13/2022 at 3:22 PM

## 2022-03-13 NOTE — PROGRESS NOTES
Pharmacy Consultation Note  (Antibiotic Dosing and Monitoring)    Initial consult date: 3/9/22  Consulting physician/provider: Dr. Coleman Schafer  Drug: Vancomycin  Indication: MRSA bacteremia    Age/  Gender Height Weight IBW  Allergy Information   76 y.o./female 4' 11\" (149.9 cm) 115 lb (52.2 kg)     Patient must be at least 60 in tall to calculate ideal body weight   Abilify [aripiprazole], Depakote [divalproex sodium], Dye [iodides], Geodon [ziprasidone hcl], Levsin [hyoscyamine], Lithium, Neurontin [gabapentin], Risperidone and related, Tegretol [carbamazepine], Tramadol, Trileptal [oxcarbazepine], and Zyprexa [olanzapine]      Renal Function:  Recent Labs     03/11/22  1548 03/12/22  0817 03/13/22  0309   BUN 6 5* 7   CREATININE 0.4* 0.4* 0.4*       Intake/Output Summary (Last 24 hours) at 3/13/2022 1720  Last data filed at 3/13/2022 1538  Gross per 24 hour   Intake 875.96 ml   Output 2600 ml   Net -1724.04 ml       Vancomycin Monitoring:  Trough:    Recent Labs     03/10/22  1720   VANCOTROUGH 13.2     Random:  No results for input(s): VANCORANDOM in the last 72 hours. Vancomycin Administration Times:  Recent vancomycin administrations                   vancomycin (VANCOCIN) 1,250 mg in dextrose 5 % 250 mL IVPB (mg) 1,250 mg New Bag 03/12/22 0528    vancomycin (VANCOCIN) 1,000 mg in dextrose 5 % 250 mL IVPB (mg) 1,000 mg New Bag 03/11/22 0331     1,000 mg New Bag 03/10/22 1840     1,000 mg New Bag  0224     1,000 mg New Bag 03/09/22 1636                  Assessment:  · Patient is a 67 y.o. female who has been initiated on vancomycin for MRSA bacteremia per chart review. The positive cultures are from an outside facility that were reported, so has not been treated. Repeat blood cultures have been collected here and show NGTD.   · CrCl ~40-50 mL/min  · To dose vancomycin, pharmacy will be utilizing [facility] calculation software for goal AUC/CLIFF 400-600 mg/L-hr   · 3/11: Level collected yesterday @ 3351 (instead of today @ 1430) = 13.2 mcg/mL, AUC/CLIFF 717    Plan:  · Continue Vancomycin 1,250 IV every 24 hours  · Repeat level on new regimen  · Will continue to monitor renal function   · Clinical pharmacy will continue to follow      Vicky Moeller PharmD 3/13/2022 5:20 PM   [phone]

## 2022-03-13 NOTE — PROGRESS NOTES
Physical Therapy Treatment Note/Plan of Care    Room #:  3796/0205-47  Patient Name: Kitty Banks  YOB: 1945  MRN: [id number]    Date of Service: 3/13/2022     Tentative placement recommendation: Subacute rehab  Equipment recommendation: To be determined      Evaluating Physical Therapist: Mickie Zelaya #[id number]      Specific Provider Orders/Date/Referring Provider :   03/08/22 2030   PT eval and treat Start: 03/08/22 2030, End: 03/08/22 2030, ONE TIME, Standing Count: 1 Occurrences, R    Idella Gain, DO      Admitting Diagnosis:   Sepsis, unspecified organism [A41.9]  Sepsis ([facility] [address] [A41.9]      Surgery:      PEG tube placement on 3/3/202      Patient Active Problem List   Diagnosis    Odontoid fracture ([facility] [address]    Hypothyroid    Multiple rib fractures    TMJ (temporomandibular joint syndrome)    Bipolar disorder ([facility] [address]    Sciatica    Hiatal hernia    Abnormality of gait and mobility    Trigeminal neuralgia    Headache    Cervical neck pain with evidence of disc disease    Vomiting    Hypokalemia    Intra-abdominal infection    MRSA bacteremia    Paroxysmal atrial fibrillation (HCC)    Hypotension    Hypernatremia    Moderate protein-calorie malnutrition (HCC)    Sepsis (HCC)        ASSESSMENT of Current Deficits Patient exhibits decreased strength, balance and endurance impairing functional mobility, transfers, gait distance and tolerance to activity. Patient assisted with supine exercises DEIDRE PATRICIA/PATRICK. Patient requires Max assist to roll in bed to place wedges. Patient tries to verbalize answers throughout session. The patient will benefit from continued skilled therapy to increase strength and improve balance for safe functional mobility, to decrease risk of falls, and to meet goals at discharge.          PHYSICAL THERAPY  PLAN OF CARE       Physical therapy plan of care is established based on physician order,  patient diagnosis and clinical assessment    Current Treatment Recommendations:    -Bed Mobility: Lower extremity exercises   -Sitting Balance: Incorporate reaching activities to activate trunk muscles , Hands on support to maintain midline , Facilitate active trunk muscle engagement  and Facilitate postural control in all planes   -Standing Balance: Perform strengthening exercises in standing to promote motor control with or without upper extremity support  and Instruct patient on adequate base of support to maintain balance  -Transfers: Provide instruction on proper hand and foot position for adequate transfer of weight onto lower extremities and use of gait device if needed and Cues for hand placement, technique and safety. Provide stabilization to prevent fall     PT long term treatment goals are located in below grid    Patient and or family understand(s) diagnosis, prognosis, and plan of care. Frequency of treatments: Patient will be seen  3-5 times/week.          Prior Level of Function: Patient out of bed to w/c- no history of type of transfer  Rehab Potential: good  for baseline    Past medical history:   Past Medical History:   Diagnosis Date    Allergic     Anxiety disorder     Arthritis     Difficulty walking     Dysphagia     Hyperlipidemia     Hypertension     Interstitial cystitis     Lack of coordination     Mitral valve prolapse     pt. has a mitral valve prolapse    Neuromuscular disorder (HCC)     Other disorders of kidney and ureter in diseases classified elsewhere     Pelvis fracture (HCC)     multiple fxs pelvis    Psychiatric problem     Thyroid disease     Trigeminal neuralgia     Unspecified dementia without behavioral disturbance ([facility] [address]      Past Surgical History:   Procedure Laterality Date    FRACTURE SURGERY      GASTROSTOMY TUBE PLACEMENT N/A 3/3/2022    EGD PEG TUBE PLACEMENT performed by Fiona Wahl [facility] at [facility]     UPPER GASTROINTESTINAL ENDOSCOPY N/A 3/10/2022    EGD ESOPHAGOGASTRODUODENOSCOPY PEG TUBE INSERTION; EVACUATION OF STOOL IMPACTON performed by Maci Tyson MD at [facility]:    Precautions:  falls , abdominal drain, armenta, O2, Incontinent of bowel  Social history: Came to hospital from [facility] ED and reported there from nursing home. Patient trevor a poor historian. Equipment owned: Wheelchair,      [address]   How much difficulty turning over in bed?: A Lot  How much difficulty sitting down on / standing up from a chair with arms?: A Lot  How much difficulty moving from lying on back to sitting on side of bed?: A Lot  How much help from another person moving to and from a bed to a chair?: Total  How much help from another person needed to walk in hospital room?: Total  How much help from another person for climbing 3-5 steps with a railing?: Total  AM-PAC Inpatient Mobility Raw Score : 9  AM-[facility] Inpatient T-Scale Score : 30.55  Mobility Inpatient CMS 0-100% Score: 81.38  Mobility Inpatient CMS G-Code Modifier : CM    Nursing cleared patient for PT treatment. Patient's son present during session. OBJECTIVE;   Initial Evaluation  Date: 3/10/2022 Treatment Date:  3/13/2022     Short Term/ Long Term   Goals   Was pt agreeable to Eval/treatment? Yes yes To be met in 5 days   Pain level   0/10   Neck pain, no number assigned    Bed Mobility    Rolling: Maximal assist of 1    Supine to sit: Maximal assist of  2    Sit to supine: Maximal assist of  2    Scooting: Maximal assist of 1   Rolling: Maximal assist of 1   Supine to sit: Not assessed    Sit to supine: Not assessed    Scooting: Not assessed     Rolling: Minimal assist of 1    Supine to sit: Maximal assist of 1    Sit to supine: Maximal assist of 1    Scooting: Moderate assist of 1     Transfers Sit to stand: Not assessed  d/t to pt overall debility, decreased activity tolerance, balance deficits, safety and fall risk.    Sit to stand: Not assessed       Sit to stand: Maximal assist of 1    Ambulation    not assessed at this time due to not appropriate. not appropriate at this time. To re-assess at a later time if appropriate     ROM Within functional limits    Increase range of motion 10% of affected joints    Strength BUE:  refer to OT ryanne  RLE:  3/5  LLE:  3/5  Increase strength in affected mm groups by 1/3 grade   Balance Sitting EOB:  poor, posterior lean, max assist x2 to remain upright   Dynamic Standing:  not assessed  Sitting EOB: not assessed   Dynamic Standing: not assessed    Sitting EOB:  fair   Dynamic Standing: fair      Patient is Alert & Oriented x person and time and follows one step directions    Sensation:  Patient  denies numbness/tingling   Edema:  no   Endurance: poor    Vitals: 2 liters nasal cannula   Blood Pressure at rest  Blood Pressure during session    Heart Rate at rest  Heart Rate during session    SPO2 at rest %  SPO2 during session %     Patient education  Patient educated on role of Physical Therapy, risks of immobility, safety and plan of care,  importance of mobility while in hospital , ankle pumps, quad set and glut set for edema control, blood clot prevention, safety  and positioning for skin integrity and comfort     Patient response to education:   Pt verbalized understanding Pt demonstrated skill Pt requires further education in this area   Yes Partial Yes      Treatment:  Patient practiced and was instructed/facilitated in the following treatment: Patient assisted with supine exercises, UE/LE. Patient assisted to rolling for placement of wedges for skin intregrity. Therapeutic Exercises:AAROM ankle pumps, heel slide, hip abduction/adduction, straight leg raise, shoulder elevation, elbow flexion/extension and wrist flexion/extension  x 10-15 reps. At end of session, patient in bed with son present call light and phone within reach,  all lines and tubes intact, nursing notified.       Patient would benefit from continued skilled Physical Therapy to improve functional independence and quality of life.          Patient's/ family goals   none stated    Time in  222  Time out  244    Total Treatment Time  22 minutes    CPT codes:  Therapeutic exercises (77585)   18 minutes  1 unit(s)  Non billable time 4 minutes  talking to patient    Samson Holstein, PTA   #[id number]

## 2022-03-13 NOTE — PROGRESS NOTES
Internal Medicine Progress Note    [facility]    Arian Foster. Miguel Marrero., F.A.MAIOJillianI. Sujit Prather D.O., STEVOO.I. Reino Jeans, D.O. Peyton Gemma, MSN, APRN, NP-C  Adin Cummings. Margaret Henry, MSN, APRN-CNP     Primary Care Physician: Ayaka King MD   Admitting Physician:  Sarah Ivan DO  Admission date and time: 3/8/2022  6:25 PM    Room:  [address] [place] [address]  Admitting diagnosis: Sepsis, unspecified organism [A41.9]    Patient Name: Alona Branham  MRN: [id number]    Date of Service: 3/13/2022     Subjective:  Rafaela Miles is a 67 y.o. female who was seen and examined today,3/13/2022, at the bedside. Rafaela Miles is awake and alert today. She remains profoundly weak and deconditioned. She seems to be tolerating tube feeds at appropriate goal rate. Discharge planning is underway. Review of System:   Constitutional:   Persistent malaise and fatigue. HEENT:   Denies ear pain, sore throat, sinus or eye problems. Nasal cannula oxygen is in place. Cardiovascular:   Denies any chest pain, irregular heartbeats, or palpitations. Respiratory:   Denies shortness of breath, coughing, sputum production, hemoptysis, or wheezing. Gastrointestinal:   Admits to mild postoperative abdominal pain. PEG tube is in place and functioning appropriately. Ostomy collection bag is covering previous PEG tube site with drainage identified. Genitourinary:    Denies any urgency, frequency, hematuria. Voiding with use of a Carrera catheter. Extremities:   Denies lower extremity swelling, edema or cyanosis. Neurology:    Chronic debility with overall weakness. Psch:   Denies being anxious or depressed. Musculoskeletal:    Denies  myalgias, joint complaints or back pain. Integumentary:   Denies any rashes, ulcers, or excoriations. Denies bruising. Hematologic/Lymphatic:  Denies bruising or bleeding.       Physical Exam:  I/O this shift:  In: 60 [NG/GT:60]  Out: 500 [Urine:500]    Intake/Output Summary (Last 24 hours) at 3/13/2022 1122  Last data filed at 3/13/2022 1038  Gross per 24 hour   Intake 60 ml   Output 4200 ml   Net -4140 ml   I/O last 3 completed shifts: In: 3042.2 [I.V.:2290.1; NG/GT:319; IV Piggyback:433.1]  Out: 4422 [Urine:4150; Emesis/NG output:850; Stool:350]  Patient Vitals for the past 96 hrs (Last 3 readings):   Weight   03/13/22 0141 114 lb 5 oz (51.9 kg)   03/12/22 0005 118 lb 14.4 oz (53.9 kg)   03/11/22 0124 118 lb 3.2 oz (53.6 kg)     Vital Signs:   Blood pressure (!) 112/59, pulse 121, temperature 99.7 °F (37.6 °C), temperature source Axillary, resp. rate 20, height 4' 11\" (1.499 m), weight 114 lb 5 oz (51.9 kg), SpO2 94 %. General appearance:  More awake and alert than on my last examination. Head:  Normocephalic. No masses, lesions or tenderness. Eyes:  PERRLA. EOMI. Sclera clear. ENT:  Ears normal. Mucosa normal.  Neck:    Supple. Trachea midline. No thyromegaly. No JVD. No bruits. Heart:    Rhythm regular. Rate controlled. No murmurs. Lungs:    Diminished with bibasilar rales. Abdomen:   PEG tube is in place and functioning appropriately. Ostomy is in place covering previous PEG tube site. Drainage is identified. Extremities:    Peripheral pulses present. No peripheral edema. No ulcers. No cyanosis. No clubbing. Neurologic:    Awake and alert. She answers questions. She is somewhat slow to respond. She is very weak and deconditioned. Psych:   Behavior is normal. Mood appears normal. Speech is not rapid and/or pressured. Musculoskeletal:   Spine ROM normal. Muscular strength intact. Gait not assessed. Integumentary:  Excoriation under the breasts- yeast.   Genitalia/Breast:  Voiding with use of a Carrera catheter.     Medication:  Scheduled Meds:   levothyroxine  50 mcg Oral Daily    sertraline  100 mg Oral Daily    buPROPion  50 mg Oral Q6H    vancomycin  1,250 mg IntraVENous Q24H    QUEtiapine  25 mg Oral Nightly    pantoprazole  40 mg disorder of undetermined etiology with known psychiatric dysfunction as per the chart  10. Hypothyroidism  11. Essential hypertension  12. Hyperlipidemia      Plan:   I believe LTAC would be in the patient's discharge planning is underway as we are awaiting precertification for transfer to an [address]. She is maintained on IV antibiotic therapy as per the infectious disease team.  New PEG tube seems to be functioning appropriately and tube feeds are being advanced to goal rate. Ostomy bag is covering previous PEG tube site with drainage identified. She requires substantial work with the therapy teams. She is maintained on nasal cannula oxygen. More than 50% of my  time was spent at the bedside counseling/coordinating care with the patient and/or family with face to face contact. This time was spent reviewing notes and laboratory data as well as instructing and counseling the patient. Time I spent with the family or surrogate(s) is included only if the patient was incapable of providing the necessary information or participating in medical decisions. I also discussed the differential diagnosis and all of the proposed management plans with the patient and individuals accompanying the patient. Dagoberto Dueñas requires this high level of physician care and nursing on the [facility] unit due the complexity of decision management and chance of rapid decline or death. Continued cardiac monitoring and higher level of nursing are required. I am readily available for any further decision-making and intervention.      Power Matias DO, D.O., [facility]  11:22 AM  3/13/2022

## 2022-03-14 ENCOUNTER — APPOINTMENT (OUTPATIENT)
Dept: CT IMAGING | Age: 77
DRG: 862 | End: 2022-03-14
Attending: INTERNAL MEDICINE
Payer: COMMERCIAL

## 2022-03-14 ENCOUNTER — APPOINTMENT (OUTPATIENT)
Dept: GENERAL RADIOLOGY | Age: 77
DRG: 862 | End: 2022-03-14
Attending: INTERNAL MEDICINE
Payer: COMMERCIAL

## 2022-03-14 LAB
ABO/RH: NORMAL
ALBUMIN SERPL-MCNC: 3.3 G/DL (ref 3.5–5.2)
ALP BLD-CCNC: 110 U/L (ref 35–104)
ALT SERPL-CCNC: 26 U/L (ref 0–32)
ANION GAP SERPL CALCULATED.3IONS-SCNC: 14 MMOL/L (ref 7–16)
ANTIBODY SCREEN: NORMAL
AST SERPL-CCNC: 27 U/L (ref 0–31)
BASOPHILS ABSOLUTE: 0.06 E9/L (ref 0–0.2)
BASOPHILS RELATIVE PERCENT: 0.4 % (ref 0–2)
BILIRUB SERPL-MCNC: 0.6 MG/DL (ref 0–1.2)
BLOOD CULTURE, ROUTINE: NORMAL
BUN BLDV-MCNC: 10 MG/DL (ref 6–23)
CALCIUM SERPL-MCNC: 10.3 MG/DL (ref 8.6–10.2)
CHLORIDE BLD-SCNC: 102 MMOL/L (ref 98–107)
CO2: 25 MMOL/L (ref 22–29)
CREAT SERPL-MCNC: 0.5 MG/DL (ref 0.5–1)
CULTURE, BLOOD 2: NORMAL
EOSINOPHILS ABSOLUTE: 0.84 E9/L (ref 0.05–0.5)
EOSINOPHILS RELATIVE PERCENT: 5.2 % (ref 0–6)
GFR AFRICAN AMERICAN: >60
GFR NON-AFRICAN AMERICAN: >60 ML/MIN/1.73
GLUCOSE BLD-MCNC: 105 MG/DL (ref 74–99)
HCT VFR BLD CALC: 24.3 % (ref 34–48)
HCT VFR BLD CALC: 26 % (ref 34–48)
HCT VFR BLD CALC: 27.8 % (ref 34–48)
HCT VFR BLD CALC: 29.5 % (ref 34–48)
HCT VFR BLD CALC: 30.1 % (ref 34–48)
HEMOGLOBIN: 8 G/DL (ref 11.5–15.5)
HEMOGLOBIN: 8.4 G/DL (ref 11.5–15.5)
HEMOGLOBIN: 9 G/DL (ref 11.5–15.5)
HEMOGLOBIN: 9.7 G/DL (ref 11.5–15.5)
HEMOGLOBIN: 9.7 G/DL (ref 11.5–15.5)
IMMATURE GRANULOCYTES #: 0.44 E9/L
IMMATURE GRANULOCYTES %: 2.7 % (ref 0–5)
LYMPHOCYTES ABSOLUTE: 2.32 E9/L (ref 1.5–4)
LYMPHOCYTES RELATIVE PERCENT: 14.3 % (ref 20–42)
MCH RBC QN AUTO: 31.8 PG (ref 26–35)
MCHC RBC AUTO-ENTMCNC: 32.9 % (ref 32–34.5)
MCV RBC AUTO: 96.7 FL (ref 80–99.9)
MONOCYTES ABSOLUTE: 1.08 E9/L (ref 0.1–0.95)
MONOCYTES RELATIVE PERCENT: 6.7 % (ref 2–12)
NEUTROPHILS ABSOLUTE: 11.45 E9/L (ref 1.8–7.3)
NEUTROPHILS RELATIVE PERCENT: 70.7 % (ref 43–80)
PDW BLD-RTO: 14.5 FL (ref 11.5–15)
PLATELET # BLD: 342 E9/L (ref 130–450)
PMV BLD AUTO: 10.5 FL (ref 7–12)
POTASSIUM SERPL-SCNC: 3.5 MMOL/L (ref 3.5–5)
RBC # BLD: 3.05 E12/L (ref 3.5–5.5)
SODIUM BLD-SCNC: 141 MMOL/L (ref 132–146)
TOTAL PROTEIN: 6.6 G/DL (ref 6.4–8.3)
WBC # BLD: 16.2 E9/L (ref 4.5–11.5)

## 2022-03-14 PROCEDURE — 6370000000 HC RX 637 (ALT 250 FOR IP): Performed by: INTERNAL MEDICINE

## 2022-03-14 PROCEDURE — 2580000003 HC RX 258: Performed by: SURGERY

## 2022-03-14 PROCEDURE — 85025 COMPLETE CBC W/AUTO DIFF WBC: CPT

## 2022-03-14 PROCEDURE — 86850 RBC ANTIBODY SCREEN: CPT

## 2022-03-14 PROCEDURE — 85018 HEMOGLOBIN: CPT

## 2022-03-14 PROCEDURE — 86901 BLOOD TYPING SEROLOGIC RH(D): CPT

## 2022-03-14 PROCEDURE — 86923 COMPATIBILITY TEST ELECTRIC: CPT

## 2022-03-14 PROCEDURE — 2060000000 HC ICU INTERMEDIATE R&B

## 2022-03-14 PROCEDURE — 72050 X-RAY EXAM NECK SPINE 4/5VWS: CPT

## 2022-03-14 PROCEDURE — C9113 INJ PANTOPRAZOLE SODIUM, VIA: HCPCS | Performed by: SURGERY

## 2022-03-14 PROCEDURE — 6360000002 HC RX W HCPCS: Performed by: INTERNAL MEDICINE

## 2022-03-14 PROCEDURE — 2580000003 HC RX 258: Performed by: NURSE PRACTITIONER

## 2022-03-14 PROCEDURE — 2700000000 HC OXYGEN THERAPY PER DAY

## 2022-03-14 PROCEDURE — 6360000002 HC RX W HCPCS: Performed by: SURGERY

## 2022-03-14 PROCEDURE — 2580000003 HC RX 258: Performed by: INTERNAL MEDICINE

## 2022-03-14 PROCEDURE — 86900 BLOOD TYPING SEROLOGIC ABO: CPT

## 2022-03-14 PROCEDURE — 36415 COLL VENOUS BLD VENIPUNCTURE: CPT

## 2022-03-14 PROCEDURE — 74176 CT ABD & PELVIS W/O CONTRAST: CPT

## 2022-03-14 PROCEDURE — 80053 COMPREHEN METABOLIC PANEL: CPT

## 2022-03-14 PROCEDURE — 85014 HEMATOCRIT: CPT

## 2022-03-14 PROCEDURE — 6370000000 HC RX 637 (ALT 250 FOR IP): Performed by: SURGERY

## 2022-03-14 PROCEDURE — P9016 RBC LEUKOCYTES REDUCED: HCPCS

## 2022-03-14 RX ORDER — HEPARIN SODIUM (PORCINE) LOCK FLUSH IV SOLN 100 UNIT/ML 100 UNIT/ML
3 SOLUTION INTRAVENOUS EVERY 12 HOURS SCHEDULED
Status: DISCONTINUED | OUTPATIENT
Start: 2022-03-14 | End: 2022-03-22 | Stop reason: HOSPADM

## 2022-03-14 RX ORDER — SODIUM CHLORIDE 9 MG/ML
INJECTION, SOLUTION INTRAVENOUS PRN
Status: DISCONTINUED | OUTPATIENT
Start: 2022-03-14 | End: 2022-03-22 | Stop reason: HOSPADM

## 2022-03-14 RX ORDER — LIDOCAINE HYDROCHLORIDE 10 MG/ML
5 INJECTION, SOLUTION EPIDURAL; INFILTRATION; INTRACAUDAL; PERINEURAL ONCE
Status: DISCONTINUED | OUTPATIENT
Start: 2022-03-14 | End: 2022-03-22 | Stop reason: HOSPADM

## 2022-03-14 RX ORDER — SODIUM CHLORIDE, SODIUM LACTATE, POTASSIUM CHLORIDE, CALCIUM CHLORIDE 600; 310; 30; 20 MG/100ML; MG/100ML; MG/100ML; MG/100ML
INJECTION, SOLUTION INTRAVENOUS CONTINUOUS
Status: DISCONTINUED | OUTPATIENT
Start: 2022-03-14 | End: 2022-03-15

## 2022-03-14 RX ORDER — SODIUM CHLORIDE 0.9 % (FLUSH) 0.9 %
5-40 SYRINGE (ML) INJECTION PRN
Status: DISCONTINUED | OUTPATIENT
Start: 2022-03-14 | End: 2022-03-22 | Stop reason: HOSPADM

## 2022-03-14 RX ORDER — SODIUM CHLORIDE 0.9 % (FLUSH) 0.9 %
5-40 SYRINGE (ML) INJECTION EVERY 12 HOURS SCHEDULED
Status: DISCONTINUED | OUTPATIENT
Start: 2022-03-14 | End: 2022-03-22 | Stop reason: HOSPADM

## 2022-03-14 RX ORDER — HEPARIN SODIUM (PORCINE) LOCK FLUSH IV SOLN 100 UNIT/ML 100 UNIT/ML
3 SOLUTION INTRAVENOUS PRN
Status: DISCONTINUED | OUTPATIENT
Start: 2022-03-14 | End: 2022-03-22 | Stop reason: HOSPADM

## 2022-03-14 RX ORDER — SODIUM CHLORIDE 9 MG/ML
25 INJECTION, SOLUTION INTRAVENOUS PRN
Status: DISCONTINUED | OUTPATIENT
Start: 2022-03-14 | End: 2022-03-22 | Stop reason: HOSPADM

## 2022-03-14 RX ADMIN — BUPROPION HYDROCHLORIDE 50 MG: 100 TABLET, FILM COATED ORAL at 02:35

## 2022-03-14 RX ADMIN — PIPERACILLIN SODIUM AND TAZOBACTAM SODIUM 3375 MG: 3; 375 INJECTION, POWDER, FOR SOLUTION INTRAVENOUS at 23:19

## 2022-03-14 RX ADMIN — PANTOPRAZOLE SODIUM 40 MG: 40 INJECTION, POWDER, FOR SOLUTION INTRAVENOUS at 20:53

## 2022-03-14 RX ADMIN — SODIUM CHLORIDE, POTASSIUM CHLORIDE, SODIUM LACTATE AND CALCIUM CHLORIDE: 600; 310; 30; 20 INJECTION, SOLUTION INTRAVENOUS at 15:33

## 2022-03-14 RX ADMIN — Medication 10 ML: at 20:53

## 2022-03-14 RX ADMIN — PETROLATUM: 420 OINTMENT TOPICAL at 20:53

## 2022-03-14 RX ADMIN — FLUCONAZOLE IN SODIUM CHLORIDE 200 MG: 2 INJECTION, SOLUTION INTRAVENOUS at 09:31

## 2022-03-14 RX ADMIN — SERTRALINE 100 MG: 50 TABLET, FILM COATED ORAL at 08:08

## 2022-03-14 RX ADMIN — HEPARIN SODIUM 5000 UNITS: 5000 INJECTION INTRAVENOUS; SUBCUTANEOUS at 05:35

## 2022-03-14 RX ADMIN — BUPROPION HYDROCHLORIDE 50 MG: 100 TABLET, FILM COATED ORAL at 08:11

## 2022-03-14 RX ADMIN — PIPERACILLIN SODIUM AND TAZOBACTAM SODIUM 3375 MG: 3; 375 INJECTION, POWDER, FOR SOLUTION INTRAVENOUS at 15:28

## 2022-03-14 RX ADMIN — VANCOMYCIN HYDROCHLORIDE 1250 MG: 10 INJECTION, POWDER, LYOPHILIZED, FOR SOLUTION INTRAVENOUS at 05:08

## 2022-03-14 RX ADMIN — ANTI-FUNGAL POWDER MICONAZOLE NITRATE TALC FREE: 1.42 POWDER TOPICAL at 20:53

## 2022-03-14 RX ADMIN — SODIUM CHLORIDE, POTASSIUM CHLORIDE, SODIUM LACTATE AND CALCIUM CHLORIDE: 600; 310; 30; 20 INJECTION, SOLUTION INTRAVENOUS at 12:44

## 2022-03-14 RX ADMIN — PANTOPRAZOLE SODIUM 40 MG: 40 INJECTION, POWDER, FOR SOLUTION INTRAVENOUS at 08:08

## 2022-03-14 RX ADMIN — LEVOTHYROXINE SODIUM 50 MCG: 0.05 TABLET ORAL at 05:35

## 2022-03-14 RX ADMIN — PETROLATUM: 420 OINTMENT TOPICAL at 10:26

## 2022-03-14 RX ADMIN — PIPERACILLIN SODIUM AND TAZOBACTAM SODIUM 3375 MG: 3; 375 INJECTION, POWDER, FOR SOLUTION INTRAVENOUS at 05:37

## 2022-03-14 RX ADMIN — ANTI-FUNGAL POWDER MICONAZOLE NITRATE TALC FREE: 1.42 POWDER TOPICAL at 09:32

## 2022-03-14 ASSESSMENT — PAIN SCALES - PAIN ASSESSMENT IN ADVANCED DEMENTIA (PAINAD)
TOTALSCORE: 0
BREATHING: 0
FACIALEXPRESSION: 0
CONSOLABILITY: 0
CONSOLABILITY: 0
TOTALSCORE: 0
BREATHING: 0
BODYLANGUAGE: 0
TOTALSCORE: 0
CONSOLABILITY: 0
BODYLANGUAGE: 0
FACIALEXPRESSION: 0
NEGVOCALIZATION: 0
BODYLANGUAGE: 0
TOTALSCORE: 0
FACIALEXPRESSION: 0
BODYLANGUAGE: 0
NEGVOCALIZATION: 0
NEGVOCALIZATION: 0
FACIALEXPRESSION: 0
CONSOLABILITY: 0
NEGVOCALIZATION: 0
BREATHING: 0
BREATHING: 0
FACIALEXPRESSION: 0
NEGVOCALIZATION: 0
BREATHING: 0
TOTALSCORE: 0
CONSOLABILITY: 0
BODYLANGUAGE: 0

## 2022-03-14 ASSESSMENT — PAIN SCALES - GENERAL
PAINLEVEL_OUTOF10: 0

## 2022-03-14 NOTE — PROGRESS NOTES
[facility]  Progress Note    NAME: Lord Galvan  MR:  [id number]  :   1945  DATE OF SERVICE:22    This is a face to face encounter with  Comment 67 y.o. female on 22    CHIEF COMPLAINT     ID following   HISTORY OF PRESENT ILLNESS   Pt seen and examined  22   Awake- in bed  PEG tube is not working  Patient had bloody stools this am.   She has been afebrile. Tachy     Son at bedside and updated. 3/13/2022  In bed working with pt  Son present and updated  Tmax 99.7 2L weakness    3/12/2022  PER STAFF PT HAD A FEVER XFUG309.8 4L  HER TF IS GOING THROUGH HER STOMA BAG   SHE HAS NO C/O F/C/N/V/D    3/11/2022  More awake and alert has no c/o   Afebrile 3L    3/10/2022  Seen postop EGD ESOPHAGOGASTRODUODENOSCOPY PEG TUBE INSERTION, exam under anesthesia, manual fecal disimpaction, control perianal hemorrhage, rectal packing     nad  Has pain     Patient is tolerating medications. No reported adverse drug reactions. REVIEW OF SYSTEMS     As stated above in the chief complaint, otherwise negative.   CURRENT MEDICATIONS      lidocaine PF  5 mL IntraDERmal Once    sodium chloride flush  5-40 mL IntraVENous 2 times per day    heparin flush  3 mL IntraVENous 2 times per day    levothyroxine  50 mcg Oral Daily    sertraline  100 mg Oral Daily    buPROPion  50 mg Oral Q6H    vancomycin  1,250 mg IntraVENous Q24H    QUEtiapine  25 mg Oral Nightly    pantoprazole  40 mg IntraVENous BID    miconazole   Topical BID    Petrolatum   Topical BID    piperacillin-tazobactam  3,375 mg IntraVENous Q8H    fluconazole  200 mg IntraVENous Q24H    [Held by provider] heparin (porcine)  5,000 Units SubCUTAneous 3 times per day     Continuous Infusions:   sodium chloride      IV infusion builder      lactated ringers 75 mL/hr at 22 1244    sodium chloride       PRN Meds:sodium chloride flush, sodium chloride, heparin flush, acetaminophen, Petrolatum **AND** Petrolatum, sodium chloride, morphine, ondansetron, albuterol    PHYSICAL EXAM     /71   Pulse 106   Temp 97.9 °F (36.6 °C) (Infrared)   Resp 20   Ht 4' 11\" (1.499 m)   Wt 110 lb 7 oz (50.1 kg)   SpO2 97%   BMI 22.31 kg/m²   Temp  Av.2 °F (36.8 °C)  Min: 97.9 °F (36.6 °C)  Max: 98.7 °F (37.1 °C)  Constitutional:  The patient is awake, alert  Pale- tube feeds on hold   Skin:    Warm and dry. HEENT:     AT/NC  Chest:   No use of accessory muscles to breathe. Symmetrical expansion. Cardiovascular:  S1 and S2 are rhythmic and regular. Abdomen:   Positive bowel sounds to auscultation. tender peg binder stoma with tf  Extremities:      edema. CNS    Awake working- no acute distress   Lines: piv  Carrera yellow CLEAR    DIAGNOSTIC RESULTS   Radiology:    Recent Labs     22  1412 22  03022  0832 22  0337 22  0818 22  1151   WBC 17.8*  --  17.9*  --   --  16.2*  --    RBC 3.19*  --  3.09*  --   --  3.05*  --    HGB 9.9*   < > 9.8*   < > 9.7* 9.7* 9.0*   HCT 30.3*   < > 29.6*   < > 30.1* 29.5* 27.8*   MCV 95.0  --  95.8  --   --  96.7  --    MCH 31.0  --  31.7  --   --  31.8  --    MCHC 32.7  --  33.1  --   --  32.9  --    RDW 14.6  --  14.4  --   --  14.5  --      --  299  --   --  342  --    MPV 10.2  --  10.4  --   --  10.5  --     < > = values in this interval not displayed.      Recent Labs     22  0822  0309 03/14/22  0818    137 141   K 4.4 4.2 3.5   CL 99 100 102   CO2 24 24 25   BUN 5* 7 10   CREATININE 0.4* 0.4* 0.5   GLUCOSE 126* 124* 105*   PROT 6.3* 6.2* 6.6   LABALBU 3.5 3.2* 3.3*   CALCIUM 9.8 9.8 10.3*   BILITOT 1.0 0.7 0.6   ALKPHOS 129* 153* 110*   AST 25 35* 27   ALT 31 28 26     Lab Results   Component Value Date    CRP 31.9 (H) 2022     Lab Results   Component Value Date    SEDRATE 109 (H) 2022     Recent Labs     22  0817 22  0309 22  0818   AST 25 35* 27   ALT 31 28 26       Microbiology:   Recent Labs     03/12/22  0958   COVID19 Not Detected     Lab Results   Component Value Date    BC 24 Hours no growth 03/12/2022    BC 5 Days no growth 03/08/2022    BLOODCULT2 24 Hours no growth 03/12/2022    BLOODCULT2 5 Days no growth 03/08/2022    ORG Staphylococcus aureus 03/08/2022    ORG Klebsiella oxytoca 07/01/2014     [facility] [address] 852        urine 2/16/2022       FINAL IMPRESSION     Admitted for Sepsis   On treatment for MRSA sepsis/uti KADEN neg   Peg infection s/p reinsertion new site  Gib/fecal impaction   leukocytosis reactive   FEVERS     Plan:   · Continue vancomycin for 4 weeks from 3/8/2022  · Pharmacy dosing   · Continue zosyn day #5  · Continue diflucan for now   · For LTAC-   · GI following    · Continue wound care   · Monitor labs- WBC- 16.2     Imaging and labs were reviewed per medical records. Thank you for involving me in the care of Sebastien Arora will continue to follow. Please do not hesitate to call for any questions or concerns. Electronically signed by JOHN Falk on 3/14/2022 at 3:32 PM   As above         This is a face to face encounter with Baylee Cade on 03/14/22. I have performed and participated in the history, exam, medical decision making, and  POC with the NURSE PRACTITIONER, JOHN Falk. pt off the floor being evaluated for GIB  Son present  Afebrile wbc16.2 cr0.5   Ct a/p pending   Blood cx ngtd  Cont atbx    Imaging and labs were reviewed. Thank you for involving me in the care of Baylee Mohamud. Please do not hesitate to call for any questions or concerns.     Electronically signed by Laurence Holloway MD on 3/14/2022 at 4:54 PM

## 2022-03-14 NOTE — PROGRESS NOTES
Dr. Irina Perera notified that contrast administer via PEG immediately drained into urostomy.  CT scan change to no contrast

## 2022-03-14 NOTE — PROGRESS NOTES
Pharmacy Consultation Note  (Antibiotic Dosing and Monitoring)    Initial consult date: 3/9/22  Consulting physician/provider: Dr. Jose Cash  Drug: Vancomycin  Indication: MRSA bacteremia    Age/  Gender Height Weight IBW  Allergy Information   76 y.o./female 4' 11\" (149.9 cm) 115 lb (52.2 kg)     Patient must be at least 60 in tall to calculate ideal body weight   Abilify [aripiprazole], Depakote [divalproex sodium], Dye [iodides], Geodon [ziprasidone hcl], Levsin [hyoscyamine], Lithium, Neurontin [gabapentin], Risperidone and related, Tegretol [carbamazepine], Tramadol, Trileptal [oxcarbazepine], and Zyprexa [olanzapine]      Renal Function:  Recent Labs     03/12/22  0817 03/13/22  0309 03/14/22  0818   BUN 5* 7 10   CREATININE 0.4* 0.4* 0.5       Intake/Output Summary (Last 24 hours) at 3/14/2022 1335  Last data filed at 3/14/2022 1026  Gross per 24 hour   Intake 1517.64 ml   Output 1100 ml   Net 417.64 ml       Vancomycin Monitoring:  Trough:    No results for input(s): VANCOTROUGH in the last 72 hours. Random:  No results for input(s): VANCORANDOM in the last 72 hours. Vancomycin Administration Times:  Recent vancomycin administrations                   vancomycin (VANCOCIN) 1,250 mg in dextrose 5 % 250 mL IVPB (mg) 1,250 mg New Bag 03/12/22 0528    vancomycin (VANCOCIN) 1,000 mg in dextrose 5 % 250 mL IVPB (mg) 1,000 mg New Bag 03/11/22 0331     1,000 mg New Bag 03/10/22 1840     1,000 mg New Bag  0224     1,000 mg New Bag 03/09/22 1636                  Assessment:  · Patient is a 67 y.o. female who has been initiated on vancomycin for MRSA bacteremia per chart review. The positive cultures are from an outside facility that were reported, so has not been treated. Repeat blood cultures have been collected here and show NGTD.   · CrCl ~40-50 mL/min  · To dose vancomycin, pharmacy will be utilizing [employer] calculation software for goal AUC/CLIFF 400-600 mg/L-hr   · 3/11: Level collected yesterday @ 7850 (instead of today @ 1430) = 13.2 mcg/mL, AUC/CLIFF 717    Plan:  · Continue Vancomycin 1,250 IV every 24 hours  · Repeat level on new regimen  · Will continue to monitor renal function   · Clinical pharmacy will continue to follow      Sandhya Young PharmD 3/14/2022 1:35 PM   [phone]

## 2022-03-14 NOTE — CARE COORDINATION
SOCIAL WORK / DISCHARGE PLANNING:  COVID neg 3/12. [facility]CERT started for [facility]. MARTY will need signed by physician. Sw met with son at bedside, answered questions about LTACs. He is agreeable with dc plan. He did ask about bedhold at [place] and if need to pack up any items. Sw did reach out to [facility] rep, pt has bedhold days and per building not concerned and room currently to remain the same. MARTY will need signed by physician.                 Electronically signed by NIRANJAN Holliday on 3/14/2022 at 10:43 AM

## 2022-03-14 NOTE — PROGRESS NOTES
Internal Medicine Progress Note    [facility]    Mina Dunn. Antoinette Pack., F.A.MAIOJillianI. Chantal Hamlin D.O., ESAU Navarro D.O. Zenobia John, MSN, APRN, NP-C  Roni Whittington. Corinne Anon, MSN, APRN-CNP     Primary Care Physician: Zach Cruz MD   Admitting Physician:  Harish Walls DO  Admission date and time: 3/8/2022  6:25 PM    Room:  [address] [place] [address]  Admitting diagnosis: Sepsis, unspecified organism [A41.9]  Sepsis [facility]) [A41.9]    Patient Name: Edy Boyer  MRN: [id number]    Date of Service: 3/14/2022     Subjective:  Beverley Payton is a 67 y.o. female who was seen and examined today,3/14/2022, at the bedside. Beverley Payton seems to be resting comfortably and was evaluated in the presence of her son today. Unfortunately, she is having increasing output from the previous PEG tube site and seems to be losing as much tube feeds as are going in. We will discuss this with the surgical team.  Multiple questions were answered at the bedside. We discussed discharge planning to an [place] [id number] facility. Review of System:   Constitutional:   Persistent malaise and fatigue. HEENT:   Denies ear pain, sore throat, sinus or eye problems. Nasal cannula oxygen is in place. Cardiovascular:   Denies any chest pain, irregular heartbeats, or palpitations. Respiratory:   Denies shortness of breath, coughing, sputum production, hemoptysis, or wheezing. Gastrointestinal:   Admits to mild postoperative abdominal pain. PEG tube is in place and functioning appropriately. Ostomy collection bag is covering previous PEG tube site with rather significant output  Genitourinary:    Denies any urgency, frequency, hematuria. Voiding with use of a Carrera catheter. Extremities:   Denies lower extremity swelling, edema or cyanosis. Neurology:    Chronic debility with overall weakness. Psch:   Denies being anxious or depressed. Musculoskeletal:    Denies  myalgias, joint complaints or back pain. Integumentary:   Denies any rashes, ulcers, or excoriations. Denies bruising. Hematologic/Lymphatic:  Denies bruising or bleeding. Physical Exam:  No intake/output data recorded. Intake/Output Summary (Last 24 hours) at 3/14/2022 1022  Last data filed at 3/14/2022 0522  Gross per 24 hour   Intake 867.33 ml   Output 1550 ml   Net -682.67 ml   I/O last 3 completed shifts: In: 927.3 [NG/GT:420; IV Piggyback:507.3]  Out: 3450 [Urine:2100; Emesis/NG output:1000; Stool:350]  Patient Vitals for the past 96 hrs (Last 3 readings):   Weight   03/14/22 0000 110 lb 7 oz (50.1 kg)   03/13/22 0141 114 lb 5 oz (51.9 kg)   03/12/22 0005 118 lb 14.4 oz (53.9 kg)     Vital Signs:   Blood pressure 128/65, pulse 105, temperature 98.7 °F (37.1 °C), temperature source Axillary, resp. rate 18, height 4' 11\" (1.499 m), weight 110 lb 7 oz (50.1 kg), SpO2 (!) 87 %. General appearance:  More awake and alert than on my last examination. Head:  Normocephalic. No masses, lesions or tenderness. Eyes:  PERRLA. EOMI. Sclera clear. ENT:  Ears normal. Mucosa normal.  Neck:    Supple. Trachea midline. No thyromegaly. No JVD. No bruits. Heart:    Rhythm regular. Rate controlled. No murmurs. Lungs:    Diminished with bibasilar rales. Abdomen:   PEG tube is in place and functioning appropriately. Ostomy is in place covering previous PEG tube site with rather significant output. Extremities:    Peripheral pulses present. No peripheral edema. No ulcers. No cyanosis. No clubbing. Neurologic:    Awake and alert. She answers questions. She is somewhat slow to respond. She is very weak and deconditioned. Psych:   Behavior is normal. Mood appears normal. Speech is not rapid and/or pressured. Musculoskeletal:   Spine ROM normal. Muscular strength intact. Gait not assessed. Integumentary:  Excoriation under the breasts- yeast.   Genitalia/Breast:  Voiding with use of a Carrera catheter.     Medication:  Scheduled Meds:   lidocaine PF  5 mL IntraDERmal Once    sodium chloride flush  5-40 mL IntraVENous 2 times per day    heparin flush  3 mL IntraVENous 2 times per day    levothyroxine  50 mcg Oral Daily    sertraline  100 mg Oral Daily    buPROPion  50 mg Oral Q6H    vancomycin  1,250 mg IntraVENous Q24H    QUEtiapine  25 mg Oral Nightly    pantoprazole  40 mg IntraVENous BID    miconazole   Topical BID    Petrolatum   Topical BID    piperacillin-tazobactam  3,375 mg IntraVENous Q8H    fluconazole  200 mg IntraVENous Q24H    heparin (porcine)  5,000 Units SubCUTAneous 3 times per day     Continuous Infusions:   sodium chloride      sodium chloride         Objective Data:  CBC with Differential:    Lab Results   Component Value Date    WBC 16.2 03/14/2022    RBC 3.05 03/14/2022    HGB 9.7 03/14/2022    HCT 29.5 03/14/2022     03/14/2022    MCV 96.7 03/14/2022    MCH 31.8 03/14/2022    MCHC 32.9 03/14/2022    RDW 14.5 03/14/2022    NRBC 0.9 03/09/2022    METASPCT 2.0 03/12/2022    LYMPHOPCT 14.3 03/14/2022    MONOPCT 6.7 03/14/2022    MYELOPCT 2.0 03/12/2022    BASOPCT 0.4 03/14/2022    MONOSABS 1.08 03/14/2022    LYMPHSABS 2.32 03/14/2022    EOSABS 0.84 03/14/2022    BASOSABS 0.06 03/14/2022     CMP:    Lab Results   Component Value Date     03/14/2022    K 3.5 03/14/2022     03/14/2022    CO2 25 03/14/2022    BUN 10 03/14/2022    CREATININE 0.5 03/14/2022    GFRAA >60 03/14/2022    LABGLOM >60 03/14/2022    GLUCOSE 105 03/14/2022    PROT 6.6 03/14/2022    LABALBU 3.3 03/14/2022    CALCIUM 10.3 03/14/2022    BILITOT 0.6 03/14/2022    ALKPHOS 110 03/14/2022    AST 27 03/14/2022    ALT 26 03/14/2022       Wound Documentation:   Wound 03/10/22 Abdomen Medial;Upper (Active)   Dressing Status Other (Comment) 03/10/22 1319   Drainage Amount Scant 03/10/22 1319   Number of days: 0       Assessment:  1.  Sepsis in the setting of dislodged PEG tube with developing abscess and intra-abdominal infection status post new PEG tube insertion  2. MRSA bacteremia  3. Fecal impaction with ongoing improvement  4. Anemia of chronic disease  5. Paroxysmal atrial fibrillation  6. Pubic rami fractures  7. Intertrigo under the breasts bilaterally   8. Failure to thrive with severe protein calorie malnutrition  9. Neuromuscular disorder of undetermined etiology with known psychiatric dysfunction as per the chart  10. Hypothyroidism  11. Essential hypertension  12. Hyperlipidemia      Plan:   The concern is that the patient is losing as much tube feed from the previous PEG tube site as she is absorbing. I will reach out to the surgical team to discuss this today. She is maintained on antibiotic therapy as per the infectious disease team and will undergo PICC line placement today. At the request of the patient's son, we will assess an x-ray of the neck. Otherwise, she will continue to work with the therapy teams. She is receiving physical therapy and aggressive wound care. Our recommendations are for LTAC placement and we are awaiting precertification. More than 50% of my  time was spent at the bedside counseling/coordinating care with the patient and/or family with face to face contact. This time was spent reviewing notes and laboratory data as well as instructing and counseling the patient. Time I spent with the family or surrogate(s) is included only if the patient was incapable of providing the necessary information or participating in medical decisions. I also discussed the differential diagnosis and all of the proposed management plans with the patient and individuals accompanying the patient. Dagoberto Dueñas requires this high level of physician care and nursing on the [facility] unit due the complexity of decision management and chance of rapid decline or death. Continued cardiac monitoring and higher level of nursing are required. I am readily available for any further decision-making and intervention.      Power Matias DO, D.O., Merline Fennel  10:22 AM  3/14/2022

## 2022-03-14 NOTE — PLAN OF CARE
I usually do not give refills on bactroban as this is used like a PO antibiotic--if they feel the infection is bad enough to warrant antibiotics, I'd encourage OV. I also don't advise bactroban to be used this close to the mouth.    She can start with OTC vaseline, chapstick   Problem: Falls - Risk of:  Goal: Will remain free from falls  Description: Will remain free from falls  3/14/2022 0916 by Talya Dow RN  Outcome: Met This Shift  3/14/2022 0520 by Norris Reid RN  Outcome: Ongoing  Goal: Absence of physical injury  Description: Absence of physical injury  3/14/2022 0916 by Talya Dow RN  Outcome: Met This Shift  3/14/2022 0520 by Norris Reid RN  Outcome: Ongoing     Problem: Skin Integrity:  Goal: Will show no infection signs and symptoms  Description: Will show no infection signs and symptoms  3/14/2022 0916 by Talya Dow RN  Outcome: Met This Shift  3/14/2022 0520 by Norris Reid RN  Outcome: Ongoing  Goal: Absence of new skin breakdown  Description: Absence of new skin breakdown  3/14/2022 0916 by Talya Dow RN  Outcome: Met This Shift  3/14/2022 0520 by Norris Reid RN  Outcome: Ongoing     Problem: Pain:  Goal: Pain level will decrease  Description: Pain level will decrease  3/14/2022 0916 by Talya Dow RN  Outcome: Met This Shift  3/14/2022 0520 by Norris Reid RN  Outcome: Ongoing  Goal: Control of acute pain  Description: Control of acute pain  3/14/2022 0916 by Talya Dow RN  Outcome: Met This Shift  3/14/2022 0520 by Norris Reid RN  Outcome: Ongoing  Goal: Control of chronic pain  Description: Control of chronic pain  3/14/2022 0916 by Talya Dow RN  Outcome: Met This Shift  3/14/2022 0520 by Norris Reid RN  Outcome: Ongoing

## 2022-03-14 NOTE — PROGRESS NOTES
Large amount of rectal bleeding noted with large clots noted.  Dr. Sandra Mcgill notified and Eleonora Slider place new orders

## 2022-03-14 NOTE — PROGRESS NOTES
Dr. Stella King notified of drop in hgb- stated to notify GI- paged via answering service at this time

## 2022-03-15 ENCOUNTER — ANESTHESIA (OUTPATIENT)
Dept: ENDOSCOPY | Age: 77
DRG: 862 | End: 2022-03-15
Payer: COMMERCIAL

## 2022-03-15 ENCOUNTER — ANESTHESIA EVENT (OUTPATIENT)
Dept: ENDOSCOPY | Age: 77
DRG: 862 | End: 2022-03-15
Payer: COMMERCIAL

## 2022-03-15 VITALS
DIASTOLIC BLOOD PRESSURE: 48 MMHG | RESPIRATION RATE: 14 BRPM | OXYGEN SATURATION: 98 % | SYSTOLIC BLOOD PRESSURE: 107 MMHG

## 2022-03-15 LAB
ALBUMIN SERPL-MCNC: 3 G/DL (ref 3.5–5.2)
ALP BLD-CCNC: 118 U/L (ref 35–104)
ALT SERPL-CCNC: 23 U/L (ref 0–32)
ANION GAP SERPL CALCULATED.3IONS-SCNC: 11 MMOL/L (ref 7–16)
AST SERPL-CCNC: 40 U/L (ref 0–31)
BASOPHILS ABSOLUTE: 0.03 E9/L (ref 0–0.2)
BASOPHILS RELATIVE PERCENT: 0.2 % (ref 0–2)
BILIRUB SERPL-MCNC: 0.7 MG/DL (ref 0–1.2)
BLOOD BANK DISPENSE STATUS: NORMAL
BLOOD BANK PRODUCT CODE: NORMAL
BPU ID: NORMAL
BUN BLDV-MCNC: 9 MG/DL (ref 6–23)
CALCIUM SERPL-MCNC: 9.8 MG/DL (ref 8.6–10.2)
CHLORIDE BLD-SCNC: 105 MMOL/L (ref 98–107)
CO2: 27 MMOL/L (ref 22–29)
CREAT SERPL-MCNC: 0.6 MG/DL (ref 0.5–1)
DESCRIPTION BLOOD BANK: NORMAL
EOSINOPHILS ABSOLUTE: 0.7 E9/L (ref 0.05–0.5)
EOSINOPHILS RELATIVE PERCENT: 5.8 % (ref 0–6)
GFR AFRICAN AMERICAN: >60
GFR NON-AFRICAN AMERICAN: >60 ML/MIN/1.73
GLUCOSE BLD-MCNC: 94 MG/DL (ref 74–99)
HCT VFR BLD CALC: 21.8 % (ref 34–48)
HCT VFR BLD CALC: 23.9 % (ref 34–48)
HCT VFR BLD CALC: 28.1 % (ref 34–48)
HEMOGLOBIN: 7 G/DL (ref 11.5–15.5)
HEMOGLOBIN: 7.6 G/DL (ref 11.5–15.5)
HEMOGLOBIN: 9.2 G/DL (ref 11.5–15.5)
IMMATURE GRANULOCYTES #: 0.27 E9/L
IMMATURE GRANULOCYTES %: 2.2 % (ref 0–5)
LYMPHOCYTES ABSOLUTE: 1.55 E9/L (ref 1.5–4)
LYMPHOCYTES RELATIVE PERCENT: 12.9 % (ref 20–42)
MCH RBC QN AUTO: 31.8 PG (ref 26–35)
MCHC RBC AUTO-ENTMCNC: 32.1 % (ref 32–34.5)
MCV RBC AUTO: 99.1 FL (ref 80–99.9)
METER GLUCOSE: 85 MG/DL (ref 74–99)
METER GLUCOSE: 88 MG/DL (ref 74–99)
MONOCYTES ABSOLUTE: 0.89 E9/L (ref 0.1–0.95)
MONOCYTES RELATIVE PERCENT: 7.4 % (ref 2–12)
NEUTROPHILS ABSOLUTE: 8.6 E9/L (ref 1.8–7.3)
NEUTROPHILS RELATIVE PERCENT: 71.5 % (ref 43–80)
PDW BLD-RTO: 14.6 FL (ref 11.5–15)
PLATELET # BLD: 329 E9/L (ref 130–450)
PMV BLD AUTO: 10.6 FL (ref 7–12)
POTASSIUM SERPL-SCNC: 3.3 MMOL/L (ref 3.5–5)
RBC # BLD: 2.2 E12/L (ref 3.5–5.5)
SODIUM BLD-SCNC: 143 MMOL/L (ref 132–146)
TOTAL PROTEIN: 6 G/DL (ref 6.4–8.3)
WBC # BLD: 12 E9/L (ref 4.5–11.5)

## 2022-03-15 PROCEDURE — 2580000003 HC RX 258: Performed by: INTERNAL MEDICINE

## 2022-03-15 PROCEDURE — 3609008400 HC SIGMOIDOSCOPY DIAGNOSTIC: Performed by: INTERNAL MEDICINE

## 2022-03-15 PROCEDURE — 85025 COMPLETE CBC W/AUTO DIFF WBC: CPT

## 2022-03-15 PROCEDURE — 7100000011 HC PHASE II RECOVERY - ADDTL 15 MIN: Performed by: INTERNAL MEDICINE

## 2022-03-15 PROCEDURE — 6360000002 HC RX W HCPCS: Performed by: NURSE ANESTHETIST, CERTIFIED REGISTERED

## 2022-03-15 PROCEDURE — 2580000003 HC RX 258: Performed by: NURSE PRACTITIONER

## 2022-03-15 PROCEDURE — 0W3P8ZZ CONTROL BLEEDING IN GASTROINTESTINAL TRACT, VIA NATURAL OR ARTIFICIAL OPENING ENDOSCOPIC: ICD-10-PCS | Performed by: INTERNAL MEDICINE

## 2022-03-15 PROCEDURE — 2580000003 HC RX 258: Performed by: SURGERY

## 2022-03-15 PROCEDURE — 76937 US GUIDE VASCULAR ACCESS: CPT

## 2022-03-15 PROCEDURE — 3700000000 HC ANESTHESIA ATTENDED CARE: Performed by: INTERNAL MEDICINE

## 2022-03-15 PROCEDURE — 36430 TRANSFUSION BLD/BLD COMPNT: CPT

## 2022-03-15 PROCEDURE — 02HV33Z INSERTION OF INFUSION DEVICE INTO SUPERIOR VENA CAVA, PERCUTANEOUS APPROACH: ICD-10-PCS | Performed by: INTERNAL MEDICINE

## 2022-03-15 PROCEDURE — 36569 INSJ PICC 5 YR+ W/O IMAGING: CPT

## 2022-03-15 PROCEDURE — 85014 HEMATOCRIT: CPT

## 2022-03-15 PROCEDURE — 2060000000 HC ICU INTERMEDIATE R&B

## 2022-03-15 PROCEDURE — 2720000010 HC SURG SUPPLY STERILE

## 2022-03-15 PROCEDURE — 2720000010 HC SURG SUPPLY STERILE: Performed by: INTERNAL MEDICINE

## 2022-03-15 PROCEDURE — 6360000002 HC RX W HCPCS: Performed by: SURGERY

## 2022-03-15 PROCEDURE — 2580000003 HC RX 258: Performed by: NURSE ANESTHETIST, CERTIFIED REGISTERED

## 2022-03-15 PROCEDURE — 6360000002 HC RX W HCPCS: Performed by: INTERNAL MEDICINE

## 2022-03-15 PROCEDURE — 85018 HEMOGLOBIN: CPT

## 2022-03-15 PROCEDURE — 80053 COMPREHEN METABOLIC PANEL: CPT

## 2022-03-15 PROCEDURE — C9113 INJ PANTOPRAZOLE SODIUM, VIA: HCPCS | Performed by: SURGERY

## 2022-03-15 PROCEDURE — 82962 GLUCOSE BLOOD TEST: CPT

## 2022-03-15 PROCEDURE — 6370000000 HC RX 637 (ALT 250 FOR IP): Performed by: SURGERY

## 2022-03-15 PROCEDURE — 2500000003 HC RX 250 WO HCPCS: Performed by: INTERNAL MEDICINE

## 2022-03-15 PROCEDURE — 36415 COLL VENOUS BLD VENIPUNCTURE: CPT

## 2022-03-15 PROCEDURE — 2709999900 HC NON-CHARGEABLE SUPPLY: Performed by: INTERNAL MEDICINE

## 2022-03-15 PROCEDURE — 3700000001 HC ADD 15 MINUTES (ANESTHESIA): Performed by: INTERNAL MEDICINE

## 2022-03-15 PROCEDURE — C1751 CATH, INF, PER/CENT/MIDLINE: HCPCS

## 2022-03-15 PROCEDURE — 7100000010 HC PHASE II RECOVERY - FIRST 15 MIN: Performed by: INTERNAL MEDICINE

## 2022-03-15 PROCEDURE — 2700000000 HC OXYGEN THERAPY PER DAY

## 2022-03-15 RX ORDER — MEPERIDINE HYDROCHLORIDE 25 MG/ML
12.5 INJECTION INTRAMUSCULAR; INTRAVENOUS; SUBCUTANEOUS EVERY 5 MIN PRN
Status: DISCONTINUED | OUTPATIENT
Start: 2022-03-15 | End: 2022-03-15 | Stop reason: HOSPADM

## 2022-03-15 RX ORDER — POTASSIUM CHLORIDE 29.8 MG/ML
20 INJECTION INTRAVENOUS PRN
Status: DISCONTINUED | OUTPATIENT
Start: 2022-03-15 | End: 2022-03-20

## 2022-03-15 RX ORDER — FENTANYL CITRATE 50 UG/ML
25 INJECTION, SOLUTION INTRAMUSCULAR; INTRAVENOUS EVERY 5 MIN PRN
Status: DISCONTINUED | OUTPATIENT
Start: 2022-03-15 | End: 2022-03-15 | Stop reason: HOSPADM

## 2022-03-15 RX ORDER — SODIUM CHLORIDE, SODIUM LACTATE, POTASSIUM CHLORIDE, CALCIUM CHLORIDE 600; 310; 30; 20 MG/100ML; MG/100ML; MG/100ML; MG/100ML
INJECTION, SOLUTION INTRAVENOUS CONTINUOUS PRN
Status: DISCONTINUED | OUTPATIENT
Start: 2022-03-15 | End: 2022-03-15 | Stop reason: SDUPTHER

## 2022-03-15 RX ORDER — PROPOFOL 10 MG/ML
INJECTION, EMULSION INTRAVENOUS PRN
Status: DISCONTINUED | OUTPATIENT
Start: 2022-03-15 | End: 2022-03-15 | Stop reason: SDUPTHER

## 2022-03-15 RX ORDER — SODIUM CHLORIDE 0.9 % (FLUSH) 0.9 %
5-40 SYRINGE (ML) INJECTION PRN
Status: DISCONTINUED | OUTPATIENT
Start: 2022-03-15 | End: 2022-03-15 | Stop reason: HOSPADM

## 2022-03-15 RX ORDER — PROPOFOL 10 MG/ML
INJECTION, EMULSION INTRAVENOUS CONTINUOUS PRN
Status: DISCONTINUED | OUTPATIENT
Start: 2022-03-15 | End: 2022-03-15 | Stop reason: SDUPTHER

## 2022-03-15 RX ORDER — SODIUM CHLORIDE 9 MG/ML
25 INJECTION, SOLUTION INTRAVENOUS PRN
Status: DISCONTINUED | OUTPATIENT
Start: 2022-03-15 | End: 2022-03-15 | Stop reason: HOSPADM

## 2022-03-15 RX ORDER — DIPHENHYDRAMINE HYDROCHLORIDE 50 MG/ML
12.5 INJECTION INTRAMUSCULAR; INTRAVENOUS
Status: DISCONTINUED | OUTPATIENT
Start: 2022-03-15 | End: 2022-03-15 | Stop reason: HOSPADM

## 2022-03-15 RX ORDER — SODIUM CHLORIDE 0.9 % (FLUSH) 0.9 %
5-40 SYRINGE (ML) INJECTION EVERY 12 HOURS SCHEDULED
Status: DISCONTINUED | OUTPATIENT
Start: 2022-03-15 | End: 2022-03-15 | Stop reason: HOSPADM

## 2022-03-15 RX ORDER — SODIUM CHLORIDE 9 MG/ML
INJECTION, SOLUTION INTRAVENOUS PRN
Status: DISCONTINUED | OUTPATIENT
Start: 2022-03-15 | End: 2022-03-22 | Stop reason: HOSPADM

## 2022-03-15 RX ADMIN — Medication 10 ML: at 10:08

## 2022-03-15 RX ADMIN — PIPERACILLIN SODIUM AND TAZOBACTAM SODIUM 3375 MG: 3; 375 INJECTION, POWDER, FOR SOLUTION INTRAVENOUS at 16:39

## 2022-03-15 RX ADMIN — POTASSIUM CHLORIDE 20 MEQ: 29.8 INJECTION, SOLUTION INTRAVENOUS at 16:42

## 2022-03-15 RX ADMIN — VANCOMYCIN HYDROCHLORIDE 1250 MG: 10 INJECTION, POWDER, LYOPHILIZED, FOR SOLUTION INTRAVENOUS at 03:59

## 2022-03-15 RX ADMIN — PETROLATUM: 420 OINTMENT TOPICAL at 10:11

## 2022-03-15 RX ADMIN — FLUCONAZOLE IN SODIUM CHLORIDE 200 MG: 2 INJECTION, SOLUTION INTRAVENOUS at 10:25

## 2022-03-15 RX ADMIN — SODIUM CHLORIDE, POTASSIUM CHLORIDE, SODIUM LACTATE AND CALCIUM CHLORIDE: 600; 310; 30; 20 INJECTION, SOLUTION INTRAVENOUS at 03:52

## 2022-03-15 RX ADMIN — PROPOFOL 50 MG: 10 INJECTION, EMULSION INTRAVENOUS at 17:53

## 2022-03-15 RX ADMIN — PETROLATUM: 420 OINTMENT TOPICAL at 20:17

## 2022-03-15 RX ADMIN — ANTI-FUNGAL POWDER MICONAZOLE NITRATE TALC FREE: 1.42 POWDER TOPICAL at 20:16

## 2022-03-15 RX ADMIN — PROPOFOL 100 MCG/KG/MIN: 10 INJECTION, EMULSION INTRAVENOUS at 17:53

## 2022-03-15 RX ADMIN — PROPOFOL 10 MG: 10 INJECTION, EMULSION INTRAVENOUS at 17:55

## 2022-03-15 RX ADMIN — PIPERACILLIN SODIUM AND TAZOBACTAM SODIUM 3375 MG: 3; 375 INJECTION, POWDER, FOR SOLUTION INTRAVENOUS at 23:17

## 2022-03-15 RX ADMIN — PIPERACILLIN SODIUM AND TAZOBACTAM SODIUM 3375 MG: 3; 375 INJECTION, POWDER, FOR SOLUTION INTRAVENOUS at 07:47

## 2022-03-15 RX ADMIN — PANTOPRAZOLE SODIUM 40 MG: 40 INJECTION, POWDER, FOR SOLUTION INTRAVENOUS at 10:08

## 2022-03-15 RX ADMIN — HEPARIN 300 UNITS: 100 SYRINGE at 10:08

## 2022-03-15 RX ADMIN — SODIUM CHLORIDE, POTASSIUM CHLORIDE, SODIUM LACTATE AND CALCIUM CHLORIDE: 600; 310; 30; 20 INJECTION, SOLUTION INTRAVENOUS at 17:48

## 2022-03-15 RX ADMIN — POTASSIUM CHLORIDE: 2 INJECTION, SOLUTION, CONCENTRATE INTRAVENOUS at 20:24

## 2022-03-15 RX ADMIN — ANTI-FUNGAL POWDER MICONAZOLE NITRATE TALC FREE: 1.42 POWDER TOPICAL at 10:10

## 2022-03-15 RX ADMIN — Medication 10 ML: at 20:33

## 2022-03-15 RX ADMIN — PANTOPRAZOLE SODIUM 40 MG: 40 INJECTION, POWDER, FOR SOLUTION INTRAVENOUS at 20:02

## 2022-03-15 ASSESSMENT — PAIN SCALES - GENERAL
PAINLEVEL_OUTOF10: 0

## 2022-03-15 ASSESSMENT — LIFESTYLE VARIABLES: SMOKING_STATUS: 0

## 2022-03-15 NOTE — PROCEDURES
ELIANA power PICC Placement 3/15/2022    Product number: [id number]   Lot Number: [id number]   Consult: home abx   Ultrasound: yes   Left Brachial vein:                Upper Arm Circumference: 22cm    Size: 5.5fr    Exposed Length: 0    Internal Length: 37cm   Cut: 37cm   Vein Measurement: 0.5cm    Consent obtained  Risks and benefits explained  Time out prior to procedure  Tolerated well  Tip of cath in lower 1/3 SVC @ CAJ via VPS  Brisk blood return  Flushed well and capped  RN notified    Leonor Escalera RN  3/15/2022  9:51 AM

## 2022-03-15 NOTE — CARE COORDINATION
SOCIAL WORK / DISCHARGE PLANNING:  COVID neg 3/12. PRECERT remains pending for [facility]. Wound care to evaluate old PEG site, dark clotted bloody BMs, GI following. Picc line placed. IV vanco and Zosyn. MARTY will need signed by physician. Bed being held at [place], long term pt there.                        Electronically signed by NIRANJAN Holliday on 3/15/2022 at 10:05 AM

## 2022-03-15 NOTE — PROGRESS NOTES
[facility]  [facility]  Progress Note    NAME: Alona Branham  MR:  [id number]  :   1945  DATE OF SERVICE:03/15/22    This is a face to face encounter with Alona Branham 67 y.o. female on 03/15/22    CHIEF COMPLAINT     ID following   HISTORY OF PRESENT ILLNESS   Pt seen and examined  03/15/22   In bed pale falls asleep easily    3/14/2022  Awake- in bed  PEG tube is not working  Patient had bloody stools this am.   She has been afebrile. Tachy     Son at bedside and updated. 3/13/2022  In bed working with pt  Son present and updated  Tmax 99.7 2L weakness    3/12/2022  PER STAFF PT HAD A FEVER XHDR095.8 4L  HER TF IS GOING THROUGH HER STOMA BAG   SHE HAS NO C/O F/C/N/V/D    3/11/2022  More awake and alert has no c/o   Afebrile 3L    3/10/2022  Seen postop EGD ESOPHAGOGASTRODUODENOSCOPY PEG TUBE INSERTION, exam under anesthesia, manual fecal disimpaction, control perianal hemorrhage, rectal packing     nad  Has pain     Patient is tolerating medications. No reported adverse drug reactions. REVIEW OF SYSTEMS     As stated above in the chief complaint, otherwise negative.   CURRENT MEDICATIONS      lidocaine PF  5 mL IntraDERmal Once    sodium chloride flush  5-40 mL IntraVENous 2 times per day    heparin flush  3 mL IntraVENous 2 times per day    levothyroxine  50 mcg Oral Daily    sertraline  100 mg Oral Daily    buPROPion  50 mg Oral Q6H    vancomycin  1,250 mg IntraVENous Q24H    QUEtiapine  25 mg Oral Nightly    pantoprazole  40 mg IntraVENous BID    miconazole   Topical BID    Petrolatum   Topical BID    piperacillin-tazobactam  3,375 mg IntraVENous Q8H    fluconazole  200 mg IntraVENous Q24H    [Held by provider] heparin (porcine)  5,000 Units SubCUTAneous 3 times per day     Continuous Infusions:   sodium chloride      PN-Adult  3 IN 1 Central Line (Standard)      sodium chloride      sodium chloride      sodium chloride       PRN Meds:sodium chloride, sodium chloride flush, sodium chloride, heparin flush, sodium chloride, acetaminophen, Petrolatum **AND** Petrolatum, sodium chloride, morphine, ondansetron, albuterol    PHYSICAL EXAM     BP (!) 150/67   Pulse 104   Temp 98.1 °F (36.7 °C) (Infrared)   Resp 20   Ht 4' 11\" (1.499 m)   Wt 118 lb 12.8 oz (53.9 kg)   SpO2 96%   BMI 23.99 kg/m²   Temp  Av.2 °F (36.8 °C)  Min: 97.9 °F (36.6 °C)  Max: 98.8 °F (37.1 °C)  Constitutional:  The patient is arousable Pale-   Skin:    Warm and dry. HEENT:     AT/NC  Chest:   No use of accessory muscles to breathe. Symmetrical expansion. Dec bs 2l  Cardiovascular:  S1 and S2 are rhythmic and regular. Abdomen:   Positive bowel sounds to auscultation. tender peg binder stoma   Extremities:      Edema. Dec rom   CNS     no acute distress   Lines: piv  Carrera yellow CLEAR    DIAGNOSTIC RESULTS   Radiology:  3/14 ct a/p   1.  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 2.  No intra-abdominal or abdominal wall abscess. 3.  Stable pelvic fractures on the left. 4.  No evidence of intestinal obstruction or fecal impaction. 5.  Slight increase in size of small bilateral pleural effusions. 6.  Gastrostomy tube and urinary Carrera catheter appear to be in appropriate position  . Recent Labs     22  0309 22  0832 22  0818 22  1151 22  1945 03/15/22  0330 03/15/22  1025   WBC 17.9*  --  16.2*  --   --   --  12.0*   RBC 3.09*  --  3.05*  --   --   --  2.20*   HGB 9.8*   < > 9.7*   < > 8.0* 7.6* 7.0*   HCT 29.6*   < > 29.5*   < > 24.3* 23.9* 21.8*   MCV 95.8  --  96.7  --   --   --  99.1   MCH 31.7  --  31.8  --   --   --  31.8   MCHC 33.1  --  32.9  --   --   --  32.1   RDW 14.4  --  14.5  --   --   --  14.6     --  342  --   --   --  329   MPV 10.4  --  10.5  --   --   --  10.6    < > = values in this interval not displayed.      Recent Labs     22  0309 22  0818 03/15/22  1025    141 143   K 4.2 3.5 3.3*    102 105   CO2 24 25 27   BUN 7 10 9   CREATININE 0.4* 0.5 0.6   GLUCOSE 124* 105* 94   PROT 6.2* 6.6 6.0*   LABALBU 3.2* 3.3* 3.0*   CALCIUM 9.8 10.3* 9.8   BILITOT 0.7 0.6 0.7   ALKPHOS 153* 110* 118*   AST 35* 27 40*   ALT 28 26 23     Lab Results   Component Value Date    CRP 31.9 (H) 03/08/2022     Lab Results   Component Value Date    SEDRATE 109 (H) 03/08/2022     Recent Labs     03/13/22  0309 03/14/22  0818 03/15/22  1025   AST 35* 27 40*   ALT 28 26 23     Microbiology:   No results for input(s): COVID19 in the last 72 hours. Lab Results   Component Value Date    BC 24 Hours no growth 03/12/2022    BC 5 Days no growth 03/08/2022    BLOODCULT2 24 Hours no growth 03/12/2022    BLOODCULT2 5 Days no growth 03/08/2022    ORG Staphylococcus aureus 03/08/2022    ORG Klebsiella oxytoca 07/01/2014     Travessa Circe 852        urine 2/16/2022       FINAL IMPRESSION     Admitted for Sepsis   On treatment for MRSA sepsis/uti KADEN neg   Peg infection s/p reinsertion new site  Gib/fecal impaction   leukocytosis reactive better  FEVERS     Plan:   vancomycin (VANCOCIN) 1,250 mg in dextrose 5 % 250 mL IVPB, Q24H  miconazole (MICOTIN) 2 % powder, BID  piperacillin-tazobactam (ZOSYN) 3,375 mg in dextrose 5 % 50 mL IVPB extended infusion (mini-bag), Q8H  fluconazole (DIFLUCAN) in 0.9 % sodium chloride IVPB 200 mg, Q24H      · Continue vancomycin for 4 weeks from 3/8/2022  · Pharmacy dosing   · Continue zosyn day #6  · Continue diflucan for now   · For LTAC-   · GI following    · Continue wound care   · Monitor labs-    Imaging and labs were reviewed per medical records. Thank you for involving me in the care of Ramana Becker will continue to follow. Please do not hesitate to call for any questions or concerns.     Electronically signed by Johnna Multani MD on 3/15/2022 at 1:29 PM

## 2022-03-15 NOTE — PROGRESS NOTES
PROGRESS NOTE  By Pastor Sánchez M.D. [facility]  Dr. Kirby Peguero M.D.,  Dr. Chico Flynn M.D.,   Dr. Lauren Peoples D.O.,  Dr. Jose Santiago M.D.,  Dr. Mili Hernandez D.O.,  Rafael Hugo D.O. Ish Lopez  67 y.o.  female    SUBJECTIVE:  Patient denies abdominal pain. She denies nausea vomiting. No family at bedside. I was contacted by nursing yesterday afternoon in regards to recurrent lower gastrointestinal bleeding with decreasing H&H.  H&H has decreased from 8 to 7 g/dL    OBJECTIVE:    /62   Pulse 100   Temp 98.1 °F (36.7 °C) (Infrared)   Resp 20   Ht 4' 11\" (1.499 m)   Wt 118 lb 12.8 oz (53.9 kg)   SpO2 94%   BMI 23.99 kg/m²     General: NAD/elderly  female  HEENT: Anicteric sclera/moist oral mucosa  Neck: Supple with trachea midline  Chest: Symmetric excursion/nonlabored respirations  Cor: Regular/S1-S2  Abd.:PEG tube in place. Ostomy with ostomy bag with brown liquid material but no blood  Extr.:  Decreased muscle tone and bulk throughout  Skin: Warm and dry      DATA:    Monitor data reviewed -sinus tachycardia noted.     Stool (measured) : 0 mL  Lab Results   Component Value Date    WBC 12.0 03/15/2022    RBC 2.20 03/15/2022    HGB 7.0 03/15/2022    HCT 21.8 03/15/2022    MCV 99.1 03/15/2022    MCH 31.8 03/15/2022    MCHC 32.1 03/15/2022    RDW 14.6 03/15/2022     03/15/2022    MPV 10.6 03/15/2022     Lab Results   Component Value Date     03/15/2022    K 3.3 03/15/2022     03/15/2022    CO2 27 03/15/2022    BUN 9 03/15/2022    CREATININE 0.6 03/15/2022    CALCIUM 9.8 03/15/2022    PROT 6.0 03/15/2022    LABALBU 3.0 03/15/2022    BILITOT 0.7 03/15/2022    ALKPHOS 118 03/15/2022    AST 40 03/15/2022    ALT 23 03/15/2022     Lab Results   Component Value Date    LIPASE 29 06/23/2014     Lab Results   Component Value Date    AMYLASE 202 06/24/2014         ASSESSMENT/PLAN:  Patient Active Problem List   Diagnosis    Odontoid fracture ([address]    Hypothyroid    Multiple rib fractures    TMJ (temporomandibular joint syndrome)    Bipolar disorder (HCC)    Sciatica    Hiatal hernia    Abnormality of gait and mobility    Trigeminal neuralgia    Headache    Cervical neck pain with evidence of disc disease    Vomiting    Hypokalemia    Intra-abdominal infection    MRSA bacteremia    Paroxysmal atrial fibrillation (HCC)    Hypotension    Hypernatremia    Moderate protein-calorie malnutrition (HCC)    Sepsis (HCC)     1.  Anemia/rectal bleed  -Recurrent lower gastrointestinal bleeding with decreasing H&H  -Likely related to ulceration from fecal impaction  -Disimpaction/treatment per general surgery 3/10  -Consider repeat flexible sigmoidoscopy today as patient likely will not be able to prep for colonoscopy given leakage from previous PEG tube placement     2.  Elevated transaminase  -Nonobstructive liver profile  -Likely related to general medical condition/medication  -Monitor labs     3.  PEG tube   -Infected PEG tube site/insertion of new PEG  -Per general surgery         Kia Conley MD  3/15/2022  11:34 AM    NOTE:  This report was transcribed using voice recognition software. Every effort was made to ensure accuracy; however, inadvertent computerized transcription errors may be present.

## 2022-03-15 NOTE — OP NOTE
Operative Note      Patient: Karyn Rodriguez  YOB: 1945  MRN: [id number]    Date of Procedure: 3/15/2022    Pre-Op Diagnosis: ANEMIA    Post-Op Diagnosis: stercoral ulcer       Procedure(s):  ANAL PROCTO SIGMOIDOSCOPY FLEXIBLE    Surgeon(s):  Toby Parks MD    Assistant:   Surgical Assistant: Anastacia Kendrick RN    Anesthesia: Monitor Anesthesia Care    Estimated Blood Loss (mL): Minimal    Complications: None    Specimens:   * No specimens in log *    Implants:  * No implants in log *      Drains:   Gastrostomy/Enterostomy/Jejunostomy PEG-Jejunostomy LUQ (Active)   Drainage Appearance Yellow 03/12/22 2352   Site Description Healing;Reddened 03/12/22 0545   Drain Status Continuous 03/12/22 0545   Surrounding Skin Intact 03/13/22 0924   Dressing Status Clean;Dry; Intact; Changed 03/13/22 0924   Dressing Type Split gauze 03/13/22 0924   Dressing Change Due 03/13/22 03/12/22 0545   G-Tube Care Completed Yes 03/12/22 0545   Tube Feeding Standard with Fiber 03/13/22 0924   Rate/Schedule 35 mL/hr 03/13/22 0924   Tube Feeding Intake (mL) 240 ml 03/13/22 1440   Free Water Flush (mL) 120 mL 03/13/22 1440   Output (mL) 350 ml 03/14/22 0519   Tube Placement Verified Yes 03/12/22 0545   Residual Volume (ml) 0 ml 03/12/22 0545       Urostomy LUQ (Active)   Stoma  Assessment [place] 03/15/22 1000   Treatment Bag change;Site care 03/13/22 2000   Output (ml) 50 ml 03/15/22 0750       Urethral Catheter Non-latex 16 fr (Active)   Catheter Indications Need for fluid volume management of the critically ill patient in a critical care setting 03/15/22 1000   Site Assessment [place] 03/15/22 1000   Urine Color Yellow 03/15/22 1000   Urine Appearance Clear 03/15/22 1000   Output (mL) 400 mL 03/15/22 1429       Findings: stercoral ulcer; pigmanted spot    Detailed Description of Procedure:   Sigmoidoscopy note    Indication rectal bleeding      Sedation  MAC    Endoscope was advanced through anus to transverse colon      Preparation is good. Patient tolerated procedure well. Descending colon is normal  Sigmoid colon have few small widely scattered diverticula  Rectum direct views and Retroflexion in rectum shows stercoral ulcer, pigmented spots. No active bleeding. Washed and treatd with 14 W bipolar cautery     IMPRESSION AND PLAN: no blood in colon. stercoral ulcer with some stigmata; cauterized. Resume diet and avoid constipation.   Will follow    Electronically signed by Janessa Mazariegos MD on 3/15/2022 at 6:03 PM

## 2022-03-15 NOTE — PROGRESS NOTES
Internal Medicine Progress Note    [facility]    Stephanie Johnson. Manny Pete., F.JOAN.MAIOJillianI. Paulino Ríos D.O., STEVOOEARNEST Romero D.O. Luciano Anne, MSN, APRN, NP-C  Birdie Vázquez. Dariusz Patel, MSN, APRN-CNP     Primary Care Physician: Yoon Mccurdy MD   Admitting Physician:  Tanya Samson DO  Admission date and time: 3/8/2022  6:25 PM    Room:  [address] [place] [address]  Admitting diagnosis: Sepsis, unspecified organism [A41.9]  Sepsis [facility]) [A41.9]    Patient Name: Paige Rowland  MRN: [id number]    Date of Service: 3/15/2022     Subjective:  Napolean Bamberger is a 67 y.o. female who was seen and examined today,3/15/2022, at the bedside. Unfortunately, Napolean Bamberger continues to have significant output from the previous PEG tube site and CT confirms gastrocutaneous fistula as expected. Tube feeds have since been discontinued and we discussed TPN for nutritional support. Her son was updated at the bedside yesterday    Review of System:   Constitutional:   Persistent malaise and fatigue. HEENT:   Denies ear pain, sore throat, sinus or eye problems. Nasal cannula oxygen is in place. Cardiovascular:   Denies any chest pain, irregular heartbeats, or palpitations. Respiratory:   Denies shortness of breath, coughing, sputum production, hemoptysis, or wheezing. Gastrointestinal:   No substantial abdominal pain. PEG tube is in place. Ostomy collection bag is covering previous PEG tube site with persistent significant output  Genitourinary:    Denies any urgency, frequency, hematuria. Voiding with use of a Carrera catheter. Extremities:   Denies lower extremity swelling, edema or cyanosis. Neurology:    Chronic debility with overall weakness. Psch:   Denies being anxious or depressed. Musculoskeletal:    Denies  myalgias, joint complaints or back pain. Integumentary:   Denies any rashes, ulcers, or excoriations. Denies bruising.   Hematologic/Lymphatic:  Denies bruising or bleeding. Physical Exam:  I/O this shift:  In: 1211.6 [I.V.:208.7; IV Piggyback:1003]  Out: 450 [Urine:450]    Intake/Output Summary (Last 24 hours) at 3/15/2022 1116  Last data filed at 3/15/2022 0828  Gross per 24 hour   Intake 2914.81 ml   Output 1100 ml   Net 1814.81 ml   I/O last 3 completed shifts: In: 2404.9 [I.V.:1218.4; IV Piggyback:1186.5]  Out: 1500 [Urine:1150; Emesis/NG output:350]  Patient Vitals for the past 96 hrs (Last 3 readings):   Weight   03/15/22 0645 118 lb 12.8 oz (53.9 kg)   03/14/22 0000 110 lb 7 oz (50.1 kg)   03/13/22 0141 114 lb 5 oz (51.9 kg)     Vital Signs:   Blood pressure 135/62, pulse 100, temperature 98.1 °F (36.7 °C), temperature source Infrared, resp. rate 20, height 4' 11\" (1.499 m), weight 118 lb 12.8 oz (53.9 kg), SpO2 94 %. General appearance:  More awake and alert than on my last examination. Head:  Normocephalic. No masses, lesions or tenderness. Eyes:  PERRLA. EOMI. Sclera clear. ENT:  Ears normal. Mucosa normal.  Neck:    Supple. Trachea midline. No thyromegaly. No JVD. No bruits. Heart:    Rhythm regular. Rate controlled. No murmurs. Lungs:    Diminished with bibasilar rales. Abdomen:   PEG tube is in place and functioning appropriately. Ostomy is in place covering previous PEG tube site with rather significant output. Extremities:    Peripheral pulses present. No peripheral edema. No ulcers. No cyanosis. No clubbing. Neurologic:    Awake and alert. She answers questions. She is somewhat slow to respond. She is very weak and deconditioned. Psych:   Behavior is normal. Mood appears normal. Speech is not rapid and/or pressured. Musculoskeletal:   Spine ROM normal. Muscular strength intact. Gait not assessed. Integumentary:  Excoriation under the breasts- yeast.   Genitalia/Breast:  Voiding with use of a Carrera catheter.     Medication:  Scheduled Meds:   lidocaine PF  5 mL IntraDERmal Once    sodium chloride flush  5-40 mL IntraVENous 2 times per day    heparin flush  3 mL IntraVENous 2 times per day    levothyroxine  50 mcg Oral Daily    sertraline  100 mg Oral Daily    buPROPion  50 mg Oral Q6H    vancomycin  1,250 mg IntraVENous Q24H    QUEtiapine  25 mg Oral Nightly    pantoprazole  40 mg IntraVENous BID    miconazole   Topical BID    Petrolatum   Topical BID    piperacillin-tazobactam  3,375 mg IntraVENous Q8H    fluconazole  200 mg IntraVENous Q24H    [Held by provider] heparin (porcine)  5,000 Units SubCUTAneous 3 times per day     Continuous Infusions:   sodium chloride      PN-Adult  3 IN 1 Central Line (Standard)      sodium chloride      lactated ringers 75 mL/hr at 03/15/22 0750    sodium chloride      sodium chloride         Objective Data:  CBC with Differential:    Lab Results   Component Value Date    WBC 12.0 03/15/2022    RBC 2.20 03/15/2022    HGB 7.0 03/15/2022    HCT 21.8 03/15/2022     03/15/2022    MCV 99.1 03/15/2022    MCH 31.8 03/15/2022    MCHC 32.1 03/15/2022    RDW 14.6 03/15/2022    NRBC 0.9 03/09/2022    METASPCT 2.0 03/12/2022    LYMPHOPCT 12.9 03/15/2022    MONOPCT 7.4 03/15/2022    MYELOPCT 2.0 03/12/2022    BASOPCT 0.2 03/15/2022    MONOSABS 0.89 03/15/2022    LYMPHSABS 1.55 03/15/2022    EOSABS 0.70 03/15/2022    BASOSABS 0.03 03/15/2022     CMP:    Lab Results   Component Value Date     03/15/2022    K 3.3 03/15/2022     03/15/2022    CO2 27 03/15/2022    BUN 9 03/15/2022    CREATININE 0.6 03/15/2022    GFRAA >60 03/15/2022    LABGLOM >60 03/15/2022    GLUCOSE 94 03/15/2022    PROT 6.0 03/15/2022    LABALBU 3.0 03/15/2022    CALCIUM 9.8 03/15/2022    BILITOT 0.7 03/15/2022    ALKPHOS 118 03/15/2022    AST 40 03/15/2022    ALT 23 03/15/2022       Wound Documentation:   Wound 03/10/22 Abdomen Medial;Upper (Active)   Dressing Status Other (Comment) 03/10/22 1319   Drainage Amount Scant 03/10/22 1319   Number of days: 0       Assessment:  1.  Sepsis in the setting of dislodged PEG tube with developing abscess and intra-abdominal infection status post new PEG tube insertion  2. MRSA bacteremia  3. Fecal impaction with ongoing improvement  4. Anemia of chronic disease  5. Paroxysmal atrial fibrillation  6. Pubic rami fractures  7. Intertrigo under the breasts bilaterally   8. Failure to thrive with severe protein calorie malnutrition  9. Neuromuscular disorder of undetermined etiology with known psychiatric dysfunction as per the chart  10. Hypothyroidism  11. Essential hypertension  12. Hyperlipidemia      Plan:   Secondary to the persistent and substantial output from the previous PEG tube site in the setting of gastrocutaneous fistula, tube feeds have been discontinued. As a result, I am recommending TPN for nutritional support we will begin this with recommendations from a dietary team.  This needed makes LTAC even more necessary. The patient's son was updated at the bedside yesterday and is in agreement with this plan. Chronic comorbidities are being monitored. She continues to require substantial work with the therapy teams. More than 50% of my  time was spent at the bedside counseling/coordinating care with the patient and/or family with face to face contact. This time was spent reviewing notes and laboratory data as well as instructing and counseling the patient. Time I spent with the family or surrogate(s) is included only if the patient was incapable of providing the necessary information or participating in medical decisions. I also discussed the differential diagnosis and all of the proposed management plans with the patient and individuals accompanying the patient. Yetta Schilder requires this high level of physician care and nursing on the [facility] unit due the complexity of decision management and chance of rapid decline or death. Continued cardiac monitoring and higher level of nursing are required. I am readily available for any further decision-making and intervention. Og Shearer DO, D.O., [facility]  11:16 AM  3/15/2022

## 2022-03-15 NOTE — ANESTHESIA PRE PROCEDURE
Department of Anesthesiology  Preprocedure Note       Name:  Lorie Cordoba   Age:  67 y.o.  :  1945                                          MRN:  [id number]         Date:  3/15/2022      Surgeon: José Miguel Robertson):  Kenney Staley MD    Procedure: Procedure(s):  ANAL PROCTO SIGMOIDOSCOPY FLEXIBLE    Medications prior to admission:   Prior to Admission medications    Medication Sig Start Date End Date Taking? Authorizing Provider   vitamin D (CHOLECALCIFEROL) 25 MCG (1000 UT) TABS tablet Take 1,000 Units by mouth daily   Yes Historical Provider, MD   paliperidone (INVEGA) 1.5 MG extended release tablet Take 1.5 mg by mouth every morning   Yes Historical Provider, MD   sertraline (ZOLOFT) 100 MG tablet Take 100 mg by mouth daily   Yes Historical Provider, MD   buPROPion (WELLBUTRIN SR) 100 MG extended release tablet Take 100 mg by mouth 2 times daily   Yes Historical Provider, MD   melatonin 5 MG TBDP disintegrating tablet Take 5 mg by mouth nightly   Yes Historical Provider, MD   levothyroxine (SYNTHROID) 50 MCG tablet Take 50 mcg by mouth Daily. Yes Historical Provider, MD   acetaminophen (TYLENOL) 325 MG tablet Take 650 mg by mouth every 4 hours as needed for Pain or Fever.    Yes Historical Provider, MD   famotidine (PEPCID) 40 MG tablet Take 40 mg by mouth daily    Historical Provider, MD   cyanocobalamin 1000 MCG/ML injection Inject 1,000 mcg into the muscle once    Historical Provider, MD   fluconazole (DIFLUCAN) 200 MG tablet Take 200 mg by mouth daily    Historical Provider, MD       Current medications:    Current Facility-Administered Medications   Medication Dose Route Frequency Provider Last Rate Last Admin    0.9 % sodium chloride infusion   IntraVENous PRN Trinna Pennant, DO        PN-Adult  3 IN 1 Central Line (Custom)   IntraVENous Continuous TPN Trinna Pennant, DO        potassium chloride 20 mEq/50 mL IVPB (Central Line)  20 mEq IntraVENous PRN Trinna Pennant, DO        lidocaine PF 1 % injection 5 mL 5 mL IntraDERmal Once Allegra [place], DO        sodium chloride flush 0.9 % injection 5-40 mL  5-40 mL IntraVENous 2 times per day Allegra Claude, DO   10 mL at 03/15/22 1008    sodium chloride flush 0.9 % injection 5-40 mL  5-40 mL IntraVENous PRN Allegra [place], DO        0.9 % sodium chloride infusion  25 mL IntraVENous PRN Allegra Claude, DO        heparin flush 100 UNIT/ML injection 300 Units  3 mL IntraVENous 2 times per day Allegra Claude, DO   300 Units at 03/15/22 1008    heparin flush 100 UNIT/ML injection 300 Units  3 mL IntraCATHeter PRN Allegra [place], DO        0.9 % sodium chloride infusion   IntraVENous PRN Yahaira Valdovinos, DO        levothyroxine (SYNTHROID) tablet 50 mcg  50 mcg Oral Daily Allegra [place], DO   50 mcg at 03/14/22 0535    sertraline (ZOLOFT) tablet 100 mg  100 mg Oral Daily Allegra Claude, DO   100 mg at 03/14/22 0434    buPROPion (WELLBUTRIN) tablet 50 mg  50 mg Oral Q6H Allegra [place], DO   50 mg at 03/14/22 0811    vancomycin (VANCOCIN) 1,250 mg in dextrose 5 % 250 mL IVPB  1,250 mg IntraVENous Q24H Allegra Claude, DO   Stopped at 03/15/22 0555    QUEtiapine (SEROQUEL) tablet 25 mg  25 mg Oral Nightly Edson Lemus APRN - CNP   25 mg at 03/13/22 2000    acetaminophen (TYLENOL) tablet 650 mg  650 mg Oral Q4H PRN Yahaira Valdovinos, DO   650 mg at 03/13/22 1544    pantoprazole (PROTONIX) injection 40 mg  40 mg IntraVENous BID Halie Rosenbaum MD   40 mg at 03/15/22 1008    miconazole (MICOTIN) 2 % powder   Topical BID Halie Rosenbaum MD   Given at 03/15/22 1010    Petrolatum 42 % OINT   Topical BID Halie Rosenbaum MD   Given at 03/15/22 1011    And    Petrolatum 42 % OINT   Topical TID PRN Halie Rosenbaum MD        0.9 % sodium chloride infusion   IntraVENous PRN Halie Rosenbaum MD        piperacillin-tazobactam (ZOSYN) 3,375 mg in dextrose 5 % 50 mL IVPB extended infusion (mini-bag)  3,375 mg IntraVENous Q8H Halie Rosenbaum MD   Stopped at 03/15/22 1127    morphine (PF) injection 2 mg 2 mg IntraVENous Q4H PRN Jose Gimenez MD   2 mg at 03/12/22 1607    ondansetron (ZOFRAN) injection 4 mg  4 mg IntraVENous Q6H PRN Jose Gimenez MD        fluconazole (DIFLUCAN) in 0.9 % sodium chloride IVPB 200 mg  200 mg IntraVENous Q24H Jose Gimenez MD   Stopped at 03/15/22 1127    albuterol (PROVENTIL) nebulizer solution 2.5 mg  2.5 mg Nebulization Q6H PRN Jose Gimenez MD       [facility] [place] by provider] heparin (porcine) injection 5,000 Units  5,000 Units SubCUTAneous 3 times per day Ileana Llamas DO   5,000 Units at 03/14/22 0535       Allergies: Allergies   Allergen Reactions    Abilify [Aripiprazole]     Depakote [Divalproex Sodium]     Dye [Iodides] Hives     Pt. Is allergic to CT dye. Breaks out in hive.s  Pt. Is allergic to CT dye. Breaks out in hives. Pt. Gets pre-medicated with benadryl prior to CT.         Geodon [Ziprasidone Hcl]     Levsin [Hyoscyamine]     Lithium     Neurontin [Gabapentin]     Risperidone And Related     Tegretol [Carbamazepine]     Tramadol     Trileptal [Oxcarbazepine]     Zyprexa [Olanzapine]        Problem List:    Patient Active Problem List   Diagnosis Code    Odontoid fracture ([facility] [address] S12.110A    Hypothyroid E03.9    Multiple rib fractures S22.49XA    TMJ (temporomandibular joint syndrome) M26.609    Bipolar disorder ([facility] [address] F31.9    Sciatica M54.30    Hiatal hernia K44.9    Abnormality of gait and mobility R26.9    Trigeminal neuralgia G50.0    Headache R51.9    Cervical neck pain with evidence of disc disease M50.90    Vomiting R11.10    Hypokalemia E87.6    Intra-abdominal infection B99.9    MRSA bacteremia R78.81, B95.62    Paroxysmal atrial fibrillation (HCC) I48.0    Hypotension I95.9    Hypernatremia E87.0    Moderate protein-calorie malnutrition (HCC) E44.0    Sepsis (HCC) A41.9       Past Medical History:        Diagnosis Date    Allergic     Anxiety disorder     Arthritis     Difficulty walking     Dysphagia     Hyperlipidemia     Hypertension     Interstitial cystitis     Lack of coordination     Mitral valve prolapse     pt. has a mitral valve prolapse    Neuromuscular disorder (HCC)     Other disorders of kidney and ureter in diseases classified elsewhere     Pelvis fracture (HCC)     multiple fxs pelvis    Psychiatric problem     Thyroid disease     Trigeminal neuralgia     Unspecified dementia without behavioral disturbance (HCC)        Past Surgical History:        Procedure Laterality Date    FRACTURE SURGERY      GASTROSTOMY TUBE PLACEMENT N/A 3/3/2022    EGD PEG TUBE PLACEMENT performed by Nathanael Ramires MD at [address]     TRANSESOPHAGEAL ECHOCARDIOGRAM N/A 3/11/2022    TRANSESOPHAGEAL ECHOCARDIOGRAM performed by Doreen Francis MD at [address] [facility] N/A 3/10/2022    EGD ESOPHAGOGASTRODUODENOSCOPY PEG TUBE INSERTION; EVACUATION OF STOOL IMPACTON performed by Reny Tran MD at [address] History:    Social History     Tobacco Use    Smoking status: Never Smoker    Smokeless tobacco: Not on file   Substance Use Topics    Alcohol use: No                                Counseling given: Not Answered      Vital Signs (Current):   Vitals:    03/15/22 1225 03/15/22 1230 03/15/22 1350 03/15/22 1550   BP:  (!) 150/67 136/63 133/63   Pulse:  104 94 93   Resp:  20 18 20   Temp:  98.1 °F (36.7 °C) 98.1 °F (36.7 °C) 98.2 °F (36.8 °C)   TempSrc:  Infrared Infrared Infrared   SpO2:  96% 94% 97%   Weight:       Height: 4' 11\" (1.499 m)                                                 BP Readings from Last 3 Encounters:   03/15/22 133/63   03/11/22 (!) 104/44   03/10/22 112/76       NPO Status:                                                                                 BMI:   Wt Readings from Last 3 Encounters:   03/15/22 118 lb 12.8 oz (53.9 kg)   03/03/22 104 lb (47.2 kg)     Body mass index is 23.99 kg/m².     CBC: Lab Results   Component Value Date    WBC 12.0 03/15/2022    RBC 2.20 03/15/2022    HGB 7.0 03/15/2022    HCT 21.8 03/15/2022    MCV 99.1 03/15/2022    RDW 14.6 03/15/2022     03/15/2022       CMP:   Lab Results   Component Value Date     03/15/2022    K 3.3 03/15/2022     03/15/2022    CO2 27 03/15/2022    BUN 9 03/15/2022    CREATININE 0.6 03/15/2022    GFRAA >60 03/15/2022    LABGLOM >60 03/15/2022    GLUCOSE 94 03/15/2022    PROT 6.0 03/15/2022    CALCIUM 9.8 03/15/2022    BILITOT 0.7 03/15/2022    ALKPHOS 118 03/15/2022    AST 40 03/15/2022    ALT 23 03/15/2022       POC Tests: No results for input(s): POCGLU, POCNA, POCK, POCCL, POCBUN, POCHEMO, POCHCT in the last 72 hours.     Coags:   Lab Results   Component Value Date    PROTIME 15.8 03/09/2022    INR 1.4 03/09/2022       HCG (If Applicable): No results found for: PREGTESTUR, PREGSERUM, HCG, HCGQUANT     ABGs: No results found for: PHART, PO2ART, CCX4AQQ, BMX4NBH, BEART, W8DEYENT     Type & Screen (If Applicable):  No results found for: LABABO, LABRH    Drug/Infectious Status (If Applicable):  No results found for: HIV, HEPCAB    COVID-19 Screening (If Applicable):   Lab Results   Component Value Date    COVID19 Not Detected 03/12/2022    COVID19 Not Detected 04/09/2021           Anesthesia Evaluation  Patient summary reviewed and Nursing notes reviewed no history of anesthetic complications:   Airway: Mallampati: Unable to assess / NA       Comment: Essentially non compliant with airway exam   Dental:          Pulmonary:Negative Pulmonary ROS   (+) rhonchi,  decreased breath sounds,      (-) not a current smoker                           Cardiovascular:  Exercise tolerance: poor (<4 METS),   (+) hypertension: mild, valvular problems/murmurs: MVP, dysrhythmias (PAF): atrial fibrillation, murmur (Grade I),       ECG reviewed  Rhythm: irregular  Rate: normal  Echocardiogram reviewed         Beta Blocker:  Not on Beta Blocker      ROS comment: History of sepsis    EKG:  Sinus tachycardia  Otherwise normal ECG  No previous ECGs available    ECHO:  No previous echo for comparison. Technically adequate study. Left ventricular ejection fraction is grossly normal.  Structurally normal mitral valve. Mild mitral regurgitation is present. No vegetation noted on mitral valve. No tricuspid valve vegetation or masses visualized. No evidence of mass or vegetation noted on the aortic valve. Moderate , protruding , immobile plaque noted in the ascending aorta. Neuro/Psych:   (+) neuromuscular disease (Trigeminal neuralgia):, headaches: migraine headaches, psychiatric history (Bipolar):depression/anxiety              ROS comment: Gait dysfunction  Dementia - not currently oriented to person, place, or time GI/Hepatic/Renal:   (+) hiatal hernia, GERD: no interval change,          ROS comment: Vomiting  Interstitial cystitis  Dysphagia  Intra-abdominal infection  GC fistula s/p PEG. Endo/Other:    (+) hypothyroidism, blood dyscrasia (Hbg 7.8 & Hct 21.8): anemia, arthritis (Cervical neck pain): OA., electrolyte abnormalities (Hypokalemia (K+ 3.3 mmol/L) & Hypernatremia ), .          Pt had no PAT visit        ROS comment: Odontoid fracture  Multiple rib fractures  TMJ (temporomandibular joint syndrome)  History of multiple pelvic fractures Abdominal:   (+) scaphoid          Vascular: negative vascular ROS. Other Findings:           Anesthesia Plan      MAC     ASA 4     (History from chart review  Consent from POA)  Induction: intravenous. Anesthetic plan and risks discussed with patient. Plan discussed with EDGAR.                 Shannen Decker DO   3/15/2022

## 2022-03-15 NOTE — PROGRESS NOTES
OT SESSION ATTEMPT     Date:3/15/2022  Patient Name: Ugo Walton  MRN: [id number]  : 1945  Room: [address] [place] [address]     Attempted OT session this date:    [x] unavailable due to other medical staff currently with pt-Picc placement. [] unavailable per nursing staff recommendation    [] Unavailable per nursing staff secondary to lab / radiology results    [] pt declined due to ____. Benefits of participation in therapy reviewed with pt.    [] off unit   [] Other:     Will reattempt OT evaluation and/or treatment at a later time.     [facility] OTR/L; [id number]

## 2022-03-15 NOTE — ANESTHESIA POSTPROCEDURE EVALUATION
Department of Anesthesiology  Postprocedure Note    Patient: Baylee Mohamud  MRN: [id number]  YOB: 1945  Date of evaluation: 3/15/2022  Time:  6:29 PM     Procedure Summary     Date: 03/15/22 Room / [facility] ENDO 01 [facility] [address] [facility] [address]    Anesthesia Start: 8372 Anesthesia Stop: 1815    Procedure: SIGMOIDOSCOPY DIAGNOSTIC FLEXIBLE (N/A ) Diagnosis: (ANEMIA)    Surgeons: Dwight Watson MD Responsible Provider: Matty Taylor DO    Anesthesia Type: MAC ASA Status: 4          Anesthesia Type: MAC    River Phase I: River Score: 9    River Phase II: River Score: 9    Last vitals: Reviewed and per EMR flowsheets. Anesthesia Post Evaluation    Patient location during evaluation: bedside  Patient participation: complete - patient participated  Level of consciousness: responsive to verbal stimuli  Pain score: 1  Airway patency: patent  Nausea & Vomiting: no vomiting and no nausea  Complications: no  Cardiovascular status: hemodynamically stable  Respiratory status: acceptable  Hydration status: stable  Comments: Seen and examined. Back to baseline.

## 2022-03-15 NOTE — PROGRESS NOTES
Physical Therapy  Room #: 3229/3005-50  Date: 3/15/2022       Patient Name: Kasandra Koo  :       MRN: [id number]     Patient unavailable for physical therapy treatment due to unavailable/not appropriate per nursing due to giving blood and then going for scope. . Will attempt PT treatment at a later time. Thank you.        Diann Pérez, Student Physical Therapist

## 2022-03-15 NOTE — PROGRESS NOTES
Pharmacy Consultation Note  (Antibiotic Dosing and Monitoring)    Initial consult date: 3/9/22  Consulting physician/provider: Dr. Luis Angel Anguiano  Drug: Vancomycin  Indication: MRSA bacteremia    Age/  Gender Height Weight IBW  Allergy Information   76 y.o./female 4' 11\" (149.9 cm) 115 lb (52.2 kg)     Patient must be at least 60 in tall to calculate ideal body weight   Abilify [aripiprazole], Depakote [divalproex sodium], Dye [iodides], Geodon [ziprasidone hcl], Levsin [hyoscyamine], Lithium, Neurontin [gabapentin], Risperidone and related, Tegretol [carbamazepine], Tramadol, Trileptal [oxcarbazepine], and Zyprexa [olanzapine]      Renal Function:  Recent Labs     03/13/22  0309 03/14/22  0818 03/15/22  1025   BUN 7 10 9   CREATININE 0.4* 0.5 0.6       Intake/Output Summary (Last 24 hours) at 3/15/2022 1344  Last data filed at 3/15/2022 1209  Gross per 24 hour   Intake 3343.27 ml   Output 1100 ml   Net 2243.27 ml       Vancomycin Monitoring:  Trough:    No results for input(s): VANCOTROUGH in the last 72 hours. Random:  No results for input(s): VANCORANDOM in the last 72 hours. Vancomycin Administration Times:  Recent vancomycin administrations                   vancomycin (VANCOCIN) 1,250 mg in dextrose 5 % 250 mL IVPB (mg) 1,250 mg New Bag 03/12/22 0528    vancomycin (VANCOCIN) 1,000 mg in dextrose 5 % 250 mL IVPB (mg) 1,000 mg New Bag 03/11/22 0331     1,000 mg New Bag 03/10/22 1840     1,000 mg New Bag  0224     1,000 mg New Bag 03/09/22 1636                  Assessment:  · Patient is a 67 y.o. female who has been initiated on vancomycin for MRSA bacteremia per chart review. The positive cultures are from an outside facility that were reported, so has not been treated. Repeat blood cultures have been collected here and show NGTD.   · CrCl ~40-50 mL/min  · To dose vancomycin, pharmacy will be utilizing [employer] calculation software for goal AUC/CLIFF 400-600 mg/L-hr   · 3/11: Level collected yesterday @ 8897 (instead of today @ 1430) = 13.2 mcg/mL, AUC/CLIFF 717    Plan:  · Continue Vancomycin 1,250 IV every 24 hours  · Repeat level on new regimen  · Will continue to monitor renal function   · Clinical pharmacy will continue to follow      Jono Jurado PharmD 3/15/2022 1:44 PM   [phone]

## 2022-03-16 LAB
ALBUMIN SERPL-MCNC: 3 G/DL (ref 3.5–5.2)
ALP BLD-CCNC: 74 U/L (ref 35–104)
ALT SERPL-CCNC: 25 U/L (ref 0–32)
ANION GAP SERPL CALCULATED.3IONS-SCNC: 13 MMOL/L (ref 7–16)
AST SERPL-CCNC: 35 U/L (ref 0–31)
BASOPHILS ABSOLUTE: 0.03 E9/L (ref 0–0.2)
BASOPHILS RELATIVE PERCENT: 0.2 % (ref 0–2)
BILIRUB SERPL-MCNC: 0.5 MG/DL (ref 0–1.2)
BUN BLDV-MCNC: 11 MG/DL (ref 6–23)
CALCIUM SERPL-MCNC: 9.5 MG/DL (ref 8.6–10.2)
CHLORIDE BLD-SCNC: 105 MMOL/L (ref 98–107)
CO2: 26 MMOL/L (ref 22–29)
CREAT SERPL-MCNC: 0.6 MG/DL (ref 0.5–1)
EOSINOPHILS ABSOLUTE: 0.55 E9/L (ref 0.05–0.5)
EOSINOPHILS RELATIVE PERCENT: 4.5 % (ref 0–6)
GFR AFRICAN AMERICAN: >60
GFR NON-AFRICAN AMERICAN: >60 ML/MIN/1.73
GLUCOSE BLD-MCNC: 165 MG/DL (ref 74–99)
HCT VFR BLD CALC: 27.6 % (ref 34–48)
HCT VFR BLD CALC: 28.1 % (ref 34–48)
HCT VFR BLD CALC: 28.5 % (ref 34–48)
HEMOGLOBIN: 9 G/DL (ref 11.5–15.5)
HEMOGLOBIN: 9.2 G/DL (ref 11.5–15.5)
HEMOGLOBIN: 9.2 G/DL (ref 11.5–15.5)
IMMATURE GRANULOCYTES #: 0.19 E9/L
IMMATURE GRANULOCYTES %: 1.5 % (ref 0–5)
LYMPHOCYTES ABSOLUTE: 1.32 E9/L (ref 1.5–4)
LYMPHOCYTES RELATIVE PERCENT: 10.7 % (ref 20–42)
MAGNESIUM: 2.2 MG/DL (ref 1.6–2.6)
MCH RBC QN AUTO: 30.5 PG (ref 26–35)
MCHC RBC AUTO-ENTMCNC: 32.6 % (ref 32–34.5)
MCV RBC AUTO: 93.6 FL (ref 80–99.9)
METER GLUCOSE: 131 MG/DL (ref 74–99)
METER GLUCOSE: 134 MG/DL (ref 74–99)
METER GLUCOSE: 166 MG/DL (ref 74–99)
METER GLUCOSE: 192 MG/DL (ref 74–99)
MONOCYTES ABSOLUTE: 0.72 E9/L (ref 0.1–0.95)
MONOCYTES RELATIVE PERCENT: 5.9 % (ref 2–12)
NEUTROPHILS ABSOLUTE: 9.48 E9/L (ref 1.8–7.3)
NEUTROPHILS RELATIVE PERCENT: 77.2 % (ref 43–80)
PDW BLD-RTO: 17.3 FL (ref 11.5–15)
PHOSPHORUS: 1.6 MG/DL (ref 2.5–4.5)
PHOSPHORUS: 2.8 MG/DL (ref 2.5–4.5)
PLATELET # BLD: 355 E9/L (ref 130–450)
PMV BLD AUTO: 10.3 FL (ref 7–12)
POTASSIUM SERPL-SCNC: 3 MMOL/L (ref 3.5–5)
POTASSIUM SERPL-SCNC: 4.3 MMOL/L (ref 3.5–5)
RBC # BLD: 2.95 E12/L (ref 3.5–5.5)
REASON FOR REJECTION: NORMAL
REJECTED TEST: NORMAL
SODIUM BLD-SCNC: 144 MMOL/L (ref 132–146)
TOTAL PROTEIN: 6 G/DL (ref 6.4–8.3)
TRIGL SERPL-MCNC: 121 MG/DL (ref 0–149)
WBC # BLD: 12.3 E9/L (ref 4.5–11.5)

## 2022-03-16 PROCEDURE — 84100 ASSAY OF PHOSPHORUS: CPT

## 2022-03-16 PROCEDURE — 97535 SELF CARE MNGMENT TRAINING: CPT | Performed by: OCCUPATIONAL THERAPIST

## 2022-03-16 PROCEDURE — 85025 COMPLETE CBC W/AUTO DIFF WBC: CPT

## 2022-03-16 PROCEDURE — 82962 GLUCOSE BLOOD TEST: CPT

## 2022-03-16 PROCEDURE — 85014 HEMATOCRIT: CPT

## 2022-03-16 PROCEDURE — 6370000000 HC RX 637 (ALT 250 FOR IP): Performed by: SURGERY

## 2022-03-16 PROCEDURE — 85018 HEMOGLOBIN: CPT

## 2022-03-16 PROCEDURE — 36592 COLLECT BLOOD FROM PICC: CPT

## 2022-03-16 PROCEDURE — 97530 THERAPEUTIC ACTIVITIES: CPT | Performed by: PHYSICAL THERAPIST

## 2022-03-16 PROCEDURE — 83735 ASSAY OF MAGNESIUM: CPT

## 2022-03-16 PROCEDURE — 2500000003 HC RX 250 WO HCPCS: Performed by: INTERNAL MEDICINE

## 2022-03-16 PROCEDURE — 97530 THERAPEUTIC ACTIVITIES: CPT | Performed by: OCCUPATIONAL THERAPIST

## 2022-03-16 PROCEDURE — 6360000002 HC RX W HCPCS: Performed by: INTERNAL MEDICINE

## 2022-03-16 PROCEDURE — 80053 COMPREHEN METABOLIC PANEL: CPT

## 2022-03-16 PROCEDURE — 2060000000 HC ICU INTERMEDIATE R&B

## 2022-03-16 PROCEDURE — 2580000003 HC RX 258: Performed by: INTERNAL MEDICINE

## 2022-03-16 PROCEDURE — 36415 COLL VENOUS BLD VENIPUNCTURE: CPT

## 2022-03-16 PROCEDURE — 2700000000 HC OXYGEN THERAPY PER DAY

## 2022-03-16 PROCEDURE — 84478 ASSAY OF TRIGLYCERIDES: CPT

## 2022-03-16 PROCEDURE — 97110 THERAPEUTIC EXERCISES: CPT | Performed by: PHYSICAL THERAPIST

## 2022-03-16 PROCEDURE — 2580000003 HC RX 258: Performed by: SURGERY

## 2022-03-16 PROCEDURE — 6360000002 HC RX W HCPCS: Performed by: SURGERY

## 2022-03-16 PROCEDURE — 84132 ASSAY OF SERUM POTASSIUM: CPT

## 2022-03-16 PROCEDURE — C9113 INJ PANTOPRAZOLE SODIUM, VIA: HCPCS | Performed by: SURGERY

## 2022-03-16 RX ORDER — ACETAMINOPHEN 650 MG/1
650 SUPPOSITORY RECTAL EVERY 4 HOURS PRN
Status: DISCONTINUED | OUTPATIENT
Start: 2022-03-16 | End: 2022-03-22 | Stop reason: HOSPADM

## 2022-03-16 RX ORDER — PALIPERIDONE 1.5 MG/1
1.5 TABLET, EXTENDED RELEASE ORAL EVERY MORNING
Status: DISCONTINUED | OUTPATIENT
Start: 2022-03-17 | End: 2022-03-16

## 2022-03-16 RX ADMIN — HEPARIN 300 UNITS: 100 SYRINGE at 10:04

## 2022-03-16 RX ADMIN — POTASSIUM CHLORIDE 20 MEQ: 29.8 INJECTION, SOLUTION INTRAVENOUS at 06:48

## 2022-03-16 RX ADMIN — ANTI-FUNGAL POWDER MICONAZOLE NITRATE TALC FREE: 1.42 POWDER TOPICAL at 21:02

## 2022-03-16 RX ADMIN — HEPARIN 300 UNITS: 100 SYRINGE at 21:00

## 2022-03-16 RX ADMIN — POTASSIUM CHLORIDE 20 MEQ: 29.8 INJECTION, SOLUTION INTRAVENOUS at 11:01

## 2022-03-16 RX ADMIN — POTASSIUM CHLORIDE 20 MEQ: 29.8 INJECTION, SOLUTION INTRAVENOUS at 10:13

## 2022-03-16 RX ADMIN — PETROLATUM: 420 OINTMENT TOPICAL at 10:05

## 2022-03-16 RX ADMIN — PIPERACILLIN SODIUM AND TAZOBACTAM SODIUM 3375 MG: 3; 375 INJECTION, POWDER, FOR SOLUTION INTRAVENOUS at 16:22

## 2022-03-16 RX ADMIN — Medication 10 ML: at 21:00

## 2022-03-16 RX ADMIN — FLUCONAZOLE IN SODIUM CHLORIDE 200 MG: 2 INJECTION, SOLUTION INTRAVENOUS at 10:25

## 2022-03-16 RX ADMIN — Medication 10 ML: at 10:06

## 2022-03-16 RX ADMIN — ANTI-FUNGAL POWDER MICONAZOLE NITRATE TALC FREE: 1.42 POWDER TOPICAL at 10:04

## 2022-03-16 RX ADMIN — Medication 10 ML: at 10:04

## 2022-03-16 RX ADMIN — SODIUM PHOSPHATE, MONOBASIC, MONOHYDRATE 8.43 MMOL: 276; 142 INJECTION, SOLUTION INTRAVENOUS at 16:18

## 2022-03-16 RX ADMIN — PIPERACILLIN SODIUM AND TAZOBACTAM SODIUM 3375 MG: 3; 375 INJECTION, POWDER, FOR SOLUTION INTRAVENOUS at 23:57

## 2022-03-16 RX ADMIN — PANTOPRAZOLE SODIUM 40 MG: 40 INJECTION, POWDER, FOR SOLUTION INTRAVENOUS at 10:03

## 2022-03-16 RX ADMIN — POTASSIUM CHLORIDE: 2 INJECTION, SOLUTION, CONCENTRATE INTRAVENOUS at 18:42

## 2022-03-16 RX ADMIN — PETROLATUM: 420 OINTMENT TOPICAL at 21:02

## 2022-03-16 RX ADMIN — VANCOMYCIN HYDROCHLORIDE 1250 MG: 10 INJECTION, POWDER, LYOPHILIZED, FOR SOLUTION INTRAVENOUS at 04:09

## 2022-03-16 RX ADMIN — PANTOPRAZOLE SODIUM 40 MG: 40 INJECTION, POWDER, FOR SOLUTION INTRAVENOUS at 21:00

## 2022-03-16 RX ADMIN — PIPERACILLIN SODIUM AND TAZOBACTAM SODIUM 3375 MG: 3; 375 INJECTION, POWDER, FOR SOLUTION INTRAVENOUS at 06:54

## 2022-03-16 ASSESSMENT — PAIN SCALES - GENERAL
PAINLEVEL_OUTOF10: 0
PAINLEVEL_OUTOF10: 3
PAINLEVEL_OUTOF10: 5
PAINLEVEL_OUTOF10: 0

## 2022-03-16 ASSESSMENT — PAIN SCALES - PAIN ASSESSMENT IN ADVANCED DEMENTIA (PAINAD)
TOTALSCORE: 5
FACIALEXPRESSION: 1
NEGVOCALIZATION: 1
FACIALEXPRESSION: 0
BODYLANGUAGE: 1
BREATHING: 1
NEGVOCALIZATION: 1
TOTALSCORE: 3
BODYLANGUAGE: 1
BREATHING: 0
CONSOLABILITY: 1
CONSOLABILITY: 1

## 2022-03-16 NOTE — PROGRESS NOTES
OT BEDSIDE TREATMENT NOTE   [facility]  [address], [address]    Date:3/16/2022                                                   Patient Name: Kitty Banks     MRN: [id number]     : 1945     Room: [address] [place] [address]     EVAL Evaluating OT: Lizzette Ball, OTR/L; [id number]        Referring Provider and Orders/Date:   OT eval and treat Start: 22, End: 22, ONE TIME, Standing Count: 1 Occurrences, R    [facility],      Diagnosis: failure to thrive     Surgery:3/16/22: ANAL PROCTO SIGMOIDOSCOPY FLEXIBLE; 3/3: EGD PEG TUBE PLACEMENT       Pertinent Medical History:        Past Medical History        Past Medical History:   Diagnosis Date    Allergic      Anxiety disorder      Arthritis      Difficulty walking      Dysphagia      Hyperlipidemia      Hypertension      Interstitial cystitis      Lack of coordination      Mitral valve prolapse       pt. has a mitral valve prolapse    Neuromuscular disorder ([address]      Other disorders of kidney and ureter in diseases classified elsewhere      Pelvis fracture ([address]       multiple fxs pelvis    Psychiatric problem      Thyroid disease      Trigeminal neuralgia      Unspecified dementia without behavioral disturbance (HCC)               Past Surgical History         Past Surgical History:   Procedure Laterality Date    FRACTURE SURGERY        GASTROSTOMY TUBE PLACEMENT N/A 3/3/2022     EGD PEG TUBE PLACEMENT performed by Dash Irving MD at [address] Left             Precautions:  Fall Risk, abdominal drain, armenta, 3L O2    Recommended placement: subacute    Assessment of current deficits     [x]? Functional mobility           [x]? ADLs           [x]? Strength                   [x]? Cognition     [x]? Functional transfers         [x]? IADLs         [x]? Safety Awareness   [x]? Endurance [x]? Fine Coordination                        [x]? Balance      []? Vision/perception    []? Sensation       [x]? Gross Motor Coordination            [x]? ROM           []? Delirium                   [x]?  Motor Control      OT PLAN OF CARE   OT POC based on physician orders, patient diagnosis and results of clinical assessment     Frequency/Duration 1-3 days/wk for 2 weeks PRN   Specific OT Treatment Interventions to include:   * Instruction/training on adapted ADL techniques and AE recommendations to increase functional independence within precautions       * Training on energy conservation strategies, correct breathing pattern and techniques to improve independence/tolerance for self-care routine  * Functional transfer/mobility training/DME recommendations for increased independence, safety, and fall prevention  * Patient/Family education to increase follow through with safety techniques and functional independence  * Recommendation of environmental modifications for increased safety with functional transfers/mobility and ADLs  * Cognitive retraining/development of therapeutic activities to improve problem solving, judgement, memory, and attention for increased safety/participation in ADL/IADL tasks  * Therapeutic exercise to improve motor endurance, ROM, and functional strength for ADLs/functional transfers  * Therapeutic activities to facilitate/challenge dynamic balance, stand tolerance for increased safety and independence with ADLs  * Therapeutic activities to facilitate gross/fine motor skills for increased independence with ADLs  * Neuro-muscular re-education: facilitation of righting/equilibrium reactions, midline orientation, scapular stability/mobility, normalization of muscle tone, and facilitation of volitional active controled movement  * Positioning to improve skin integrity, interaction with environment and functional independence      Recommended Adaptive Equipment/DME:  TBD       Home Living: Pt admitted from SNF with plans to return. Pt was a poor historian throughout the session. Bathroom setup: sponge bathing    DME owned: w/c      Prior Level of Function: max A with ADLs , dep with IADLs; out of bed to w/c-no report on type of transfer   Driving: no   Occupation: retired   Enjoys: watching sitcoms      Pain Level: no  Cognition: A&O: 1/4 to self only with moderate encouragement. When provided with options, she reported [place] the [place]; Follows 1step directions              Memory:  Poor+              Sequencing:  poor              Problem solving:  Poor              Judgement/safety:  Poor     AM-PAC Daily Activity Inpatient   AM-PAC Daily Activity Inpatient   How much help for putting on and taking off regular lower body clothing?: Total  How much help for Bathing?: Total  How much help for Toileting?: Total  How much help for putting on and taking off regular upper body clothing?: Total  How much help for taking care of personal grooming?: Total  How much help for eating meals?: Total  AM-PAC Inpatient Daily Activity Raw Score: 6  AM-PAC Inpatient ADL T-Scale Score : 17.07  ADL Inpatient CMS 0-100% Score: 100  ADL Inpatient CMS G-Code Modifier : CN    Functional Assessment:      Initial Eval Status  Date: 3/10/2022   Treatment Status  Date: 3/16/22 STGs = LTGs  Time frame: 10-14 days   Feeding Dependent per physical ability and due to NPO status  dep with NPO Max A if appropriate   Grooming Dependent for face/hand wash from supine and hair comb from sitting EOB with A x 2  Dep for hair wash with shampoo cap and hair comb from supine. When provided with a tissue, she knew to wash her nose, but required max A and confused with nasal cannula. Maximal Assist    UB Dressing Dependent for gown management  Dep for gown management in supine.   Maximal Assist    LB Dressing Dependent for donning tabitha socks from supine  Dep to don socks from supine level due to pt being \"cold\" Not Appropriate-PLOF      Bathing Dependent from supine level for sponge bathing  NT Maximal Assist    Toileting Dependent with incontinence with heavy bleeding. A x 2 required for rolling to maintain side lying to the right; dep with armenta management  Dep with armenta in place. Dep Assist x 1    Bed Mobility  Supine to sit: Maximal Assist x 2  Sit to supine: Maximal Assist  X 2  Rolling to the left with max A and to the right with max A x 2.    Dep for rolling and positioning in bed with overall confusion and decreased understanding compared to eval. Dep for midline posture with pillows to adjust extreme left lateral lean and cervical lateral flex. Supine to sit: Maximal Assist   Sit to supine: Maximal Assist    Functional Transfers  NT due to pt overall debility, decreased activity tolerance, balance deficits, safety and fall risk.      NT Maximal Assist    Functional Mobility  Not appropriate at time of eval. To re-assess at at later time if appropriate.      Not Appropriate-PLOF   Not appropriate at time of eval. To re-assess at at later time if appropriate. Balance Sitting:     Static:  poor    Dynamic:poor  Standing: NT NT  Sitting:     Static:  Poor+   Activity Tolerance Vitals with activity:2L 82-91%  With ADLs and sitting EOB. Sitting EOB for 5min period overall Vitals with activity:3L -96%  With ADLs and positioning in bed. Not safe to attempt EOB this session with confusion and debility.  Increase sitting tolerance for >10min with stable vital signs for carry over into functional tranfers and indep in ADLs   Visual/  Perceptual Glasses: not Present; wears glasses all the time     Reports change in vision since admission: No      NA   Nate UE Strengthening  3+/5 generally  3/5MMT generallly 4/5MMT generally for carry over into self care, functional transfers and functional mobility with AD.       Hand Dominance  [x]? Right   []?  Left     AROM (PROM) Strength Additional Info:    RUE  WFL 3+/5 good  and poor+ FMC/dexterity noted during ADL tasks  Opposition [x]? Intact []? Impaired  Finger to nose [x]? Intact []? Impaired      LUE WFL 3+/5 good  and poor+ FMC/dexterity noted during ADL tasks  Opposition [x]? Intact []? Impaired  Finger to nose [x]? Intact []? Impaired      Hearing: WFL   Sensation:  No c/o numbness or tingling   Tone: WFL   Edema: none                Comments: Upon arrival patient in bed with left lateral lean and confusion. Pt required dep A for most UB ADLs and dep A LB ADLs tasks. Limited with dep A for positioning. The biggest barriers reflect that of functional transfers, functional mobility, UB/LB ADLs, cognition, activity tolerance, balance, safety and strengthening. At end of session, patient supine in midline position with call light and phone within reach, all lines and tubes intact. Overall patient demonstrated decreased independence and safety during completion of ADL/functional transfer/mobility tasks. Nursing updated on pt position and status following OT treatment. Pt would benefit from continued skilled OT to increase safety and independence with completion of ADL/IADL tasks for functional independence and quality of life. Treatment: OT treatment provided this date includes:   Instruction, education and training on safe facilitation and adapted techniques for completion of ADLs. These include proper positioning/alignment to improve interaction with environment and overall function and on adapted techniques/work simplification for completion of ADLs. Education provided on hand/feet placement with bed rails for pressure relief and positioning. Extended time to complete all tasks, including skilled monitoring of patient's response during treatment session and vital signs. Prior to and at the end of session, environmental modifications / line management completed for patients safety and efficiency of treatment session. See above for further details.     · Pt has made poor progress towards set goals    · OT 1-3x/week for 5-7 days during hospitalization      Treatment Time also includes thorough review of current medical information, gathering information on past medical history/social history and prior level of function, informal observation of tasks, assessment of data and education on plan of care and goals.      Treatment Time In: 1008    Treatment Time Out: 4920     Treatment Charges: Mins Units   ADL/Home Mgt     95932 14 1   Thera Activities     37332 13 1   Ther Ex                 45000       Manual Therapy    50463       Neuro Re-ed         76364       Orthotic manage/training                               98090       Non Billable Time       Total Timed Treatment 27 2     Jillian Casarez OTR/L; [id number]

## 2022-03-16 NOTE — PROGRESS NOTES
therapy plan of care is established based on physician order,  patient diagnosis and clinical assessment    Current Treatment Recommendations:    -Bed Mobility: Lower extremity exercises   -Sitting Balance: Incorporate reaching activities to activate trunk muscles , Hands on support to maintain midline , Facilitate active trunk muscle engagement  and Facilitate postural control in all planes   -Standing Balance: Perform strengthening exercises in standing to promote motor control with or without upper extremity support  and Instruct patient on adequate base of support to maintain balance  -Transfers: Provide instruction on proper hand and foot position for adequate transfer of weight onto lower extremities and use of gait device if needed and Cues for hand placement, technique and safety. Provide stabilization to prevent fall     PT long term treatment goals are located in below grid    Patient and or family understand(s) diagnosis, prognosis, and plan of care. Frequency of treatments: Patient will be seen  3-5 times/week.          Prior Level of Function: Patient out of bed to w/c- no history of type of transfer  Rehab Potential: good  for baseline    Past medical history:   Past Medical History:   Diagnosis Date    Allergic     Anxiety disorder     Arthritis     Difficulty walking     Dysphagia     Hyperlipidemia     Hypertension     Interstitial cystitis     Lack of coordination     Mitral valve prolapse     pt. has a mitral valve prolapse    Neuromuscular disorder (HCC)     Other disorders of kidney and ureter in diseases classified elsewhere     Pelvis fracture (HCC)     multiple fxs pelvis    Psychiatric problem     Thyroid disease     Trigeminal neuralgia     Unspecified dementia without behavioral disturbance ([facility] [address] [facility]      Past Surgical History:   Procedure Laterality Date    FRACTURE SURGERY      GASTROSTOMY TUBE PLACEMENT N/A 3/3/2022    EGD PEG TUBE PLACEMENT performed by HAYLIE Annamarie Gatica MD at [facility] 3/15/2022    SIGMOIDOSCOPY DIAGNOSTIC FLEXIBLE performed by Lucinda Johnson MD at [address]     TRANSESOPHAGEAL ECHOCARDIOGRAM N/A 3/11/2022    TRANSESOPHAGEAL ECHOCARDIOGRAM performed by Yolande Ellington MD at [facility] N/A 3/10/2022    EGD ESOPHAGOGASTRODUODENOSCOPY PEG TUBE INSERTION; EVACUATION OF STOOL IMPACTON performed by Laurel Reeves MD at [address]:    Precautions:  falls , abdominal drain, armenta, O2, Incontinent of bowel  Social history: Came to hospital from [facility] ED and reported there from nursing home. Patient lis a poor historian. Equipment owned: Wheelchair,      [address]   How much difficulty turning over in bed?: A Lot  How much difficulty sitting down on / standing up from a chair with arms?: Unable  How much difficulty moving from lying on back to sitting on side of bed?: A Lot  How much help from another person moving to and from a bed to a chair?: Total  How much help from another person needed to walk in hospital room?: Total  How much help from another person for climbing 3-5 steps with a railing?: Total  AM-PAC Inpatient Mobility Raw Score : 8  AM-PAC Inpatient T-Scale Score : 28.52  Mobility Inpatient CMS 0-100% Score: 86.62  Mobility Inpatient CMS G-Code Modifier : CM    Nursing cleared patient for PT treatment. OBJECTIVE;   Initial Evaluation  Date: 3/10/2022 Treatment Date:  3/16/2022     Short Term/ Long Term   Goals   Was pt agreeable to Eval/treatment?  Yes yes To be met in 5 days   Pain level   0/10   Neck pain, no number assigned    Bed Mobility    Rolling: Maximal assist of 1    Supine to sit: Maximal assist of  2    Sit to supine: Maximal assist of  2    Scooting: Maximal assist of 1   Rolling: Maximal assist of 1   Supine to sit: Maximal assist of 1   Sit to supine: Maximal assist of  2   Scooting: Maximal assist of 1    Rolling: Minimal assist of 1    Supine to sit: Maximal assist of 1    Sit to supine: Maximal assist of 1    Scooting: Moderate assist of 1     Transfers Sit to stand: Not assessed  d/t to pt overall debility, decreased activity tolerance, balance deficits, safety and fall risk. Sit to stand: Not assessed  due to weakness and debility     Sit to stand: Maximal assist of 1    Ambulation    not assessed at this time due to not appropriate. Not assessed  not appropriate at this time. To re-assess at a later time if appropriate     ROM Within functional limits    Increase range of motion 10% of affected joints    Strength BUE:  refer to OT eval  RLE:  3/5  LLE:  3/5  Increase strength in affected mm groups by 1/3 grade   Balance Sitting EOB:  poor, posterior lean, max assist x2 to remain upright   Dynamic Standing:  not assessed  Sitting EOB: poor   Dynamic Standing: not assessed    Sitting EOB:  fair   Dynamic Standing: fair      Patient is Alert & Oriented x person and time and follows one step directions    Sensation:  Patient  denies numbness/tingling   Edema:  no   Endurance: poor    Vitals: 3 liters nasal cannula   Blood Pressure at rest  Blood Pressure during session    Heart Rate at rest 90 Heart Rate during session    SPO2 at rest 97%  SPO2 during session 90-96%     Patient education  Patient educated on role of Physical Therapy, risks of immobility, safety and plan of care,  importance of mobility while in hospital , ankle pumps, quad set and glut set for edema control, blood clot prevention, safety  and positioning for skin integrity and comfort     Patient response to education:   Pt verbalized understanding Pt demonstrated skill Pt requires further education in this area   Yes Partial Yes      Treatment:  Patient practiced and was instructed/facilitated in the following treatment: Patient assisted with supine exercises, UE/LE.  Patient sat edge of bed for 3-4 minutes with maximal assistance to increase overall sitting balance and tolerance to activity. Therapeutic Exercises: AAROM ankle pumps, heel slide, straight leg raise, shoulder elevation and elbow flexion/extension  x 10-15 reps. At end of session, patient in bed with nursing present call light and phone within reach,  all lines and tubes intact, nursing notified. Patient would benefit from continued skilled Physical Therapy to improve functional independence and quality of life.          Patient's/ family goals   none stated    Time in  1023  Time out  1047    Total Treatment Time  24  minutes    CPT codes:  Therapeutic activities (03221)   14 minutes  1 unit(s)  Therapeutic exercises (31134)   10 minutes  1 unit(s)     June Guerrero, Student Physical Therapist

## 2022-03-16 NOTE — PROGRESS NOTES
[facility]  [facility]  Progress Note    NAME: Arvin Bishop  MR:  [id number]  :   1945  DATE OF SERVICE:22    This is a face to face encounter with Arvin Bishop 67 y.o. female on 22    CHIEF COMPLAINT     ID following   HISTORY OF PRESENT ILLNESS   Pt seen and examined  22   In bed on o2 on TPN  picc peg no d/c    3/15/2022  In bed pale falls asleep easily    3/14/2022  Awake- in bed  PEG tube is not working  Patient had bloody stools this am.   She has been afebrile. Tachy     Son at bedside and updated. 3/13/2022  In bed working with pt  Son present and updated  Tmax 99.7 2L weakness    3/12/2022  PER STAFF PT HAD A FEVER [id number] 4L  HER TF IS GOING THROUGH HER STOMA BAG   SHE HAS NO C/O F/C/N/V/D    3/11/2022  More awake and alert has no c/o   Afebrile 3L    3/10/2022  Seen postop EGD ESOPHAGOGASTRODUODENOSCOPY PEG TUBE INSERTION, exam under anesthesia, manual fecal disimpaction, control perianal hemorrhage, rectal packing     nad  Has pain     Patient is tolerating medications. No reported adverse drug reactions. REVIEW OF SYSTEMS     As stated above in the chief complaint, otherwise negative.   CURRENT MEDICATIONS      lidocaine PF  5 mL IntraDERmal Once    sodium chloride flush  5-40 mL IntraVENous 2 times per day    heparin flush  3 mL IntraVENous 2 times per day    levothyroxine  50 mcg Oral Daily    sertraline  100 mg Oral Daily    buPROPion  50 mg Oral Q6H    vancomycin  1,250 mg IntraVENous Q24H    pantoprazole  40 mg IntraVENous BID    miconazole   Topical BID    Petrolatum   Topical BID    piperacillin-tazobactam  3,375 mg IntraVENous Q8H    fluconazole  200 mg IntraVENous Q24H    [Held by provider] heparin (porcine)  5,000 Units SubCUTAneous 3 times per day     Continuous Infusions:   PN-Adult  3 IN 1 Central Line (Custom)      sodium chloride      PN-Adult  3 IN 1 Central Line (Custom) Stopped (22 1321)    sodium chloride      sodium chloride      sodium chloride       PRN Meds:sodium phosphate IVPB **OR** sodium phosphate IVPB, acetaminophen, sodium chloride, potassium chloride, sodium chloride flush, sodium chloride, heparin flush, sodium chloride, acetaminophen, Petrolatum **AND** Petrolatum, sodium chloride, morphine, ondansetron, albuterol    PHYSICAL EXAM     /62   Pulse 108   Temp 98.4 °F (36.9 °C) (Infrared)   Resp 22   Ht 4' 11\" (1.499 m)   Wt 116 lb 3.2 oz (52.7 kg)   SpO2 95%   BMI 23.47 kg/m²   Temp  Av.8 °F (37.1 °C)  Min: 98.1 °F (36.7 °C)  Max: 99.5 °F (37.5 °C)  Constitutional:  The patient is arousable Pale-   Skin:    Warm and dry. HEENT:     AT/NC  Chest:   No use of accessory muscles to breathe. Symmetrical expansion. Dec bs 2l  Cardiovascular:  S1 and S2 are rhythmic and regular. Abdomen:   Positive bowel sounds to auscultation. tender peg binder stoma   Extremities:      Edema. Dec rom   CNS     no acute distress   Lines: piv  Carrera yellow CLEAR    DIAGNOSTIC RESULTS   Radiology:  3/14 ct a/p   1.  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 2.  No intra-abdominal or abdominal wall abscess. 3.  Stable pelvic fractures on the left. 4.  No evidence of intestinal obstruction or fecal impaction. 5.  Slight increase in size of small bilateral pleural effusions. 6.  Gastrostomy tube and urinary Carrera catheter appear to be in appropriate position  .      Recent Labs     22  0818 22  1151 03/15/22  1025 03/15/22  1630 03/15/22  2350 22  0550 22  1400   WBC 16.2*  --  12.0*  --   --  12.3*  --    RBC 3.05*  --  2.20*  --   --  2.95*  --    HGB 9.7*   < > 7.0*   < > 9.2* 9.0* 9.2*   HCT 29.5*   < > 21.8*   < > 28.1* 27.6* 28.5*   MCV 96.7  --  99.1  --   --  93.6  --    MCH 31.8  --  31.8  --   --  30.5  --    MCHC 32.9  --  32.1  --   --  32.6  --    RDW 14.5  --  14.6  --   --  17.3*  --      --  329  --   --  355  -- MPV 10.5  --  10.6  --   --  10.3  --     < > = values in this interval not displayed. Recent Labs     03/14/22  0818 03/14/22  0818 03/15/22  1025 03/16/22  0550 03/16/22  1600     --  143 144  --    K 3.5   < > 3.3* 3.0* 4.3     --  105 105  --    CO2 25  --  27 26  --    BUN 10  --  9 11  --    CREATININE 0.5  --  0.6 0.6  --    GLUCOSE 105*  --  94 165*  --    PROT 6.6  --  6.0* 6.0*  --    LABALBU 3.3*  --  3.0* 3.0*  --    CALCIUM 10.3*  --  9.8 9.5  --    BILITOT 0.6  --  0.7 0.5  --    ALKPHOS 110*  --  118* 74  --    AST 27  --  40* 35*  --    ALT 26  --  23 25  --     < > = values in this interval not displayed. Lab Results   Component Value Date    CRP 31.9 (H) 03/08/2022     Lab Results   Component Value Date    SEDRATE 109 (H) 03/08/2022     Recent Labs     03/14/22  0818 03/15/22  1025 03/16/22  0550   AST 27 40* 35*   ALT 26 23 25   TRIG  --   --  121     Microbiology:   No results for input(s): COVID19 in the last 72 hours.   Lab Results   Component Value Date    BC 24 Hours no growth 03/12/2022    BC 5 Days no growth 03/08/2022    BLOODCULT2 24 Hours no growth 03/12/2022    BLOODCULT2 5 Days no growth 03/08/2022    ORG Staphylococcus aureus 03/08/2022    ORG Klebsiella oxytoca 07/01/2014     Travessa Circe 852        urine 2/16/2022       FINAL IMPRESSION     Admitted for Sepsis   On treatment for MRSA sepsis/uti KADEN neg   Peg infection s/p reinsertion new site NPO on TPN  Gib/fecal impaction   leukocytosis reactive better  FEVERS     Plan:   vancomycin (VANCOCIN) 1,250 mg in dextrose 5 % 250 mL IVPB, Q24H  miconazole (MICOTIN) 2 % powder, BID  piperacillin-tazobactam (ZOSYN) 3,375 mg in dextrose 5 % 50 mL IVPB extended infusion (mini-bag), Q8H  fluconazole (DIFLUCAN) in 0.9 % sodium chloride IVPB 200 mg, Q24H      · Continue vancomycin for 4 weeks from 3/8/2022  · Pharmacy dosing   · Continue zosyn day #7/10  · Continue diflucan for now   · For LTAC-   · GI/surgery following    · Continue wound care   · Monitor labs-    Imaging and labs were reviewed per medical records. Thank you for involving me in the care of Maddy Bishop will continue to follow. Please do not hesitate to call for any questions or concerns.     Electronically signed by Facundo Barnett MD on 3/16/2022 at 4:40 PM

## 2022-03-16 NOTE — PLAN OF CARE
Problem: Falls - Risk of:  Goal: Will remain free from falls  Description: Will remain free from falls  Outcome: Ongoing  Goal: Absence of physical injury  Description: Absence of physical injury  Outcome: Ongoing     Problem: Skin Integrity:  Goal: Will show no infection signs and symptoms  Description: Will show no infection signs and symptoms  Outcome: Ongoing  Goal: Absence of new skin breakdown  Description: Absence of new skin breakdown  Outcome: Ongoing     Problem: Malnutrition  (NI-5.2)  Goal: Food and/or Nutrient Delivery  Description: Individualized approach for food/nutrient provision.   3/15/2022 1322 by Ulysses Dirk, RD, DORIAN  Outcome: Met This Shift     Problem: Pain:  Goal: Pain level will decrease  Description: Pain level will decrease  Outcome: Ongoing  Goal: Control of acute pain  Description: Control of acute pain  Outcome: Ongoing  Goal: Control of chronic pain  Description: Control of chronic pain  Outcome: Ongoing

## 2022-03-16 NOTE — PROGRESS NOTES
Notified Dr. Jose Cash that patient is NPO so all the antipsychotic medication can't be given. I ask for something via IV. He stated he would take a look.

## 2022-03-16 NOTE — FLOWSHEET NOTE
Inpatient Wound Care    Admit Date: 3/8/2022  6:25 PM    Reason for consult:  abd wound old Peg tube site    Significant history:    Past Medical History:   Diagnosis Date    Allergic     Anxiety disorder     Arthritis     Difficulty walking     Dysphagia     Hyperlipidemia     Hypertension     Interstitial cystitis     Lack of coordination     Mitral valve prolapse     pt. has a mitral valve prolapse    Neuromuscular disorder (HCC)     Other disorders of kidney and ureter in diseases classified elsewhere     Pelvis fracture (HCC)     multiple fxs pelvis    Psychiatric problem     Thyroid disease     Trigeminal neuralgia     Unspecified dementia without behavioral disturbance ([facility] [address]        Wound history:      Findings:       03/16/22 1205   Wound 03/10/22 Abdomen Medial;Upper   Date First Assessed/Time First Assessed: 03/10/22 (c) 2210   Location: Abdomen  Wound Location Orientation: Medial;Upper   Wound Etiology Surgical   Wound Cleansed Cleansed with saline   Dressing/Treatment Packing  (1/4 inch nugauze)   Wound Length (cm) 0.8 cm   Wound Width (cm) 0.8 cm   Wound Depth (cm) 3 cm   Wound Surface Area (cm^2) 0.64 cm^2   Wound Volume (cm^3) 1.92 cm^3   Wound Assessment Pink/red   Drainage Amount None   Odor None   Shanda-wound Assessment Intact       Impression:   Old peg tube site    Interventions in place:  Removed ostomy pouch    Plan:  Packed wound gently with 1/4 inch nugauze      Po Lancaster RN 3/16/2022 12:07 PM

## 2022-03-16 NOTE — CARE COORDINATION
SOCIAL WORK / DISCHARGE PLANNING:  COVID neg 3/12. PRECERT remains pending for [facility]. Pt has now started on TPN, IV atb. Wound care for old PEG site. Son/POA returned to [facility], will be unavailable Thurs due to his own medical procedure. [employer] rep aware. Son concerned about pt not receiving psych meds. Pt long term pt at [place], bed being held but could not do TPN.                Electronically signed by NIRANJAN Chapman on 3/16/2022 at 11:23 AM

## 2022-03-16 NOTE — PROGRESS NOTES
PROGRESS NOTE  By Suresh Hannah M.D. [facility]  Dr. Lexi Shine M.D.,  Dr. Leroy Ramsey M.D.,   Dr. Jamee Felder D.O.,  Dr. Danielle Burns M.D.,  Dr. Bindu Llanos D.O.,  Faraz Red D.O. Ty Kingston  67 y.o.  female    SUBJECTIVE:  No new complaints. No family at bedside    OBJECTIVE:    BP (!) 127/59   Pulse 102   Temp 98.7 °F (37.1 °C) (Axillary)   Resp 24   Ht 4' 11\" (1.499 m)   Wt 116 lb 3.2 oz (52.7 kg)   SpO2 97%   BMI 23.47 kg/m²     General: NAD/elderly  female  HEENT: Anicteric sclerae/moist oral mucosa  Neck: Supple with trachea midline  Chest: CTA B  Cor: Appears regular  Abd.: Soft/nontender. PEG tube noted in place.   Previous PEG tube site with packing  Extr.:  Decreased muscle tone and bulk throughout  Skin: Warm and dry        DATA:    Monitor data reviewed     Stool (measured) : 0 mL  Lab Results   Component Value Date    WBC 12.3 03/16/2022    RBC 2.95 03/16/2022    HGB 9.0 03/16/2022    HCT 27.6 03/16/2022    MCV 93.6 03/16/2022    MCH 30.5 03/16/2022    MCHC 32.6 03/16/2022    RDW 17.3 03/16/2022     03/16/2022    MPV 10.3 03/16/2022     Lab Results   Component Value Date     03/16/2022    K 3.0 03/16/2022     03/16/2022    CO2 26 03/16/2022    BUN 11 03/16/2022    CREATININE 0.6 03/16/2022    CALCIUM 9.5 03/16/2022    PROT 6.0 03/16/2022    LABALBU 3.0 03/16/2022    BILITOT 0.5 03/16/2022    ALKPHOS 74 03/16/2022    AST 35 03/16/2022    ALT 25 03/16/2022     Lab Results   Component Value Date    LIPASE 29 06/23/2014     Lab Results   Component Value Date    AMYLASE 202 06/24/2014         ASSESSMENT/PLAN:  Patient Active Problem List   Diagnosis    Odontoid fracture ([address]    Hypothyroid    Multiple rib fractures    TMJ (temporomandibular joint syndrome)    Bipolar disorder (HCC)    Sciatica    Hiatal hernia    Abnormality of gait and mobility    Trigeminal neuralgia    Headache    Cervical neck pain with evidence of disc disease    Vomiting    Hypokalemia    Intra-abdominal infection    MRSA bacteremia    Paroxysmal atrial fibrillation (HCC)    Hypotension    Hypernatremia    Moderate protein-calorie malnutrition (HCC)    Sepsis (HCC)     1.  Anemia/rectal bleed  -Recurrent lower gastrointestinal bleeding with decreasing H&H  -Likely related to ulceration from fecal impaction  -Disimpaction/treatment per general surgery 3/10  -Repeat endoscopy revealing stercoral ulcer with pigmented spots which were treated  -please refer to pertinent procedure note   -Monitor H&H; defer transfusion to admitting     2.  Elevated transaminase  -Nonobstructive liver profile  -Likely related to general medical condition/medication  -Monitor labs     3.  PEG tube   -Infected PEG tube site/insertion of new PEG  -Per general surgery  \    Kia Conley MD  3/16/2022  1:51 PM    NOTE:  This report was transcribed using voice recognition software. Every effort was made to ensure accuracy; however, inadvertent computerized transcription errors may be present.

## 2022-03-16 NOTE — PROGRESS NOTES
GENERAL SURGERY  DAILY PROGRESS NOTE  3/16/2022      Subjective:  Patient seen and examined. Currently n.p.o., no bloody bowel movements overnight. Objective:  /71   Pulse 94   Temp 99.2 °F (37.3 °C) (Axillary)   Resp 25   Ht 4' 11\" (1.499 m)   Wt 116 lb 3.2 oz (52.7 kg)   SpO2 98%   BMI 23.47 kg/m²     GENERAL:  Laying in bed, awake,  LUNGS:  No increased work of breathing  CARDIOVASCULAR:  RR  ABDOMEN:  Soft, non-tender, non-distended. G-tube in place.   Ostomy over previous PEG tube site with less drainage this morning  EXTREMITIES: No edema or swelling  SKIN: Warm and dry    Assessment/Plan:  67 y.o. female with PEG tube complication    Patient seen and examined  Lower GI bleed per GI team  Pain control per primary  We will have wound care to evaluate previous PEG tube site for possible packing  As long as there is significant draining from previous PEG tube site patient should remain n.p.o., time wall closure of the previous PEG tube site  Continue TPN  We will continue to monitor if it does not close we will have to address surgical closure in the future    Electronically signed by Myrtle Huggins MD on 3/16/2022 at 7:25 AM       As above

## 2022-03-16 NOTE — PROGRESS NOTES
Pharmacy Consultation Note  (Antibiotic Dosing and Monitoring)    Initial consult date: 3/9/22  Consulting physician/provider: Dr. Jesus Smith  Drug: Vancomycin  Indication: MRSA bacteremia    Age/  Gender Height Weight IBW  Allergy Information   76 y.o./female 4' 11\" (149.9 cm) 115 lb (52.2 kg)     Patient must be at least 60 in tall to calculate ideal body weight   Abilify [aripiprazole], Depakote [divalproex sodium], Dye [iodides], Geodon [ziprasidone hcl], Levsin [hyoscyamine], Lithium, Neurontin [gabapentin], Risperidone and related, Tegretol [carbamazepine], Tramadol, Trileptal [oxcarbazepine], and Zyprexa [olanzapine]      Renal Function:  Recent Labs     03/14/22  0818 03/15/22  1025 03/16/22  0550   BUN 10 9 11   CREATININE 0.5 0.6 0.6       Intake/Output Summary (Last 24 hours) at 3/16/2022 1152  Last data filed at 3/16/2022 1006  Gross per 24 hour   Intake 1890.86 ml   Output 2500 ml   Net -609.14 ml       Vancomycin Monitoring:  Trough:    No results for input(s): VANCOTROUGH in the last 72 hours. Random:  No results for input(s): VANCORANDOM in the last 72 hours. Vancomycin Administration Times:  Recent vancomycin administrations                   vancomycin (VANCOCIN) 1,250 mg in dextrose 5 % 250 mL IVPB (mg) 1,250 mg New Bag 03/12/22 0528    vancomycin (VANCOCIN) 1,000 mg in dextrose 5 % 250 mL IVPB (mg) 1,000 mg New Bag 03/11/22 0331     1,000 mg New Bag 03/10/22 1840     1,000 mg New Bag  0224     1,000 mg New Bag 03/09/22 1636                  Assessment:  · Patient is a 67 y.o. female who has been initiated on vancomycin for MRSA bacteremia per chart review. The positive cultures are from an outside facility that were reported, so has not been treated. Repeat blood cultures have been collected here and show NGTD.   · CrCl ~40-50 mL/min  · To dose vancomycin, pharmacy will be utilizing [employer] calculation software for goal AUC/CLIFF 400-600 mg/L-hr   · 3/11: Level collected yesterday @ 0307 (instead of today @ 1430) = 13.2 mcg/mL, AUC/CLIFF 717    Plan:  · Continue Vancomycin 1,250 IV every 24 hours  · Repeat trough tomorrow @ 0330;  Hold dose if level > 20 mcg/mL  · Will continue to monitor renal function   · Clinical pharmacy will continue to follow      Tomasz Etienne, PharmD 3/16/2022 11:52 AM   [phone]

## 2022-03-16 NOTE — PROGRESS NOTES
Internal Medicine Progress Note    [facility]    Shayy Leone. Evie Navarro., F.A.C.O.I. Antoine Castillo D.O., STEVOOLUCHO Martinez, MSN, APRN, NP-C  Alonso Bellamy. Virginie Phillips, MSN, APRN-CNP     Primary Care Physician: Reginald Doshi MD   Admitting Physician:  Og Shearer DO  Admission date and time: 3/8/2022  6:25 PM    Room:  [address] [place] [address]  Admitting diagnosis: Sepsis, unspecified organism [A41.9]  Sepsis [facility]) [A41.9]    Patient Name: Arvin Bishop  MRN: [id number]    Date of Service: 3/16/2022     Subjective:  Marcela Chappell is a 67 y.o. female who was seen and examined today,3/16/2022, at the bedside. Marcela Chappell is working with the therapy teams during my examination today. She is awake and alert but remains frail and deconditioned. She has less output from the previous PEG tube site but is no longer receiving tube feeds. She has tolerated the administration of TPN. We continue to await precertification for transfer to the [facility] facility. Her son was updated via the telephone today. Review of System:   Constitutional:   Persistent malaise and fatigue. HEENT:   Denies ear pain, sore throat, sinus or eye problems. Nasal cannula oxygen is in place. Cardiovascular:   Denies any chest pain, irregular heartbeats, or palpitations. Respiratory:   Denies shortness of breath, coughing, sputum production, hemoptysis, or wheezing. Gastrointestinal:   No substantial abdominal pain. PEG tube is in place. Ostomy collection bag is covering previous PEG tube site with less drainage today  Genitourinary:    Denies any urgency, frequency, hematuria. Voiding with use of a Carrera catheter. Extremities:   Denies lower extremity swelling, edema or cyanosis. Neurology:    Chronic debility with overall weakness. Psch:   Denies being anxious or depressed. Musculoskeletal:    Denies  myalgias, joint complaints or back pain.    Integumentary:   Denies any rashes, ulcers, or excoriations. Denies bruising. Hematologic/Lymphatic:  Denies bruising or bleeding. Physical Exam:  I/O this shift:  In: 278.3 [IV Piggyback:35.2]  Out: -     Intake/Output Summary (Last 24 hours) at 3/16/2022 1229  Last data filed at 3/16/2022 1006  Gross per 24 hour   Intake 1890.86 ml   Output 2500 ml   Net -609.14 ml   I/O last 3 completed shifts: In: 4955.8 [I.V.:1920.8; Blood:347.5; IV Piggyback:2007.6]  Out: 3000 [Urine:3000]  Patient Vitals for the past 96 hrs (Last 3 readings):   Weight   03/16/22 0006 116 lb 3.2 oz (52.7 kg)   03/15/22 0645 118 lb 12.8 oz (53.9 kg)   03/14/22 0000 110 lb 7 oz (50.1 kg)     Vital Signs:   Blood pressure (!) 127/59, pulse 102, temperature 98.7 °F (37.1 °C), temperature source Axillary, resp. rate 24, height 4' 11\" (1.499 m), weight 116 lb 3.2 oz (52.7 kg), SpO2 97 %. General appearance:  Awake and alert. Working with the therapy team.  Head:  Normocephalic. No masses, lesions or tenderness. Eyes:  PERRLA. EOMI. Sclera clear. ENT:  Ears normal. Mucosa normal.  Neck:    Supple. Trachea midline. No thyromegaly. No JVD. No bruits. Heart:    Rhythm regular. Rate controlled. No murmurs. Lungs:    Diminished with bibasilar rales. Abdomen:   PEG tube is in place and functioning appropriately. Ostomy is in place covering previous PEG tube site with rather significant output. Extremities:    Peripheral pulses present. No peripheral edema. No ulcers. No cyanosis. No clubbing. Neurologic:    Awake and alert. She answers questions. She is somewhat slow to respond. She is very weak and deconditioned. Psych:   Behavior is normal. Mood appears normal. Speech is not rapid and/or pressured. Musculoskeletal:   Spine ROM normal. Muscular strength intact. Gait not assessed. Integumentary:  Excoriation under the breasts- yeast.   Genitalia/Breast:  Voiding with use of a Carrera catheter.     Medication:  Scheduled Meds:   [START ON 3/17/2022] paliperidone  1.5 mg Oral QAM    lidocaine PF  5 mL IntraDERmal Once    sodium chloride flush  5-40 mL IntraVENous 2 times per day    heparin flush  3 mL IntraVENous 2 times per day    levothyroxine  50 mcg Oral Daily    sertraline  100 mg Oral Daily    buPROPion  50 mg Oral Q6H    vancomycin  1,250 mg IntraVENous Q24H    pantoprazole  40 mg IntraVENous BID    miconazole   Topical BID    Petrolatum   Topical BID    piperacillin-tazobactam  3,375 mg IntraVENous Q8H    fluconazole  200 mg IntraVENous Q24H    [Held by provider] heparin (porcine)  5,000 Units SubCUTAneous 3 times per day     Continuous Infusions:   sodium chloride      PN-Adult  3 IN 1 Central Line (Custom) 66.7 mL/hr at 03/16/22 1006    sodium chloride      sodium chloride      sodium chloride         Objective Data:  CBC with Differential:    Lab Results   Component Value Date    WBC 12.3 03/16/2022    RBC 2.95 03/16/2022    HGB 9.0 03/16/2022    HCT 27.6 03/16/2022     03/16/2022    MCV 93.6 03/16/2022    MCH 30.5 03/16/2022    MCHC 32.6 03/16/2022    RDW 17.3 03/16/2022    NRBC 0.9 03/09/2022    METASPCT 2.0 03/12/2022    LYMPHOPCT 10.7 03/16/2022    MONOPCT 5.9 03/16/2022    MYELOPCT 2.0 03/12/2022    BASOPCT 0.2 03/16/2022    MONOSABS 0.72 03/16/2022    LYMPHSABS 1.32 03/16/2022    EOSABS 0.55 03/16/2022    BASOSABS 0.03 03/16/2022     CMP:    Lab Results   Component Value Date     03/16/2022    K 3.0 03/16/2022     03/16/2022    CO2 26 03/16/2022    BUN 11 03/16/2022    CREATININE 0.6 03/16/2022    GFRAA >60 03/16/2022    LABGLOM >60 03/16/2022    GLUCOSE 165 03/16/2022    PROT 6.0 03/16/2022    LABALBU 3.0 03/16/2022    CALCIUM 9.5 03/16/2022    BILITOT 0.5 03/16/2022    ALKPHOS 74 03/16/2022    AST 35 03/16/2022    ALT 25 03/16/2022       Wound Documentation:   Wound 03/10/22 Abdomen Medial;Upper (Active)   Dressing Status Other (Comment) 03/10/22 1319   Drainage Amount Scant 03/10/22 1319   Number of days: 0 Assessment:  1. Sepsis in the setting of dislodged PEG tube with developing abscess and intra-abdominal infection status post new PEG tube insertion  2. MRSA bacteremia  3. Fecal impaction with ongoing improvement  4. Anemia of chronic disease  5. Paroxysmal atrial fibrillation  6. Pubic rami fractures  7. Intertrigo under the breasts bilaterally   8. Failure to thrive with severe protein calorie malnutrition  9. Neuromuscular disorder of undetermined etiology with known psychiatric dysfunction as per the chart  10. Hypothyroidism  11. Essential hypertension  12. Hyperlipidemia      Plan:   Wound care recommendations have been reviewed. 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 As result, we have started TPN and she seems to be tolerating this without complication. We will make adjustments to account for her electrolyte derangements. She is tolerating antibiotics as prescribed by the infectious disease team.  Endoscopic intervention was undertaken yesterday and was found to be relatively normal.  I had a long conversation with the patient's son, Suleiman Brown via the telephone today discussing her antipsychotic medications. He is requesting that we resume Invega injections today. Otherwise, she continues to require extensive work with the therapy teams. She would certainly benefit from LTAC placement and we are awaiting precertification for this process. More than 50% of my  time was spent at the bedside counseling/coordinating care with the patient and/or family with face to face contact. This time was spent reviewing notes and laboratory data as well as instructing and counseling the patient. Time I spent with the family or surrogate(s) is included only if the patient was incapable of providing the necessary information or participating in medical decisions.  I also discussed the differential diagnosis and all of the proposed management plans with the patient and individuals accompanying the patient. Jamia Hancock requires this high level of physician care and nursing [facility] due the complexity of decision management and chance of rapid decline or death. Continued cardiac monitoring and higher level of nursing are required. I am readily available for any further decision-making and intervention.      Krysta Granger DO, D.O., Madeleine Chowdhury  12:29 PM  3/16/2022

## 2022-03-17 ENCOUNTER — APPOINTMENT (OUTPATIENT)
Dept: GENERAL RADIOLOGY | Age: 77
DRG: 862 | End: 2022-03-17
Attending: INTERNAL MEDICINE
Payer: COMMERCIAL

## 2022-03-17 ENCOUNTER — APPOINTMENT (OUTPATIENT)
Dept: CT IMAGING | Age: 77
DRG: 862 | End: 2022-03-17
Attending: INTERNAL MEDICINE
Payer: COMMERCIAL

## 2022-03-17 LAB
ALBUMIN SERPL-MCNC: 3 G/DL (ref 3.5–5.2)
ALP BLD-CCNC: 97 U/L (ref 35–104)
ALT SERPL-CCNC: 22 U/L (ref 0–32)
ANION GAP SERPL CALCULATED.3IONS-SCNC: 11 MMOL/L (ref 7–16)
ANISOCYTOSIS: ABNORMAL
ANISOCYTOSIS: ABNORMAL
AST SERPL-CCNC: 37 U/L (ref 0–31)
BASOPHILS ABSOLUTE: 0 E9/L (ref 0–0.2)
BASOPHILS ABSOLUTE: ABNORMAL E9/L (ref 0–0.2)
BASOPHILS RELATIVE PERCENT: 0.3 % (ref 0–2)
BASOPHILS RELATIVE PERCENT: ABNORMAL % (ref 0–2)
BILIRUB SERPL-MCNC: 0.5 MG/DL (ref 0–1.2)
BLOOD CULTURE, ROUTINE: NORMAL
BUN BLDV-MCNC: 17 MG/DL (ref 6–23)
CALCIUM SERPL-MCNC: 9.7 MG/DL (ref 8.6–10.2)
CHLORIDE BLD-SCNC: 110 MMOL/L (ref 98–107)
CO2: 25 MMOL/L (ref 22–29)
CREAT SERPL-MCNC: 0.6 MG/DL (ref 0.5–1)
CULTURE, BLOOD 2: NORMAL
EOSINOPHILS ABSOLUTE: 0.15 E9/L (ref 0.05–0.5)
EOSINOPHILS ABSOLUTE: ABNORMAL E9/L (ref 0.05–0.5)
EOSINOPHILS RELATIVE PERCENT: 0.9 % (ref 0–6)
EOSINOPHILS RELATIVE PERCENT: ABNORMAL % (ref 0–6)
GFR AFRICAN AMERICAN: >60
GFR NON-AFRICAN AMERICAN: >60 ML/MIN/1.73
GLUCOSE BLD-MCNC: 97 MG/DL (ref 74–99)
HCT VFR BLD CALC: 29.1 % (ref 34–48)
HCT VFR BLD CALC: ABNORMAL % (ref 34–48)
HEMOGLOBIN: 9.2 G/DL (ref 11.5–15.5)
HEMOGLOBIN: ABNORMAL G/DL (ref 11.5–15.5)
HYPOCHROMIA: ABNORMAL
HYPOCHROMIA: ABNORMAL
IMMATURE GRANULOCYTES #: ABNORMAL E9/L
IMMATURE GRANULOCYTES %: ABNORMAL % (ref 0–5)
LACTIC ACID: 1.4 MMOL/L (ref 0.5–2.2)
LYMPHOCYTES ABSOLUTE: 1 E9/L (ref 1.5–4)
LYMPHOCYTES ABSOLUTE: ABNORMAL E9/L (ref 1.5–4)
LYMPHOCYTES RELATIVE PERCENT: 6 % (ref 20–42)
LYMPHOCYTES RELATIVE PERCENT: ABNORMAL % (ref 20–42)
MAGNESIUM: 2.4 MG/DL (ref 1.6–2.6)
MCH RBC QN AUTO: 30.3 PG (ref 26–35)
MCH RBC QN AUTO: ABNORMAL PG (ref 26–35)
MCHC RBC AUTO-ENTMCNC: 31.6 % (ref 32–34.5)
MCHC RBC AUTO-ENTMCNC: ABNORMAL % (ref 32–34.5)
MCV RBC AUTO: 95.7 FL (ref 80–99.9)
MCV RBC AUTO: ABNORMAL FL (ref 80–99.9)
METER GLUCOSE: 105 MG/DL (ref 74–99)
METER GLUCOSE: 105 MG/DL (ref 74–99)
METER GLUCOSE: 125 MG/DL (ref 74–99)
METER GLUCOSE: 141 MG/DL (ref 74–99)
MONOCYTES ABSOLUTE: 0.67 E9/L (ref 0.1–0.95)
MONOCYTES ABSOLUTE: ABNORMAL E9/L (ref 0.1–0.95)
MONOCYTES RELATIVE PERCENT: 4.3 % (ref 2–12)
MONOCYTES RELATIVE PERCENT: ABNORMAL % (ref 2–12)
NEUTROPHILS ABSOLUTE: 14.86 E9/L (ref 1.8–7.3)
NEUTROPHILS ABSOLUTE: ABNORMAL E9/L (ref 1.8–7.3)
NEUTROPHILS RELATIVE PERCENT: 88.8 % (ref 43–80)
NEUTROPHILS RELATIVE PERCENT: ABNORMAL % (ref 43–80)
OVALOCYTES: ABNORMAL
OVALOCYTES: ABNORMAL
PDW BLD-RTO: 17.7 FL (ref 11.5–15)
PDW BLD-RTO: ABNORMAL FL (ref 11.5–15)
PHOSPHORUS: 2 MG/DL (ref 2.5–4.5)
PLATELET # BLD: 431 E9/L (ref 130–450)
PLATELET # BLD: ABNORMAL E9/L (ref 130–450)
PMV BLD AUTO: 10.3 FL (ref 7–12)
PMV BLD AUTO: ABNORMAL FL (ref 7–12)
POIKILOCYTES: ABNORMAL
POLYCHROMASIA: ABNORMAL
POLYCHROMASIA: ABNORMAL
POTASSIUM SERPL-SCNC: 3.8 MMOL/L (ref 3.5–5)
PROCALCITONIN: 0.18 NG/ML (ref 0–0.08)
RBC # BLD: 3.04 E12/L (ref 3.5–5.5)
RBC # BLD: ABNORMAL E12/L (ref 3.5–5.5)
REASON FOR REJECTION: NORMAL
REJECTED TEST: NORMAL
SODIUM BLD-SCNC: 146 MMOL/L (ref 132–146)
TEAR DROP CELLS: ABNORMAL
TOTAL PROTEIN: 6.2 G/DL (ref 6.4–8.3)
VANCOMYCIN TROUGH: 13 MCG/ML (ref 5–16)
WBC # BLD: 16.7 E9/L (ref 4.5–11.5)
WBC # BLD: ABNORMAL E9/L (ref 4.5–11.5)

## 2022-03-17 PROCEDURE — 85025 COMPLETE CBC W/AUTO DIFF WBC: CPT

## 2022-03-17 PROCEDURE — 80202 ASSAY OF VANCOMYCIN: CPT

## 2022-03-17 PROCEDURE — 82962 GLUCOSE BLOOD TEST: CPT

## 2022-03-17 PROCEDURE — 36592 COLLECT BLOOD FROM PICC: CPT

## 2022-03-17 PROCEDURE — 87088 URINE BACTERIA CULTURE: CPT

## 2022-03-17 PROCEDURE — 2700000000 HC OXYGEN THERAPY PER DAY

## 2022-03-17 PROCEDURE — 2500000003 HC RX 250 WO HCPCS: Performed by: INTERNAL MEDICINE

## 2022-03-17 PROCEDURE — 71045 X-RAY EXAM CHEST 1 VIEW: CPT

## 2022-03-17 PROCEDURE — 83605 ASSAY OF LACTIC ACID: CPT

## 2022-03-17 PROCEDURE — 70450 CT HEAD/BRAIN W/O DYE: CPT

## 2022-03-17 PROCEDURE — 2580000003 HC RX 258: Performed by: INTERNAL MEDICINE

## 2022-03-17 PROCEDURE — C9113 INJ PANTOPRAZOLE SODIUM, VIA: HCPCS | Performed by: SURGERY

## 2022-03-17 PROCEDURE — 2060000000 HC ICU INTERMEDIATE R&B

## 2022-03-17 PROCEDURE — 6360000002 HC RX W HCPCS: Performed by: SURGERY

## 2022-03-17 PROCEDURE — 6370000000 HC RX 637 (ALT 250 FOR IP): Performed by: SURGERY

## 2022-03-17 PROCEDURE — 99232 SBSQ HOSP IP/OBS MODERATE 35: CPT | Performed by: SURGERY

## 2022-03-17 PROCEDURE — 87040 BLOOD CULTURE FOR BACTERIA: CPT

## 2022-03-17 PROCEDURE — 36415 COLL VENOUS BLD VENIPUNCTURE: CPT

## 2022-03-17 PROCEDURE — 6360000002 HC RX W HCPCS: Performed by: INTERNAL MEDICINE

## 2022-03-17 PROCEDURE — 80053 COMPREHEN METABOLIC PANEL: CPT

## 2022-03-17 PROCEDURE — 84145 PROCALCITONIN (PCT): CPT

## 2022-03-17 PROCEDURE — 84100 ASSAY OF PHOSPHORUS: CPT

## 2022-03-17 PROCEDURE — 83735 ASSAY OF MAGNESIUM: CPT

## 2022-03-17 PROCEDURE — 2580000003 HC RX 258: Performed by: SURGERY

## 2022-03-17 RX ORDER — FLUCONAZOLE 2 MG/ML
200 INJECTION, SOLUTION INTRAVENOUS EVERY 24 HOURS
Qty: 700 ML | Refills: 0 | Status: SHIPPED | OUTPATIENT
Start: 2022-03-18 | End: 2022-03-22 | Stop reason: HOSPADM

## 2022-03-17 RX ADMIN — PANTOPRAZOLE SODIUM 40 MG: 40 INJECTION, POWDER, FOR SOLUTION INTRAVENOUS at 08:49

## 2022-03-17 RX ADMIN — VANCOMYCIN HYDROCHLORIDE 1250 MG: 10 INJECTION, POWDER, LYOPHILIZED, FOR SOLUTION INTRAVENOUS at 04:22

## 2022-03-17 RX ADMIN — Medication 10 ML: at 20:40

## 2022-03-17 RX ADMIN — PANTOPRAZOLE SODIUM 40 MG: 40 INJECTION, POWDER, FOR SOLUTION INTRAVENOUS at 20:39

## 2022-03-17 RX ADMIN — ANTI-FUNGAL POWDER MICONAZOLE NITRATE TALC FREE: 1.42 POWDER TOPICAL at 20:39

## 2022-03-17 RX ADMIN — FLUCONAZOLE IN SODIUM CHLORIDE 200 MG: 2 INJECTION, SOLUTION INTRAVENOUS at 08:57

## 2022-03-17 RX ADMIN — POTASSIUM CHLORIDE: 2 INJECTION, SOLUTION, CONCENTRATE INTRAVENOUS at 08:52

## 2022-03-17 RX ADMIN — PETROLATUM: 420 OINTMENT TOPICAL at 20:39

## 2022-03-17 RX ADMIN — ANTI-FUNGAL POWDER MICONAZOLE NITRATE TALC FREE: 1.42 POWDER TOPICAL at 08:58

## 2022-03-17 RX ADMIN — HEPARIN 300 UNITS: 100 SYRINGE at 08:45

## 2022-03-17 RX ADMIN — PIPERACILLIN SODIUM AND TAZOBACTAM SODIUM 3375 MG: 3; 375 INJECTION, POWDER, FOR SOLUTION INTRAVENOUS at 16:24

## 2022-03-17 RX ADMIN — HEPARIN 300 UNITS: 100 SYRINGE at 20:40

## 2022-03-17 RX ADMIN — PETROLATUM: 420 OINTMENT TOPICAL at 08:58

## 2022-03-17 RX ADMIN — Medication 10 ML: at 08:30

## 2022-03-17 RX ADMIN — PIPERACILLIN SODIUM AND TAZOBACTAM SODIUM 3375 MG: 3; 375 INJECTION, POWDER, FOR SOLUTION INTRAVENOUS at 06:23

## 2022-03-17 RX ADMIN — POTASSIUM CHLORIDE: 2 INJECTION, SOLUTION, CONCENTRATE INTRAVENOUS at 17:53

## 2022-03-17 RX ADMIN — ALTEPLASE 1 MG: 2.2 INJECTION, POWDER, LYOPHILIZED, FOR SOLUTION INTRAVENOUS at 15:08

## 2022-03-17 ASSESSMENT — PAIN SCALES - PAIN ASSESSMENT IN ADVANCED DEMENTIA (PAINAD)
FACIALEXPRESSION: 0
NEGVOCALIZATION: 0
FACIALEXPRESSION: 0
TOTALSCORE: 0
CONSOLABILITY: 0
BODYLANGUAGE: 0
BREATHING: 0
FACIALEXPRESSION: 0
TOTALSCORE: 0
CONSOLABILITY: 0
BREATHING: 0
BODYLANGUAGE: 1
TOTALSCORE: 2
NEGVOCALIZATION: 0
CONSOLABILITY: 0
NEGVOCALIZATION: 1
BODYLANGUAGE: 0
BREATHING: 0

## 2022-03-17 ASSESSMENT — PAIN SCALES - GENERAL
PAINLEVEL_OUTOF10: 0
PAINLEVEL_OUTOF10: 0
PAINLEVEL_OUTOF10: 2

## 2022-03-17 NOTE — PROGRESS NOTES
Surgery Progress Note            Chief complaint:   No chief complaint on file.      Patient Active Problem List   Diagnosis    Odontoid fracture (HCC)    Hypothyroid    Multiple rib fractures    TMJ (temporomandibular joint syndrome)    Bipolar disorder (HCC)    Sciatica    Hiatal hernia    Abnormality of gait and mobility    Trigeminal neuralgia    Headache    Cervical neck pain with evidence of disc disease    Vomiting    Hypokalemia    Intra-abdominal infection    MRSA bacteremia    Paroxysmal atrial fibrillation (HCC)    Hypotension    Hypernatremia    Moderate protein-calorie malnutrition (HCC)    Sepsis ([address]       S: no acute changes    O:   Vitals:    03/17/22 0415   BP: 132/77   Pulse: 108   Resp: 22   Temp: 98.3 °F (36.8 °C)   SpO2: 96%       Intake/Output Summary (Last 24 hours) at 3/17/2022 0745  Last data filed at 3/17/2022 0715  Gross per 24 hour   Intake 2073.34 ml   Output 1050 ml   Net 1023.34 ml           Labs:  Lab Results   Component Value Date    WBC 13.1 03/17/2022    WBC 12.3 03/16/2022    WBC 12.0 03/15/2022    HGB 8.5 03/17/2022    HGB 9.2 03/16/2022    HGB 9.0 03/16/2022    HCT 28.2 03/17/2022    HCT 28.5 03/16/2022    HCT 27.6 03/16/2022     Lab Results   Component Value Date    CREATININE 0.6 03/16/2022    BUN 11 03/16/2022     03/16/2022    K 4.3 03/16/2022     03/16/2022    CO2 26 03/16/2022     Lab Results   Component Value Date    LIPASE 29 06/23/2014    AMYLASE 202 06/24/2014    AMYLASE 25 06/23/2014         Physical exam:   /77   Pulse 108   Temp 98.3 °F (36.8 °C) (Infrared)   Resp 22   Ht 4' 11\" (1.499 m)   Wt 116 lb 14.4 oz (53 kg)   SpO2 96%   BMI 23.61 kg/m²   General appearance: NAD  Head: NCAT  Neck: supple, no masses  Lungs: equal chest rise bilateral  Heart: S1S2 present  Abdomen: soft, minimally tender, nondistended,  Peg in place, wound from old site improved but still with drainage  Skin; no lesions  Gu: no cva tenderness  Extremities: extremities normal, atraumatic, no cyanosis or edema    A:  Peg site complication with gastrocutaneous fistula    P: cont npo and tpn, if no improvement soon may need surgical closure.     Gavino Mccall MD, MD  3/17/2022

## 2022-03-17 NOTE — PROGRESS NOTES
Pharmacy Consultation Note  (Antibiotic Dosing and Monitoring)    Initial consult date: 3/9/22  Consulting physician/provider: Dr. Jono Tiwari  Drug: Vancomycin  Indication: MRSA bacteremia    Age/  Gender Height Weight IBW  Allergy Information   76 y.o./female 4' 11\" (149.9 cm) 115 lb (52.2 kg)     Patient must be at least 60 in tall to calculate ideal body weight   Abilify [aripiprazole], Depakote [divalproex sodium], Dye [iodides], Geodon [ziprasidone hcl], Levsin [hyoscyamine], Lithium, Neurontin [gabapentin], Risperidone and related, Tegretol [carbamazepine], Tramadol, Trileptal [oxcarbazepine], and Zyprexa [olanzapine]      Renal Function:  Recent Labs     03/15/22  1025 03/16/22  0550   BUN 9 11   CREATININE 0.6 0.6       Intake/Output Summary (Last 24 hours) at 3/17/2022 1144  Last data filed at 3/17/2022 1039  Gross per 24 hour   Intake 2116.2 ml   Output 1050 ml   Net 1066.2 ml       Vancomycin Monitoring:  Trough:    Recent Labs     03/17/22  0340   VANCOTROUGH 13.0     Random:  No results for input(s): VANCORANDOM in the last 72 hours. Vancomycin Administration Times:  Recent vancomycin administrations                   vancomycin (VANCOCIN) 1,250 mg in dextrose 5 % 250 mL IVPB (mg) 1,250 mg New Bag 03/12/22 0528    vancomycin (VANCOCIN) 1,000 mg in dextrose 5 % 250 mL IVPB (mg) 1,000 mg New Bag 03/11/22 0331     1,000 mg New Bag 03/10/22 1840     1,000 mg New Bag  0224     1,000 mg New Bag 03/09/22 1636                  Assessment:  · Patient is a 67 y.o. female who has been initiated on vancomycin for MRSA bacteremia per chart review. The positive cultures are from an outside facility that were reported, so has not been treated. Repeat blood cultures have been collected here and show NGTD.   · CrCl ~40-50 mL/min  · To dose vancomycin, pharmacy will be utilizing [employer] calculation software for goal AUC/CLIFF 400-600 mg/L-hr   · 3/11: Level collected yesterday @ 8675 (instead of today @ 1430) = 13.2 mcg/mL, AUC/CLIFF 717  · 3/17: Trough @ 340 = 13 mcg/mL;     Plan:  · Vancomycin 1,250 IV every 24 hours  · Repeat trough as needed  · Will continue to monitor renal function   · Clinical pharmacy will continue to follow      Sonal Whalen PharmD 3/17/2022 11:44 AM   [phone]

## 2022-03-17 NOTE — CARE COORDINATION
3/17/2022 APPROVED; [facility]. Pt on TPN, IV atb. Wound care for old PEG site. -Surgeon is considering surgical closure if not healing. Son/POA returned to [facility], will be unavailable Thurs due to his own medical procedure. [facility] rep aware. Son concerned about pt not receiving psych meds. Pt long term pt at [place], bed being held but could not do TPN. Ambulance sheet in soft blue chart. CM/SS assigned to follow.  Electronically signed by LAURA Garces on 3/17/2022 at 10:46 AM

## 2022-03-17 NOTE — PROGRESS NOTES
[facility] [employer] [facility]  [facility]  Progress Note    NAME: Rajinder Gonzalez  MR:  [id number]  :   1945  DATE OF SERVICE:22    This is a face to face encounter with Rajinder Gonzalez 67 y.o. female on 22    CHIEF COMPLAINT     ID following   HISTORY OF PRESENT ILLNESS   Pt seen and examined  22   In bed frail   On o2 on tpn no leakage from peg      In bed on o2 on TPN  picc peg no d/c    3/15/2022  In bed pale falls asleep easily    3/14/2022  Awake- in bed  PEG tube is not working  Patient had bloody stools this am.   She has been afebrile. Tachy     Son at bedside and updated. 3/13/2022  In bed working with pt  Son present and updated  Tmax 99.7 2L weakness    3/12/2022  PER STAFF PT HAD A FEVER ATTP861.8 4L  HER TF IS GOING THROUGH HER STOMA BAG   SHE HAS NO C/O F/C/N/V/D    3/11/2022  More awake and alert has no c/o   Afebrile 3L    3/10/2022  Seen postop EGD ESOPHAGOGASTRODUODENOSCOPY PEG TUBE INSERTION, exam under anesthesia, manual fecal disimpaction, control perianal hemorrhage, rectal packing     nad  Has pain     Patient is tolerating medications. No reported adverse drug reactions. REVIEW OF SYSTEMS     As stated above in the chief complaint, otherwise negative.   CURRENT MEDICATIONS      lidocaine PF  5 mL IntraDERmal Once    sodium chloride flush  5-40 mL IntraVENous 2 times per day    heparin flush  3 mL IntraVENous 2 times per day    levothyroxine  50 mcg Oral Daily    sertraline  100 mg Oral Daily    buPROPion  50 mg Oral Q6H    vancomycin  1,250 mg IntraVENous Q24H    pantoprazole  40 mg IntraVENous BID    miconazole   Topical BID    Petrolatum   Topical BID    piperacillin-tazobactam  3,375 mg IntraVENous Q8H    fluconazole  200 mg IntraVENous Q24H    [Held by provider] heparin (porcine)  5,000 Units SubCUTAneous 3 times per day     Continuous Infusions:   PN-Adult  3 IN 1 Central Line (Custom)      sodium chloride      sodium chloride      sodium chloride      sodium chloride       PRN Meds:sodium phosphate IVPB **OR** sodium phosphate IVPB, acetaminophen, sodium chloride, potassium chloride, sodium chloride flush, sodium chloride, heparin flush, sodium chloride, acetaminophen, Petrolatum **AND** Petrolatum, sodium chloride, morphine, ondansetron, albuterol    PHYSICAL EXAM     /75   Pulse 102   Temp 99.3 °F (37.4 °C) (Axillary)   Resp 22   Ht 4' 11\" (1.499 m)   Wt 116 lb 14.4 oz (53 kg)   SpO2 96%   BMI 23.61 kg/m²   Temp  Av.3 °F (36.8 °C)  Min: 97.2 °F (36.2 °C)  Max: 99.3 °F (37.4 °C)  Constitutional:  The patient is arousable Pale-   Skin:    Warm and dry. HEENT:     AT/NC  Chest:   No use of accessory muscles to breathe. Symmetrical expansion. Dec bs 2l  Cardiovascular:  S1 and S2 are rhythmic and regular. Abdomen:   Positive bowel sounds to auscultation. tender peg binder stoma   Extremities:      Edema. Dec rom   CNS     no acute distress   Lines: piv  Carrera yellow CLEAR    DIAGNOSTIC RESULTS   Radiology:  3/14 ct a/p   1.  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 2.  No intra-abdominal or abdominal wall abscess. 3.  Stable pelvic fractures on the left. 4.  No evidence of intestinal obstruction or fecal impaction. 5.  Slight increase in size of small bilateral pleural effusions. 6.  Gastrostomy tube and urinary Carrera catheter appear to be in appropriate position  . Recent Labs     03/15/22  1025 03/15/22  1630 22  0550 22  1400 22  0600   WBC 12.0*  --  12.3*  --  13.1*   RBC 2.20*  --  2.95*  --  2.48*   HGB 7.0*   < > 9.0* 9.2* 8.5*   HCT 21.8*   < > 27.6* 28.5* 28.2*   MCV 99.1  --  93.6  --  113.7*   MCH 31.8  --  30.5  --  34.3   MCHC 32.1  --  32.6  --  30.1*   RDW 14.6  --  17.3*  --  19.0*     --  355  --  365   MPV 10.6  --  10.3  --  10.9    < > = values in this interval not displayed.      Recent Labs     03/15/22  1025 22  0550 03/16/22  1600    144  --    K 3.3* 3.0* 4.3    105  --    CO2 27 26  --    BUN 9 11  --    CREATININE 0.6 0.6  --    GLUCOSE 94 165*  --    PROT 6.0* 6.0*  --    LABALBU 3.0* 3.0*  --    CALCIUM 9.8 9.5  --    BILITOT 0.7 0.5  --    ALKPHOS 118* 74  --    AST 40* 35*  --    ALT 23 25  --      Lab Results   Component Value Date    CRP 31.9 (H) 03/08/2022     Lab Results   Component Value Date    SEDRATE 109 (H) 03/08/2022     Recent Labs     03/15/22  1025 03/16/22  0550   AST 40* 35*   ALT 23 25   TRIG  --  121     Microbiology:   No results for input(s): COVID19 in the last 72 hours. Lab Results   Component Value Date    BC 24 Hours no growth 03/12/2022    BC 5 Days no growth 03/08/2022    BLOODCULT2 24 Hours no growth 03/12/2022    BLOODCULT2 5 Days no growth 03/08/2022    ORG Staphylococcus aureus 03/08/2022    ORG Klebsiella oxytoca 07/01/2014     Travessa Circe 852        urine 2/16/2022       FINAL IMPRESSION     Admitted for Sepsis   On treatment for MRSA sepsis/uti KADEN neg   Peg infection s/p reinsertion new site NPO on TPN  Gib/fecal impaction   leukocytosis reactive better  FEVERS     Plan:   vancomycin (VANCOCIN) 1,250 mg in dextrose 5 % 250 mL IVPB, Q24H  miconazole (MICOTIN) 2 % powder, BID  piperacillin-tazobactam (ZOSYN) 3,375 mg in dextrose 5 % 50 mL IVPB extended infusion (mini-bag), Q8H  fluconazole (DIFLUCAN) in 0.9 % sodium chloride IVPB 200 mg, Q24H      · Continue vancomycin for 4 weeks from 3/8/2022  · Pharmacy dosing   · Continue zosyn day #8/10  · Continue diflucan for now     · GI/surgery  following    · Continue wound care   · Monitor labs-  For select     Imaging and labs were reviewed per medical records. Thank you for involving me in the care of Mae Miles will continue to follow. Please do not hesitate to call for any questions or concerns.     Electronically signed by Jyoti Hollis MD on 3/17/2022 at 11:50 AM

## 2022-03-18 LAB
ALBUMIN SERPL-MCNC: 2.9 G/DL (ref 3.5–5.2)
ALP BLD-CCNC: 69 U/L (ref 35–104)
ALT SERPL-CCNC: 21 U/L (ref 0–32)
ANION GAP SERPL CALCULATED.3IONS-SCNC: 10 MMOL/L (ref 7–16)
AST SERPL-CCNC: 28 U/L (ref 0–31)
BASOPHILS ABSOLUTE: 0.04 E9/L (ref 0–0.2)
BASOPHILS RELATIVE PERCENT: 0.2 % (ref 0–2)
BILIRUB SERPL-MCNC: 0.4 MG/DL (ref 0–1.2)
BUN BLDV-MCNC: 18 MG/DL (ref 6–23)
CALCIUM SERPL-MCNC: 9.7 MG/DL (ref 8.6–10.2)
CHLORIDE BLD-SCNC: 108 MMOL/L (ref 98–107)
CO2: 26 MMOL/L (ref 22–29)
CREAT SERPL-MCNC: 0.5 MG/DL (ref 0.5–1)
EOSINOPHILS ABSOLUTE: 1.12 E9/L (ref 0.05–0.5)
EOSINOPHILS RELATIVE PERCENT: 6.9 % (ref 0–6)
GFR AFRICAN AMERICAN: >60
GFR NON-AFRICAN AMERICAN: >60 ML/MIN/1.73
GLUCOSE BLD-MCNC: 139 MG/DL (ref 74–99)
HCT VFR BLD CALC: 27.5 % (ref 34–48)
HEMOGLOBIN: 8.6 G/DL (ref 11.5–15.5)
IMMATURE GRANULOCYTES #: 0.22 E9/L
IMMATURE GRANULOCYTES %: 1.4 % (ref 0–5)
LYMPHOCYTES ABSOLUTE: 2.13 E9/L (ref 1.5–4)
LYMPHOCYTES RELATIVE PERCENT: 13.2 % (ref 20–42)
MCH RBC QN AUTO: 29.9 PG (ref 26–35)
MCHC RBC AUTO-ENTMCNC: 31.3 % (ref 32–34.5)
MCV RBC AUTO: 95.5 FL (ref 80–99.9)
METER GLUCOSE: 116 MG/DL (ref 74–99)
METER GLUCOSE: 133 MG/DL (ref 74–99)
METER GLUCOSE: 135 MG/DL (ref 74–99)
METER GLUCOSE: 448 MG/DL (ref 74–99)
MONOCYTES ABSOLUTE: 1.23 E9/L (ref 0.1–0.95)
MONOCYTES RELATIVE PERCENT: 7.6 % (ref 2–12)
NEUTROPHILS ABSOLUTE: 11.42 E9/L (ref 1.8–7.3)
NEUTROPHILS RELATIVE PERCENT: 70.7 % (ref 43–80)
PDW BLD-RTO: 17.3 FL (ref 11.5–15)
PLATELET # BLD: 424 E9/L (ref 130–450)
PMV BLD AUTO: 10.2 FL (ref 7–12)
POTASSIUM SERPL-SCNC: 3.4 MMOL/L (ref 3.5–5)
POTASSIUM SERPL-SCNC: 4.8 MMOL/L (ref 3.5–5)
RBC # BLD: 2.88 E12/L (ref 3.5–5.5)
SODIUM BLD-SCNC: 144 MMOL/L (ref 132–146)
TOTAL PROTEIN: 6.1 G/DL (ref 6.4–8.3)
WBC # BLD: 16.2 E9/L (ref 4.5–11.5)

## 2022-03-18 PROCEDURE — 97110 THERAPEUTIC EXERCISES: CPT | Performed by: PHYSICAL THERAPIST

## 2022-03-18 PROCEDURE — 6360000002 HC RX W HCPCS: Performed by: SURGERY

## 2022-03-18 PROCEDURE — 82962 GLUCOSE BLOOD TEST: CPT

## 2022-03-18 PROCEDURE — 6360000002 HC RX W HCPCS: Performed by: INTERNAL MEDICINE

## 2022-03-18 PROCEDURE — 36415 COLL VENOUS BLD VENIPUNCTURE: CPT

## 2022-03-18 PROCEDURE — 6370000000 HC RX 637 (ALT 250 FOR IP): Performed by: SURGERY

## 2022-03-18 PROCEDURE — 80053 COMPREHEN METABOLIC PANEL: CPT

## 2022-03-18 PROCEDURE — 99221 1ST HOSP IP/OBS SF/LOW 40: CPT

## 2022-03-18 PROCEDURE — 84132 ASSAY OF SERUM POTASSIUM: CPT

## 2022-03-18 PROCEDURE — 2580000003 HC RX 258: Performed by: SURGERY

## 2022-03-18 PROCEDURE — 2700000000 HC OXYGEN THERAPY PER DAY

## 2022-03-18 PROCEDURE — C9113 INJ PANTOPRAZOLE SODIUM, VIA: HCPCS | Performed by: SURGERY

## 2022-03-18 PROCEDURE — 2580000003 HC RX 258: Performed by: INTERNAL MEDICINE

## 2022-03-18 PROCEDURE — 2500000003 HC RX 250 WO HCPCS: Performed by: INTERNAL MEDICINE

## 2022-03-18 PROCEDURE — 36592 COLLECT BLOOD FROM PICC: CPT

## 2022-03-18 PROCEDURE — 2060000000 HC ICU INTERMEDIATE R&B

## 2022-03-18 PROCEDURE — 85025 COMPLETE CBC W/AUTO DIFF WBC: CPT

## 2022-03-18 RX ORDER — SALIVA STIMULANT COMB. NO.3
SPRAY, NON-AEROSOL (ML) MUCOUS MEMBRANE PRN
Status: DISCONTINUED | OUTPATIENT
Start: 2022-03-18 | End: 2022-03-18 | Stop reason: CLARIF

## 2022-03-18 RX ADMIN — PETROLATUM: 420 OINTMENT TOPICAL at 08:52

## 2022-03-18 RX ADMIN — POTASSIUM CHLORIDE: 2 INJECTION, SOLUTION, CONCENTRATE INTRAVENOUS at 18:06

## 2022-03-18 RX ADMIN — HEPARIN 300 UNITS: 100 SYRINGE at 21:33

## 2022-03-18 RX ADMIN — HEPARIN 300 UNITS: 100 SYRINGE at 08:51

## 2022-03-18 RX ADMIN — Medication 10 ML: at 21:34

## 2022-03-18 RX ADMIN — ANTI-FUNGAL POWDER MICONAZOLE NITRATE TALC FREE: 1.42 POWDER TOPICAL at 08:51

## 2022-03-18 RX ADMIN — POTASSIUM CHLORIDE 20 MEQ: 29.8 INJECTION, SOLUTION INTRAVENOUS at 10:02

## 2022-03-18 RX ADMIN — FLUCONAZOLE IN SODIUM CHLORIDE 200 MG: 2 INJECTION, SOLUTION INTRAVENOUS at 08:50

## 2022-03-18 RX ADMIN — ANTI-FUNGAL POWDER MICONAZOLE NITRATE TALC FREE: 1.42 POWDER TOPICAL at 21:33

## 2022-03-18 RX ADMIN — MORPHINE SULFATE 2 MG: 2 INJECTION, SOLUTION INTRAMUSCULAR; INTRAVENOUS at 00:46

## 2022-03-18 RX ADMIN — PIPERACILLIN SODIUM AND TAZOBACTAM SODIUM 3375 MG: 3; 375 INJECTION, POWDER, FOR SOLUTION INTRAVENOUS at 00:48

## 2022-03-18 RX ADMIN — MORPHINE SULFATE 2 MG: 2 INJECTION, SOLUTION INTRAMUSCULAR; INTRAVENOUS at 08:44

## 2022-03-18 RX ADMIN — PANTOPRAZOLE SODIUM 40 MG: 40 INJECTION, POWDER, FOR SOLUTION INTRAVENOUS at 21:29

## 2022-03-18 RX ADMIN — VANCOMYCIN HYDROCHLORIDE 1250 MG: 10 INJECTION, POWDER, LYOPHILIZED, FOR SOLUTION INTRAVENOUS at 04:24

## 2022-03-18 RX ADMIN — PANTOPRAZOLE SODIUM 40 MG: 40 INJECTION, POWDER, FOR SOLUTION INTRAVENOUS at 08:57

## 2022-03-18 RX ADMIN — PIPERACILLIN SODIUM AND TAZOBACTAM SODIUM 3375 MG: 3; 375 INJECTION, POWDER, FOR SOLUTION INTRAVENOUS at 07:13

## 2022-03-18 RX ADMIN — SODIUM CHLORIDE 25 ML: 9 INJECTION, SOLUTION INTRAVENOUS at 04:26

## 2022-03-18 RX ADMIN — POTASSIUM CHLORIDE 20 MEQ: 29.8 INJECTION, SOLUTION INTRAVENOUS at 09:19

## 2022-03-18 RX ADMIN — Medication 10 ML: at 08:51

## 2022-03-18 RX ADMIN — PETROLATUM: 420 OINTMENT TOPICAL at 21:33

## 2022-03-18 RX ADMIN — PIPERACILLIN SODIUM AND TAZOBACTAM SODIUM 3375 MG: 3; 375 INJECTION, POWDER, FOR SOLUTION INTRAVENOUS at 16:13

## 2022-03-18 ASSESSMENT — PAIN SCALES - PAIN ASSESSMENT IN ADVANCED DEMENTIA (PAINAD)
BREATHING: 1
CONSOLABILITY: 0
TOTALSCORE: 6
BREATHING: 0
FACIALEXPRESSION: 0
TOTALSCORE: 1
BODYLANGUAGE: 0
BREATHING: 0
CONSOLABILITY: 1
NEGVOCALIZATION: 0
BODYLANGUAGE: 0
NEGVOCALIZATION: 1
CONSOLABILITY: 0
BREATHING: 0
BODYLANGUAGE: 1
TOTALSCORE: 5
NEGVOCALIZATION: 1
FACIALEXPRESSION: 2
FACIALEXPRESSION: 0
NEGVOCALIZATION: 1
FACIALEXPRESSION: 1
BODYLANGUAGE: 2
TOTALSCORE: 0
CONSOLABILITY: 1

## 2022-03-18 ASSESSMENT — PAIN SCALES - GENERAL
PAINLEVEL_OUTOF10: 5
PAINLEVEL_OUTOF10: 1
PAINLEVEL_OUTOF10: 5
PAINLEVEL_OUTOF10: 0
PAINLEVEL_OUTOF10: 6

## 2022-03-18 ASSESSMENT — ENCOUNTER SYMPTOMS: TACHYPNEA: 1

## 2022-03-18 NOTE — PLAN OF CARE
Problem: Falls - Risk of:  Goal: Will remain free from falls  Description: Will remain free from falls  3/18/2022 0110 by Anu Holloway RN  Outcome: Met This Shift     Problem: Falls - Risk of:  Goal: Absence of physical injury  Description: Absence of physical injury  3/18/2022 0110 by Anu Holloway RN  Outcome: Met This Shift     Problem: Skin Integrity:  Goal: Will show no infection signs and symptoms  Description: Will show no infection signs and symptoms  3/18/2022 0110 by Anu Holloway RN  Outcome: Met This Shift     Problem: Skin Integrity:  Goal: Absence of new skin breakdown  Description: Absence of new skin breakdown  3/18/2022 0110 by Anu Holloway RN  Outcome: Met This Shift     Problem: Pain:  Goal: Pain level will decrease  Description: Pain level will decrease  3/18/2022 0110 by Anu Holloway RN  Outcome: Met This Shift     Problem: Pain:  Goal: Control of acute pain  Description: Control of acute pain  3/18/2022 0110 by Anu Holloway RN  Outcome: Met This Shift     Problem: Pain:  Goal: Control of chronic pain  Description: Control of chronic pain  3/18/2022 0110 by Anu Holloway RN  Outcome: Met This Shift

## 2022-03-18 NOTE — CARE COORDINATION
PRECERT for LTACH was only good for 24 hours- - has to be resent today if discharge today.  Electronically signed by Sonja Miles RN-BC on 3/18/2022 at 8:06 AM

## 2022-03-18 NOTE — PROGRESS NOTES
Physical Therapy Treatment Note/Plan of Care    Room #:  8155/2632-27  Patient Name: Chuck Lee  YOB: 1945  MRN: [id number]    Date of Service: 3/18/2022     Tentative placement recommendation: Subacute rehab  Equipment recommendation: To be determined      Evaluating Physical Therapist: Mickie Yusuf #[id number]      Specific Provider Orders/Date/Referring Provider :   03/08/22 2030   PT eval and treat Start: 03/08/22 2030, End: 03/08/22 2030, ONE TIME, Standing Count: 1 Occurrences, R    Nimo Jasso DO  Admitting Diagnosis:   Sepsis, unspecified organism [A41.9]  Sepsis ([facility] [A41.9]    Surgery:  sigmoidoscopy 3/15  PEG tube placement on 3/3/202    Patient Active Problem List   Diagnosis    Odontoid fracture ([address]    Hypothyroid    Multiple rib fractures    TMJ (temporomandibular joint syndrome)    Bipolar disorder ([address]    Sciatica    Hiatal hernia    Abnormality of gait and mobility    Trigeminal neuralgia    Headache    Cervical neck pain with evidence of disc disease    Vomiting    Hypokalemia    Intra-abdominal infection    MRSA bacteremia    Paroxysmal atrial fibrillation (HCC)    Hypotension    Hypernatremia    Moderate protein-calorie malnutrition (HCC)    Sepsis (HCC)        ASSESSMENT of Current Deficits Patient exhibits decreased strength, balance and endurance impairing functional mobility, transfers, gait distance and tolerance to activity Nursing cleared patient for range of motion exercises in bed. Patient able to tolerate AAROM exercises. Patient able to maintain stable vitals throughout treatment. Per nursing and chart, patient being discharged with hospice. Patient requires skilled therapy to increase strength and improve balance for safe functional mobility, to decrease risk of falls, and to meet goals at discharge.          PHYSICAL THERAPY  PLAN OF CARE       Physical therapy plan of care is established based on physician order,  patient diagnosis and clinical assessment    Current Treatment Recommendations:    -Bed Mobility: Lower extremity exercises   -Sitting Balance: Incorporate reaching activities to activate trunk muscles , Hands on support to maintain midline , Facilitate active trunk muscle engagement  and Facilitate postural control in all planes   -Standing Balance: Perform strengthening exercises in standing to promote motor control with or without upper extremity support  and Instruct patient on adequate base of support to maintain balance  -Transfers: Provide instruction on proper hand and foot position for adequate transfer of weight onto lower extremities and use of gait device if needed and Cues for hand placement, technique and safety. Provide stabilization to prevent fall     PT long term treatment goals are located in below grid    Patient and or family understand(s) diagnosis, prognosis, and plan of care. Frequency of treatments: Patient will be seen  2-3 times/week.          Prior Level of Function: Patient out of bed to w/c- no history of type of transfer  Rehab Potential: good  for baseline    Past medical history:   Past Medical History:   Diagnosis Date    Allergic     Anxiety disorder     Arthritis     Difficulty walking     Dysphagia     Hyperlipidemia     Hypertension     Interstitial cystitis     Lack of coordination     Mitral valve prolapse     pt. has a mitral valve prolapse    Neuromuscular disorder (HCC)     Other disorders of kidney and ureter in diseases classified elsewhere     Pelvis fracture (HCC)     multiple fxs pelvis    Psychiatric problem     Thyroid disease     Trigeminal neuralgia     Unspecified dementia without behavioral disturbance ([facility]      Past Surgical History:   Procedure Laterality Date    FRACTURE SURGERY      GASTROSTOMY TUBE PLACEMENT N/A 3/3/2022    EGD PEG TUBE PLACEMENT performed by Nathanael Ramires MD at [address] [place] [address] 3/15/2022    [facility] performed by Austen Felipe MD at [facility] [address]     TRANSESOPHAGEAL ECHOCARDIOGRAM N/A 3/11/2022    TRANSESOPHAGEAL ECHOCARDIOGRAM performed by Amrita Asif MD at [facility] N/A 3/10/2022    EGD ESOPHAGOGASTRODUODENOSCOPY PEG TUBE INSERTION; EVACUATION OF STOOL IMPACTON performed by Alejandra Higginbotham MD at [facility]:    Precautions:  falls , abdominal drain, armenta, O2, Incontinent of bowel  Social history: Came to hospital from [facility] ED and reported there from nursing home. Patient lis a poor historian. Equipment owned: Wheelchair,      [address] [facility] [address]   How much difficulty turning over in bed?: A Lot  How much difficulty sitting down on / standing up from a chair with arms?: Unable  How much difficulty moving from lying on back to sitting on side of bed?: A Lot  How much help from another person moving to and from a bed to a chair?: Total  How much help from another person needed to walk in hospital room?: Total  How much help from another person for climbing 3-5 steps with a railing?: Total  AM-PAC Inpatient Mobility Raw Score : 8  AM-PAC Inpatient T-Scale Score : 28.52  Mobility Inpatient CMS 0-100% Score: 86.62  Mobility Inpatient CMS G-Code Modifier : CM    Nursing cleared patient for PT treatment. OBJECTIVE;   Initial Evaluation  Date: 3/10/2022 Treatment Date:  3/18/2022     Short Term/ Long Term   Goals   Was pt agreeable to Eval/treatment?  Yes yes To be met in 5 days   Pain level   0/10   Neck pain, no number assigned    Bed Mobility    Rolling: Maximal assist of 1    Supine to sit: Maximal assist of  2    Sit to supine: Maximal assist of  2    Scooting: Maximal assist of 1   Rolling: Not assessed    Supine to sit: Not assessed    Sit to supine: Not assessed    Scooting: Not assessed     Rolling: Minimal assist of 1    Supine to sit: Maximal assist of 1 Sit to supine: Maximal assist of 1    Scooting: Moderate assist of 1     Transfers Sit to stand: Not assessed  d/t to pt overall debility, decreased activity tolerance, balance deficits, safety and fall risk. Sit to stand: Not assessed  due to weakness and debility     Sit to stand: Maximal assist of 1    Ambulation    not assessed at this time due to not appropriate. Not assessed  not appropriate at this time. To re-assess at a later time if appropriate     ROM Within functional limits    Increase range of motion 10% of affected joints    Strength BUE:  refer to OT eval  RLE:  3/5  LLE:  3/5  Increase strength in affected mm groups by 1/3 grade   Balance Sitting EOB:  poor, posterior lean, max assist x2 to remain upright   Dynamic Standing:  not assessed  Sitting EOB: poor   Dynamic Standing: not assessed    Sitting EOB:  fair   Dynamic Standing: fair      Patient is Alert & Oriented x person and time and follows one step directions    Sensation:  Patient  denies numbness/tingling   Edema:  no   Endurance: poor    Vitals: room air   Blood Pressure at rest  Blood Pressure during session    Heart Rate at rest 92 Heart Rate during session    SPO2 at rest 91%  SPO2 during session 90-92%     Patient education  Patient educated on role of Physical Therapy, risks of immobility, safety and plan of care,  importance of mobility while in hospital , ankle pumps, quad set and glut set for edema control, blood clot prevention, safety  and positioning for skin integrity and comfort     Patient response to education:   Pt verbalized understanding Pt demonstrated skill Pt requires further education in this area   Yes Partial Yes      Treatment:  Patient practiced and was instructed/facilitated in the following treatment: Patient assisted with supine exercises for upper and lower extremity.           Therapeutic Exercises: AAROM ankle pumps, heel slide, straight leg raise, shoulder elevation and elbow flexion/extension  x 10-15 reps. At end of session, patient in bed with nursing present call light and phone within reach,  all lines and tubes intact, nursing notified. Patient would benefit from continued skilled Physical Therapy to improve functional independence and quality of life.          Patient's/ family goals   none stated    Time in  236  Time out  249    Total Treatment Time  13  minutes     CPT codes:  Therapeutic exercises (88526)   13 minutes  1 unit(s)     Valente Stephens, Student Physical Therapist

## 2022-03-18 NOTE — PROGRESS NOTES
[facility]  [facility]  Progress Note    NAME: Rajinder Gonzalez  MR:  [id number]  :   1945  DATE OF SERVICE:22    This is a face to face encounter with Rajinder Gonzalez 67 y.o. female on 22    CHIEF COMPLAINT     ID following   HISTORY OF PRESENT ILLNESS   Pt seen and examined  22   Patient has been afebrile. In bed- on room air. Arousable. On TPN.     3/17/2022  In bed frail   On o2 on tpn no leakage from peg    316/  In bed on o2 on TPN  picc peg no d/c    3/15/2022  In bed pale falls asleep easily    3/14/2022  Awake- in bed  PEG tube is not working  Patient had bloody stools this am.   She has been afebrile. Tachy     Son at bedside and updated. 3/13/2022  In bed working with pt  Son present and updated  Tmax 99.7 2L weakness    3/12/2022  PER STAFF PT HAD A FEVER ILJN343.8 4L  HER TF IS GOING THROUGH HER STOMA BAG   SHE HAS NO C/O F/C/N/V/D    3/11/2022  More awake and alert has no c/o   Afebrile 3L    3/10/2022  Seen postop EGD ESOPHAGOGASTRODUODENOSCOPY PEG TUBE INSERTION, exam under anesthesia, manual fecal disimpaction, control perianal hemorrhage, rectal packing     nad  Has pain     Patient is tolerating medications. No reported adverse drug reactions. REVIEW OF SYSTEMS     As stated above in the chief complaint, otherwise negative.   CURRENT MEDICATIONS      lidocaine PF  5 mL IntraDERmal Once    sodium chloride flush  5-40 mL IntraVENous 2 times per day    heparin flush  3 mL IntraVENous 2 times per day    levothyroxine  50 mcg Oral Daily    sertraline  100 mg Oral Daily    buPROPion  50 mg Oral Q6H    vancomycin  1,250 mg IntraVENous Q24H    pantoprazole  40 mg IntraVENous BID    miconazole   Topical BID    Petrolatum   Topical BID    piperacillin-tazobactam  3,375 mg IntraVENous Q8H    fluconazole  200 mg IntraVENous Q24H    [Held by provider] heparin (porcine)  5,000 Units SubCUTAneous 3 times per day     Continuous Infusions:   PN-Adult  3 IN 1 Central Line (Custom)      PN-Adult  3 IN 1 Central Line (Custom) 50 mL/hr at 22 1433    sodium chloride      sodium chloride Stopped (22 0430)    sodium chloride      sodium chloride       PRN Meds:glycerin moisturizing mouth spray, sodium phosphate IVPB **OR** sodium phosphate IVPB, acetaminophen, sodium chloride, potassium chloride, sodium chloride flush, sodium chloride, heparin flush, sodium chloride, acetaminophen, Petrolatum **AND** Petrolatum, sodium chloride, morphine, ondansetron, albuterol    PHYSICAL EXAM     BP (!) 130/59   Pulse 95   Temp 97.5 °F (36.4 °C)   Resp 18   Ht 4' 11\" (1.499 m)   Wt 115 lb 6 oz (52.3 kg)   SpO2 91%   BMI 23.30 kg/m²   Temp  Av.2 °F (36.8 °C)  Min: 97.5 °F (36.4 °C)  Max: 98.6 °F (37 °C)  Constitutional:  The patient is arousable Pale- on TPN  Skin:    Warm and dry. HEENT:     AT/NC  Chest:   No use of accessory muscles to breathe. Symmetrical expansion. Dec bs 2l  Cardiovascular:  S1 and S2 are rhythmic and regular. Abdomen:   Positive bowel sounds to auscultation. tender peg binder stoma   Extremities:      Edema. Dec rom   CNS     no acute distress   Lines: left upper arm with line- no phlebitis. Carrera yellow CLEAR    DIAGNOSTIC RESULTS   Radiology:  3/14 ct a/p   1.  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 2.  No intra-abdominal or abdominal wall abscess. 3.  Stable pelvic fractures on the left. 4.  No evidence of intestinal obstruction or fecal impaction. 5.  Slight increase in size of small bilateral pleural effusions. 6.  Gastrostomy tube and urinary Carrera catheter appear to be in appropriate position  .      Recent Labs     22  0600 22  1211 22  0510   WBC see below* 16.7* 16.2*   RBC see below* 3.04* 2.88*   HGB see below* 9.2* 8.6*   HCT see below* 29.1* 27.5*   MCV see below* 95.7 95.5   MCH see below* 30.3 29.9   MCHC see below* 31.6* 31.3*   RDW see below* 17.7* 17.3*   PLT see below* 431 424   MPV see below* 10.3 10.2     Recent Labs     03/16/22  0550 03/16/22  1600 03/17/22  0054 03/18/22  0510 03/18/22  1030     --  146 144  --    K 3.0*   < > 3.8 3.4* 4.8     --  110* 108*  --    CO2 26  --  25 26  --    BUN 11  --  17 18  --    CREATININE 0.6  --  0.6 0.5  --    GLUCOSE 165*  --  97 139*  --    PROT 6.0*  --  6.2* 6.1*  --    LABALBU 3.0*  --  3.0* 2.9*  --    CALCIUM 9.5  --  9.7 9.7  --    BILITOT 0.5  --  0.5 0.4  --    ALKPHOS 74  --  97 69  --    AST 35*  --  37* 28  --    ALT 25  --  22 21  --     < > = values in this interval not displayed. Lab Results   Component Value Date    CRP 31.9 (H) 03/08/2022     Lab Results   Component Value Date    SEDRATE 109 (H) 03/08/2022     Recent Labs     03/16/22  0550 03/17/22  0054 03/17/22  0854 03/18/22  0510   PROCAL  --   --  0.18*  --    AST 35* 37*  --  28   ALT 25 22  --  21   TRIG 121  --   --   --      Microbiology:   No results for input(s): COVID19 in the last 72 hours. Lab Results   Component Value Date    BC 5 Days no growth 03/12/2022    BC 5 Days no growth 03/08/2022    BLOODCULT2 5 Days no growth 03/12/2022    BLOODCULT2 5 Days no growth 03/08/2022    ORG Staphylococcus aureus 03/08/2022    ORG Klebsiella oxytoca 07/01/2014     Galindo Circe 852        urine 2/16/2022       FINAL IMPRESSION     Admitted for Sepsis   On treatment for MRSA sepsis/uti KADEN neg   Peg infection s/p reinsertion new site NPO on TPN  Gib/fecal impaction   leukocytosis reactive better  FEVERS     Plan:   · Continue vancomycin for 4 weeks from 3/8/2022  · Pharmacy dosing   · Continue zosyn day #9/10  · Continue diflucan for now   · GI/surgery  following    · Continue wound care   · Monitor labs-  · Family is to decide proceed with Hospice care     Imaging and labs were reviewed per medical records. Thank you for involving me in the care of Jacqueline Lwa will continue to follow.  Please do not hesitate to call for any questions or concerns. Electronically signed by JOHN Summers on 3/18/2022 at 2:58 PM    As above     This is a face to face encounter with Tina Jane on 03/18/22. I have performed and participated in the history, exam, medical decision making, and  POC with the NURSE PRACTITIONER, JOHN Summers. in bed pale on o2 1l on tpn   Afebrile  Wbc16.2 cr0.5  Cont atbx for now to Hospice decision is made    Imaging and labs were reviewed. Tina Jane was informed of their diagnosis, indications, risks and benefits of treatment. Tina Jane had the opportunity to ask questions. All questions were answered. Thank you for involving me in the care of Tina Jane. Please do not hesitate to call for any questions or concerns.     Electronically signed by Sadia Thurston MD on 3/18/2022 at 5:03 PM    '

## 2022-03-18 NOTE — PROGRESS NOTES
PROGRESS NOTE  By Kate Eric M.D. [facility]  Dr. Jazmín Samuel M.D.,  Dr. Clarita Goodell, M.D.,   Dr. Lois Hernandez D.O.,  Dr. Fatou Denise M.D.,  Dr. Ava Hagen D.O.,  Marcella Caban D.O. Read Veto  67 y.o.  female    SUBJECTIVE:  No new complaints. No family at bedside    OBJECTIVE:    BP (!) 130/59   Pulse 95   Temp 97.5 °F (36.4 °C)   Resp 18   Ht 4' 11\" (1.499 m)   Wt 115 lb 6 oz (52.3 kg)   SpO2 91%   BMI 23.30 kg/m²     General: NAD/elderly  female. Appears confused and borderline to noncooperative with examination  HEENT: Anicteric sclera/moist oral mucosa  Neck: Supple with trachea. Midline  Chest: CTA B  Cor: Regular tachycardia/S1-S2  Abd.: Soft and appears nontender. PEG tube in place. Previous PEG tube site with packing  Extr.:  Decreased muscle tone and bulk throughout  Skin: Warm and dry/anicteric        DATA:    Monitor data reviewed -sinus rhythm/sinus tachycardia noted.     Stool (measured) : 0 mL  Lab Results   Component Value Date    WBC 16.2 03/18/2022    RBC 2.88 03/18/2022    HGB 8.6 03/18/2022    HCT 27.5 03/18/2022    MCV 95.5 03/18/2022    MCH 29.9 03/18/2022    MCHC 31.3 03/18/2022    RDW 17.3 03/18/2022     03/18/2022    MPV 10.2 03/18/2022     Lab Results   Component Value Date     03/18/2022    K 4.8 03/18/2022     03/18/2022    CO2 26 03/18/2022    BUN 18 03/18/2022    CREATININE 0.5 03/18/2022    CALCIUM 9.7 03/18/2022    PROT 6.1 03/18/2022    LABALBU 2.9 03/18/2022    BILITOT 0.4 03/18/2022    ALKPHOS 69 03/18/2022    AST 28 03/18/2022    ALT 21 03/18/2022     Lab Results   Component Value Date    LIPASE 29 06/23/2014     Lab Results   Component Value Date    AMYLASE 202 06/24/2014         ASSESSMENT/PLAN:  Patient Active Problem List   Diagnosis    Odontoid fracture ([facility] [address]    Hypothyroid    Multiple rib fractures    TMJ (temporomandibular joint syndrome)    Bipolar disorder (HCC)    Sciatica    Hiatal hernia    Abnormality of gait and mobility    Trigeminal neuralgia    Headache    Cervical neck pain with evidence of disc disease    Vomiting    Hypokalemia    Intra-abdominal infection    MRSA bacteremia    Paroxysmal atrial fibrillation (HCC)    Hypotension    Hypernatremia    Moderate protein-calorie malnutrition (HCC)    Sepsis (HCC)     1.  Anemia/rectal bleed  -Recurrent lower gastrointestinal bleeding with decreasing H&H  -Likely related to ulceration from fecal impaction  -Disimpaction/treatment per general surgery 3/10  -Repeat endoscopy revealing stercoral ulcer with pigmented spots which were treated  -please refer to pertinent procedure note   -Monitor H&H; defer transfusion to admitting      2.  Elevated transaminase  -Nonobstructive liver profile  -Likely related to general medical condition/medication  -Monitor labs     3.  PEG tube   -Infected PEG tube site/insertion of new PEG  -Per general surgery         Tuyet Morrow MD  3/18/2022  1:04 PM    NOTE:  This report was transcribed using voice recognition software. Every effort was made to ensure accuracy; however, inadvertent computerized transcription errors may be present.

## 2022-03-18 NOTE — PROGRESS NOTES
GENERAL SURGERY  DAILY PROGRESS NOTE  3/18/2022    No chief complaint on file. Subjective:  No acute issues overnight, no complaints of abdominal pain this morning.   No further bloody bowel movements per nursing    Objective:  /66   Pulse 93   Temp 98.4 °F (36.9 °C) (Infrared)   Resp 22   Ht 4' 11\" (1.499 m)   Wt 115 lb 6 oz (52.3 kg)   SpO2 93%   BMI 23.30 kg/m²     GENERAL:  Laying in bed, awake, alert, cooperative, no apparent distress  HEAD: Normocephalic, atraumatic  EYES: No sclera icterus, pupils equal  LUNGS:  No increased work of breathing  CARDIOVASCULAR:  RR  ABDOMEN:  Soft, non-tender, non-distended, PEG tube site clean dry and intact, previous PEG site with dressing intact mild strikethrough    Assessment/Plan:  67 y.o. female PEG site complication with gastrocutaneous fistula    Continue n.p.o. and TPN  Okay for discharge to LTAC  If does not close relatively soon will need surgical intervention for [address] closure    Electronically signed by Mesha Arboleda MD on 3/18/2022 at 6:44 AM

## 2022-03-18 NOTE — CONSULTS
PSYCHIATRY ATTENDING CONSULT    REASON FOR CONSULT:  Psych eval and medication management    REQUESTING PHYSICIAN:  Dr. Doug Doan: Unable to assess    HISTORY OF PRESENT ILLNESS:  Lorie Cordoba  is a 67 y.o. female who was admitted on 3/8/22 due to failure to thrive and dislodged PEG tube. Patient had a PEG tube inserted on 3/3/22 and discharged to nursing home. There it dislodged and was brought back to hospital where work-up in the emergency department reveled infected PEG site and intraabdominal abscess. Chart reviewed. Note home psychotropics of Zolfot 100 mg daily, Wellbutrin  mg BID and Invega 1.5 mg daily. On assessment patient is resting in bed, eyes open but does not speak and respond to the command of my voice. Patient is not picking at air or thrashing around in bed. Patient appears to be in a hypoactive delirium. Unable to assess anything further due to patients current condition. PAST PSYCHIATRIC HISTORY:  Unable to assess due to patients condition.      PAST MEDICAL HISTORY:       Diagnosis Date    Allergic     Anxiety disorder     Arthritis     Difficulty walking     Dysphagia     Hyperlipidemia     Hypertension     Interstitial cystitis     Lack of coordination     Mitral valve prolapse     pt. has a mitral valve prolapse    Neuromuscular disorder (HCC)     Other disorders of kidney and ureter in diseases classified elsewhere     Pelvis fracture (HCC)     multiple fxs pelvis    Psychiatric problem     Thyroid disease     Trigeminal neuralgia     Unspecified dementia without behavioral disturbance (HCC)        MEDICAL ROS: General - negative, neurological - negative, ENT - negative, CV - negative, pulmonary - negative, GI - negative, dermatologic - negative, rheumatologic - negative, musculoskeletal - negative,  - negative, hematologic - negative    PAST SURGICAL HISTORY:       Procedure Laterality Date    FRACTURE SURGERY      GASTROSTOMY TUBE PLACEMENT N/A 3/3/2022    EGD PEG TUBE PLACEMENT performed by Gabriela Gabriel MD at [address] 3/15/2022    SIGMOIDOSCOPY DIAGNOSTIC FLEXIBLE performed by Chico Flynn MD at [address] Left     TRANSESOPHAGEAL ECHOCARDIOGRAM N/A 3/11/2022    TRANSESOPHAGEAL ECHOCARDIOGRAM performed by Brett Bradley MD at [address] 3/10/2022    EGD ESOPHAGOGASTRODUODENOSCOPY PEG TUBE INSERTION; EVACUATION OF STOOL IMPACTON performed by Kalyn Farnsworth MD at [address]: Current Facility-Administered Medications: glycerin moisturizing mouth spray (OASIS) 35 % 2 spray, 2 spray, Mouth/Throat, PRN  PN-Adult  3 IN 1 Central Line (Custom), , IntraVENous, Continuous TPN  sodium phosphate 8.43 mmol in dextrose 5 % 250 mL IVPB, 0.16 mmol/kg, IntraVENous, PRN **OR** sodium phosphate 16.86 mmol in dextrose 5 % 250 mL IVPB, 0.32 mmol/kg, IntraVENous, PRN  acetaminophen (TYLENOL) suppository 650 mg, 650 mg, Rectal, Q4H PRN  0.9 % sodium chloride infusion, , IntraVENous, PRN  potassium chloride 20 mEq/50 mL IVPB (Central Line), 20 mEq, IntraVENous, PRN  lidocaine PF 1 % injection 5 mL, 5 mL, IntraDERmal, Once  sodium chloride flush 0.9 % injection 5-40 mL, 5-40 mL, IntraVENous, 2 times per day  sodium chloride flush 0.9 % injection 5-40 mL, 5-40 mL, IntraVENous, PRN  0.9 % sodium chloride infusion, 25 mL, IntraVENous, PRN  heparin flush 100 UNIT/ML injection 300 Units, 3 mL, IntraVENous, 2 times per day  heparin flush 100 UNIT/ML injection 300 Units, 3 mL, IntraCATHeter, PRN  0.9 % sodium chloride infusion, , IntraVENous, PRN  levothyroxine (SYNTHROID) tablet 50 mcg, 50 mcg, Oral, Daily  sertraline (ZOLOFT) tablet 100 mg, 100 mg, Oral, Daily  buPROPion (WELLBUTRIN) tablet 50 mg, 50 mg, Oral, Q6H  vancomycin (VANCOCIN) 1,250 mg in dextrose 5 % 250 mL IVPB, 1,250 mg, IntraVENous, Q24H  acetaminophen (TYLENOL) tablet 650 mg, 650 mg, Oral, Q4H PRN  pantoprazole (PROTONIX) injection 40 mg, 40 mg, IntraVENous, BID  miconazole (MICOTIN) 2 % powder, , Topical, BID  Petrolatum 42 % OINT, , Topical, BID **AND** Petrolatum 42 % OINT, , Topical, TID PRN  0.9 % sodium chloride infusion, , IntraVENous, PRN  piperacillin-tazobactam (ZOSYN) 3,375 mg in dextrose 5 % 50 mL IVPB extended infusion (mini-bag), 3,375 mg, IntraVENous, Q8H  morphine (PF) injection 2 mg, 2 mg, IntraVENous, Q4H PRN  ondansetron (ZOFRAN) injection 4 mg, 4 mg, IntraVENous, Q6H PRN  fluconazole (DIFLUCAN) in 0.9 % sodium chloride IVPB 200 mg, 200 mg, IntraVENous, Q24H  albuterol (PROVENTIL) nebulizer solution 2.5 mg, 2.5 mg, Nebulization, Q6H PRN  [Held by provider] heparin (porcine) injection 5,000 Units, 5,000 Units, SubCUTAneous, 3 times per day    ALLERGIES:  Abilify [aripiprazole], Depakote [divalproex sodium], Dye [iodides], Geodon [ziprasidone hcl], Levsin [hyoscyamine], Lithium, Neurontin [gabapentin], Risperidone and related, Tegretol [carbamazepine], Tramadol, Trileptal [oxcarbazepine], and Zyprexa [olanzapine]    FAMILY PSYCHIATRIC HISTORY: Unable to assess due to patients cognitive condition. SOCIAL HISTORY: Unable to assess due to patients cognitive condition. SUBSTANCE ABUSE HISTORY:  reports that she has never smoked. She does not have any smokeless tobacco history on file. She reports that she does not drink alcohol and does not use drugs.     VITALS:   Vitals:    03/18/22 0730   BP: (!) 130/59   Pulse: 95   Resp: 18   Temp: 97.5 °F (36.4 °C)   SpO2: 91%       LABS:CBC:  Recent Labs     03/17/22  0600 03/17/22  1211 03/18/22  0510   WBC see below* 16.7* 16.2*   RBC see below* 3.04* 2.88*   HGB see below* 9.2* 8.6*   HCT see below* 29.1* 27.5*   MCV see below* 95.7 95.5   RDW see below* 17.7* 17.3*   PLT see below* 431 424   CHEMISTRIES:  Recent Labs     03/16/22  0550 03/16/22  1600 03/16/22  2155 03/17/22  0054 03/18/22  0510 03/18/22  1030     --   -- 146 144  --    K 3.0*   < >  --  3.8 3.4* 4.8     --   --  110* 108*  --    CO2 26  --   --  25 26  --    BUN 11  --   --  17 18  --    CREATININE 0.6  --   --  0.6 0.5  --    GLUCOSE 165*  --   --  97 139*  --    PHOS 1.6*  --  2.8 2.0*  --   --    MG 2.2  --   --  2.4  --   --     < > = values in this interval not displayed. PT/INR:No results for input(s): PROTIME, INR in the last 72 hours. APTT:No results for input(s): APTT in the last 72 hours. LIVER PROFILE:  Recent Labs     03/16/22  0550 03/17/22  0054 03/18/22  0510   AST 35* 37* 28   ALT 25 22 21   BILITOT 0.5 0.5 0.4   ALKPHOS 76 97 71       MENTAL STATUS EXAMINATION  White female appears age. Unable to cooperate with assessment due to cognitive impairment. Decreased psychomotor activity, strength, tone, eye contact. Gait not assessed. Mood unable to assess. Affect blunted. Speech unable to assess. Thoughts unable to assess. No active paranoia, delusions or hallucinations. Orientation, concentration, recent and remote memory unable to assess. Fund of knowledge, language use and insight and judgment unable to assess due to patients cognitive impairment. ASSESSMENT:  Hypoactive Delirium     History of Bipolar Disorder      PLAN & RECOMMENDATIONS: Patient is awake with eyes open but not responding to questions at this time. I spoke with Dr. Shanique Brooke about this case and we suspect hypoactive delirium and not much to do from a psychiatric standpoint. Spoke with RN about the question this was caused by Invega 1.5 mg. This is a low dose and if she was taking it at home I am not sure this is the cause. It may be a coincidence. I would hold Invega at this point to be sure. Not much more to add at this point from psychiatry. Please re consult if anything new develops.     Time spent 35 min      JOHN Vann - CNP 3/18/2022 12:34 PM

## 2022-03-18 NOTE — PROGRESS NOTES
Pharmacy Consultation Note  (Antibiotic Dosing and Monitoring)    Initial consult date: 3/9/22  Consulting physician/provider: Dr. Ciara Del Castillo  Drug: Vancomycin  Indication: MRSA bacteremia    Age/  Gender Height Weight IBW  Allergy Information   76 y.o./female 4' 11\" (149.9 cm) 115 lb (52.2 kg)     Patient must be at least 60 in tall to calculate ideal body weight   Abilify [aripiprazole], Depakote [divalproex sodium], Dye [iodides], Geodon [ziprasidone hcl], Levsin [hyoscyamine], Lithium, Neurontin [gabapentin], Risperidone and related, Tegretol [carbamazepine], Tramadol, Trileptal [oxcarbazepine], and Zyprexa [olanzapine]      Renal Function:  Recent Labs     03/16/22  0550 03/17/22  0054 03/18/22  0510   BUN 11 17 18   CREATININE 0.6 0.6 0.5       Intake/Output Summary (Last 24 hours) at 3/18/2022 1153  Last data filed at 3/18/2022 0722  Gross per 24 hour   Intake 1359.34 ml   Output 1000 ml   Net 359.34 ml       Vancomycin Monitoring:  Trough:    Recent Labs     03/17/22  0340   VANCOTROUGH 13.0     Random:  No results for input(s): VANCORANDOM in the last 72 hours. Vancomycin Administration Times:  Recent vancomycin administrations                   vancomycin (VANCOCIN) 1,250 mg in dextrose 5 % 250 mL IVPB ()  Restarted 03/18/22 0431     1,250 mg New Bag  0424     1,250 mg New Bag 03/17/22 0422     1,250 mg New Bag 03/16/22 0409                  Assessment:  · Patient is a 67 y.o. female who has been initiated on vancomycin for MRSA bacteremia per chart review. The positive cultures are from an outside facility that were reported, so has not been treated. Repeat blood cultures have been collected here and show NGTD.   · CrCl ~40-50 mL/min  · To dose vancomycin, pharmacy will be utilizing [employer] calculation software for goal AUC/CLIFF 400-600 mg/L-hr   · 3/11: Level collected yesterday @ 5964 (instead of today @ 1430) = 13.2 mcg/mL, AUC/CLIFF 717  · 3/17: Trough @ 340 = 13 mcg/mL;     Plan:  · Vancomycin 1,250 IV every 24 hours  · Repeat trough as needed  · Will continue to monitor renal function   · Clinical pharmacy will continue to follow      Jayme Barnett PharmD, BCPS 3/18/2022 11:54 AM   [phone]

## 2022-03-18 NOTE — CARE COORDINATION
12 PRECERT for LTACH was only good for 24 hours- - has to be resent today if discharge today. [facility] discharge plan. Pt on TPN, IV atb. Wound care for old PEG site. Surgery agrees with [facility] discharge.  Ambulance sheet in soft blue chart. Cm following.  Electronically signed by Ari Genao RN-BC on 3/18/2022 at 11:02 AM

## 2022-03-18 NOTE — PROGRESS NOTES
Internal Medicine Progress Note    [facility]    Yancy Soria. Ravi Salazar, STEVOOEARNEST Garsia D.O., LUCHO Foster, MSN, APRN, NP-C  Toby Rivera. Patrica Pickett, MSN, APRN-CNP     Primary Care Physician: Jocelin Leal MD   Admitting Physician:  Nimo Jasso DO  Admission date and time: 3/8/2022  6:25 PM    Room:  [address] [place] [address]  Admitting diagnosis: Sepsis, unspecified organism [A41.9]  Sepsis [facility]) [A41.9]    Patient Name: Chuck Lee  MRN: [id number]    Date of Service: 3/18/2022     Subjective:  Devora Ramos is a 67 y.o. female who was seen and examined today,3/18/2022, at the bedside. Patient was resting in bed. Staff present at the bedside. States patient is still not at her baseline. Patient will follow commands and did wave when asked to as well as other commands. Did say hello but otherwise did not engage in conversation. No family present. Review of System:   Unable to be obtained in the patient's current condition- Did say hello but otherwise did not engage in conversation. Physical Exam:  I/O this shift: In: 976.1 [IV Piggyback:511.3]  Out: -     Intake/Output Summary (Last 24 hours) at 3/18/2022 1517  Last data filed at 3/18/2022 1433  Gross per 24 hour   Intake 1930.52 ml   Output 650 ml   Net 1280.52 ml   I/O last 3 completed shifts: In: 2251.2 [I.V.:3; IV Piggyback:728]  Out: 2050 [Urine:2050]  Patient Vitals for the past 96 hrs (Last 3 readings):   Weight   03/18/22 0124 115 lb 6 oz (52.3 kg)   03/17/22 0600 116 lb 14.4 oz (53 kg)   03/16/22 0006 116 lb 3.2 oz (52.7 kg)     Vital Signs:   Blood pressure (!) 130/59, pulse 95, temperature 97.5 °F (36.4 °C), resp. rate 18, height 4' 11\" (1.499 m), weight 115 lb 6 oz (52.3 kg), SpO2 91 %. General appearance:  Awake and alert. Eyes are open. In no acute distress. Will follow commands.   Says hello but otherwise does not attempt to answer questions or speak  Head:  Normocephalic. No masses, lesions or tenderness. Eyes:  PERRLA. EOMI. Sclera clear. ENT:  Ears normal. Mucosa is dry. Neck:    Supple. Trachea midline. No thyromegaly. No JVD. No bruits. Heart:    Rhythm regular. Rate controlled. No murmurs. Lungs:    Diminished with bibasilar rales. Abdomen:   PEG tube is in place. Dressings intact. Ostomy is in place covering previous PEG tube site bowel sounds are hypoactive. Extremities:    Peripheral pulses present. No peripheral edema. No ulcers. No cyanosis. No clubbing. Neurologic:    Completely obtunded. He is unable to answer questions. She is unable to follow commands. Her eyes are open. Psych:   Behavior is normal. Mood appears normal.  Unable to assess speech. Musculoskeletal:   Spine ROM normal. Muscular strength weak. Gait not assessed. Integumentary:  Excoriation under the breasts- yeast.   Genitalia/Breast:  Voiding with use of a Carrera catheter.     Medication:  Scheduled Meds:   lidocaine PF  5 mL IntraDERmal Once    sodium chloride flush  5-40 mL IntraVENous 2 times per day    heparin flush  3 mL IntraVENous 2 times per day    levothyroxine  50 mcg Oral Daily    sertraline  100 mg Oral Daily    buPROPion  50 mg Oral Q6H    vancomycin  1,250 mg IntraVENous Q24H    pantoprazole  40 mg IntraVENous BID    miconazole   Topical BID    Petrolatum   Topical BID    piperacillin-tazobactam  3,375 mg IntraVENous Q8H    fluconazole  200 mg IntraVENous Q24H    [Held by provider] heparin (porcine)  5,000 Units SubCUTAneous 3 times per day     Continuous Infusions:   PN-Adult  3 IN 1 Central Line (Custom)      PN-Adult  3 IN 1 Central Line (Custom) 50 mL/hr at 03/18/22 1433    sodium chloride      sodium chloride Stopped (03/18/22 0430)    sodium chloride      sodium chloride         Objective Data:  CBC with Differential:    Lab Results   Component Value Date    WBC 16.2 03/18/2022    RBC 2.88 03/18/2022    HGB 8.6 03/18/2022    HCT 27.5 03/18/2022     03/18/2022    MCV 95.5 03/18/2022    MCH 29.9 03/18/2022    MCHC 31.3 03/18/2022    RDW 17.3 03/18/2022    NRBC 0.9 03/09/2022    METASPCT 2.0 03/12/2022    LYMPHOPCT 13.2 03/18/2022    MONOPCT 7.6 03/18/2022    MYELOPCT 2.0 03/12/2022    BASOPCT 0.2 03/18/2022    MONOSABS 1.23 03/18/2022    LYMPHSABS 2.13 03/18/2022    EOSABS 1.12 03/18/2022    BASOSABS 0.04 03/18/2022     CMP:    Lab Results   Component Value Date     03/18/2022    K 4.8 03/18/2022     03/18/2022    CO2 26 03/18/2022    BUN 18 03/18/2022    CREATININE 0.5 03/18/2022    GFRAA >60 03/18/2022    LABGLOM >60 03/18/2022    GLUCOSE 139 03/18/2022    PROT 6.1 03/18/2022    LABALBU 2.9 03/18/2022    CALCIUM 9.7 03/18/2022    BILITOT 0.4 03/18/2022    ALKPHOS 69 03/18/2022    AST 28 03/18/2022    ALT 21 03/18/2022       Wound Documentation:   Wound 03/10/22 Abdomen Medial;Upper (Active)   Dressing Status Other (Comment) 03/10/22 1319   Drainage Amount Scant 03/10/22 1319   Number of days: 0       Assessment:  1. Sepsis in the setting of dislodged PEG tube with developing abscess and intra-abdominal infection status post new PEG tube insertion  2. MRSA bacteremia  3. Fecal impaction with ongoing improvement  4. Anemia of chronic disease  5. Paroxysmal atrial fibrillation  6. Pubic rami fractures  7. Intertrigo under the breasts bilaterally   8. Failure to thrive with severe protein calorie malnutrition  9. Neuromuscular disorder of undetermined etiology with known psychiatric dysfunction as per the chart  10. Hypothyroidism  11. Essential hypertension  12. Hyperlipidemia      Plan:   Due to substantial change in the patient's mentation a CT of the head was obtained for further evaluation. Revealed no acute intracranial abnormalities. Periventricular white matter changes consistent with chronic microvascular disease. Cultures were also obtained secondary to mentation changes.   Urine culture revealed no growth. Blood cultures are pending. Patient was mildly febrile with temperature of 99.5 yesterday at noon but has been afebrile today. Lactic acid level was found to be within normal limits 1.4. ID is following and antibiotic therapy is at their discretion. Patient to be continued on TPN for nutritional support at this time. Informed the patient did receive Invega injection yesterday at the request of her family and it was after that time that the patient's mentation altered. This medication was placed on hold. Due to overall decline will obtain hospice consult to discuss code status and goals of care. More than 50% of my  time was spent at the bedside counseling/coordinating care with the patient and/or family with face to face contact. This time was spent reviewing notes and laboratory data as well as instructing and counseling the patient. Time I spent with the family or surrogate(s) is included only if the patient was incapable of providing the necessary information or participating in medical decisions. I also discussed the differential diagnosis and all of the proposed management plans with the patient and individuals accompanying the patient. Meena Simmons requires this high level of physician care and nursing on the [facility] unit due the complexity of decision management and chance of rapid decline or death. Continued cardiac monitoring and higher level of nursing are required. I am readily available for any further decision-making and intervention. The patient was seen, examined and then discussed with Dr. Shant Ortiz. JOHN Martínez - CNP  3:17 PM  3/18/2022       I saw and evaluated the patient. I agree with the findings and the plan of care as documented in Tiffany Escamilla NP-C's  note.     Bijan Paulino DO, D.O., Lizbeth Smith  3:55 PM  3/18/2022

## 2022-03-18 NOTE — PROGRESS NOTES
Consult received, chart reviewed. Call placed to patients sister Ziggy Serve. No answer, vm left for her to return call for [facility].  [id number] Sister Ziggy Serve at bedside. Information provided to her on hospice services. She verbalizes that she thinks the son would want the patient to return to [facility] with hospice, but that we would need to ask him after surgery. [facility] will follow up with son after his surgery to discuss how he would like to proceed.

## 2022-03-18 NOTE — CARE COORDINATION
3/18/2022 HOV choiced- per pts sister Siddhartha Morel. pts son is having surgery today- was canceled yesterday. Consult phoned to [address] [place] [address] at [facility].   Electronically signed by Masoud Montelongo RN-BCBC on 3/18/2022 at 12:25 PM

## 2022-03-18 NOTE — PROGRESS NOTES
Per conversation I had with son and sister who are dual POAs; family wishes for [facility] code status at this time. Family has full understanding no chest compressions or intubation no further life saving measures. Hospice present for consult and aware of update. NP notified and order placed.

## 2022-03-18 NOTE — PROGRESS NOTES
Spoke with sister and son who are in agreement at this time that they would like to consult with hospice.  NP notified of patients wishes and family request. Consult placed [facility]

## 2022-03-19 LAB
ALBUMIN SERPL-MCNC: 3 G/DL (ref 3.5–5.2)
ALP BLD-CCNC: 75 U/L (ref 35–104)
ALT SERPL-CCNC: 18 U/L (ref 0–32)
ANION GAP SERPL CALCULATED.3IONS-SCNC: 8 MMOL/L (ref 7–16)
AST SERPL-CCNC: 21 U/L (ref 0–31)
BASOPHILS ABSOLUTE: 0.07 E9/L (ref 0–0.2)
BASOPHILS RELATIVE PERCENT: 0.4 % (ref 0–2)
BILIRUB SERPL-MCNC: 0.4 MG/DL (ref 0–1.2)
BUN BLDV-MCNC: 21 MG/DL (ref 6–23)
CALCIUM SERPL-MCNC: 10.1 MG/DL (ref 8.6–10.2)
CHLORIDE BLD-SCNC: 110 MMOL/L (ref 98–107)
CO2: 26 MMOL/L (ref 22–29)
CREAT SERPL-MCNC: 0.5 MG/DL (ref 0.5–1)
EOSINOPHILS ABSOLUTE: 1.11 E9/L (ref 0.05–0.5)
EOSINOPHILS RELATIVE PERCENT: 6.9 % (ref 0–6)
GFR AFRICAN AMERICAN: >60
GFR NON-AFRICAN AMERICAN: >60 ML/MIN/1.73
GLUCOSE BLD-MCNC: 98 MG/DL (ref 74–99)
HCT VFR BLD CALC: 29 % (ref 34–48)
HEMOGLOBIN: 9 G/DL (ref 11.5–15.5)
IMMATURE GRANULOCYTES #: 0.2 E9/L
IMMATURE GRANULOCYTES %: 1.2 % (ref 0–5)
LYMPHOCYTES ABSOLUTE: 1.73 E9/L (ref 1.5–4)
LYMPHOCYTES RELATIVE PERCENT: 10.8 % (ref 20–42)
MCH RBC QN AUTO: 29.8 PG (ref 26–35)
MCHC RBC AUTO-ENTMCNC: 31 % (ref 32–34.5)
MCV RBC AUTO: 96 FL (ref 80–99.9)
METER GLUCOSE: 112 MG/DL (ref 74–99)
METER GLUCOSE: 118 MG/DL (ref 74–99)
METER GLUCOSE: 130 MG/DL (ref 74–99)
METER GLUCOSE: 133 MG/DL (ref 74–99)
METER GLUCOSE: 83 MG/DL (ref 74–99)
MONOCYTES ABSOLUTE: 1.26 E9/L (ref 0.1–0.95)
MONOCYTES RELATIVE PERCENT: 7.8 % (ref 2–12)
NEUTROPHILS ABSOLUTE: 11.7 E9/L (ref 1.8–7.3)
NEUTROPHILS RELATIVE PERCENT: 72.9 % (ref 43–80)
PDW BLD-RTO: 17.4 FL (ref 11.5–15)
PLATELET # BLD: 482 E9/L (ref 130–450)
PMV BLD AUTO: 10.3 FL (ref 7–12)
POTASSIUM SERPL-SCNC: 4.5 MMOL/L (ref 3.5–5)
RBC # BLD: 3.02 E12/L (ref 3.5–5.5)
SODIUM BLD-SCNC: 144 MMOL/L (ref 132–146)
TOTAL PROTEIN: 6.5 G/DL (ref 6.4–8.3)
URINE CULTURE, ROUTINE: NORMAL
WBC # BLD: 16.1 E9/L (ref 4.5–11.5)

## 2022-03-19 PROCEDURE — 80053 COMPREHEN METABOLIC PANEL: CPT

## 2022-03-19 PROCEDURE — 6360000002 HC RX W HCPCS: Performed by: INTERNAL MEDICINE

## 2022-03-19 PROCEDURE — 85025 COMPLETE CBC W/AUTO DIFF WBC: CPT

## 2022-03-19 PROCEDURE — 2500000003 HC RX 250 WO HCPCS: Performed by: INTERNAL MEDICINE

## 2022-03-19 PROCEDURE — C9113 INJ PANTOPRAZOLE SODIUM, VIA: HCPCS | Performed by: SURGERY

## 2022-03-19 PROCEDURE — 2580000003 HC RX 258: Performed by: INTERNAL MEDICINE

## 2022-03-19 PROCEDURE — 6370000000 HC RX 637 (ALT 250 FOR IP): Performed by: SURGERY

## 2022-03-19 PROCEDURE — 6360000002 HC RX W HCPCS: Performed by: SURGERY

## 2022-03-19 PROCEDURE — 82962 GLUCOSE BLOOD TEST: CPT

## 2022-03-19 PROCEDURE — 36415 COLL VENOUS BLD VENIPUNCTURE: CPT

## 2022-03-19 PROCEDURE — 2060000000 HC ICU INTERMEDIATE R&B

## 2022-03-19 PROCEDURE — 2580000003 HC RX 258: Performed by: SURGERY

## 2022-03-19 RX ADMIN — PIPERACILLIN SODIUM AND TAZOBACTAM SODIUM 3375 MG: 3; 375 INJECTION, POWDER, FOR SOLUTION INTRAVENOUS at 06:41

## 2022-03-19 RX ADMIN — PANTOPRAZOLE SODIUM 40 MG: 40 INJECTION, POWDER, FOR SOLUTION INTRAVENOUS at 08:52

## 2022-03-19 RX ADMIN — PETROLATUM: 420 OINTMENT TOPICAL at 21:47

## 2022-03-19 RX ADMIN — PETROLATUM: 420 OINTMENT TOPICAL at 09:00

## 2022-03-19 RX ADMIN — HEPARIN 300 UNITS: 100 SYRINGE at 21:45

## 2022-03-19 RX ADMIN — VANCOMYCIN HYDROCHLORIDE 1250 MG: 10 INJECTION, POWDER, LYOPHILIZED, FOR SOLUTION INTRAVENOUS at 05:25

## 2022-03-19 RX ADMIN — HEPARIN 300 UNITS: 100 SYRINGE at 08:52

## 2022-03-19 RX ADMIN — PIPERACILLIN SODIUM AND TAZOBACTAM SODIUM 3375 MG: 3; 375 INJECTION, POWDER, FOR SOLUTION INTRAVENOUS at 23:27

## 2022-03-19 RX ADMIN — PANTOPRAZOLE SODIUM 40 MG: 40 INJECTION, POWDER, FOR SOLUTION INTRAVENOUS at 21:44

## 2022-03-19 RX ADMIN — PIPERACILLIN SODIUM AND TAZOBACTAM SODIUM 3375 MG: 3; 375 INJECTION, POWDER, FOR SOLUTION INTRAVENOUS at 15:19

## 2022-03-19 RX ADMIN — ANTI-FUNGAL POWDER MICONAZOLE NITRATE TALC FREE: 1.42 POWDER TOPICAL at 21:48

## 2022-03-19 RX ADMIN — ANTI-FUNGAL POWDER MICONAZOLE NITRATE TALC FREE: 1.42 POWDER TOPICAL at 08:55

## 2022-03-19 RX ADMIN — PIPERACILLIN SODIUM AND TAZOBACTAM SODIUM 3375 MG: 3; 375 INJECTION, POWDER, FOR SOLUTION INTRAVENOUS at 00:08

## 2022-03-19 RX ADMIN — FLUCONAZOLE IN SODIUM CHLORIDE 200 MG: 2 INJECTION, SOLUTION INTRAVENOUS at 08:52

## 2022-03-19 RX ADMIN — Medication 10 ML: at 08:55

## 2022-03-19 RX ADMIN — Medication 10 ML: at 21:48

## 2022-03-19 RX ADMIN — SODIUM ACETATE: 164 INJECTION, SOLUTION, CONCENTRATE INTRAVENOUS at 18:20

## 2022-03-19 ASSESSMENT — PAIN SCALES - PAIN ASSESSMENT IN ADVANCED DEMENTIA (PAINAD)
BODYLANGUAGE: 0
BODYLANGUAGE: 0
BREATHING: 0
BREATHING: 0
FACIALEXPRESSION: 0
NEGVOCALIZATION: 0
TOTALSCORE: 0
CONSOLABILITY: 0
FACIALEXPRESSION: 0
TOTALSCORE: 0
CONSOLABILITY: 0
NEGVOCALIZATION: 0

## 2022-03-19 ASSESSMENT — PAIN SCALES - GENERAL
PAINLEVEL_OUTOF10: 0

## 2022-03-19 NOTE — PROGRESS NOTES
Internal Medicine Progress Note    [facility]    Aniya Cameron. Kelly Lopez., STEVOOEARNEST Armando D.O., LUCHO Feldman, MSN, APRN, NP-C  Windy Steinberg. Gagan Blum, MSN, APRN-CNP     Primary Care Physician: Leda Longoria MD   Admitting Physician:  Rafaela Najjar, DO  Admission date and time: 3/8/2022  6:25 PM    Room:  [address] [place] [address]  Admitting diagnosis: Sepsis, unspecified organism [A41.9]  Sepsis [facility]) [A41.9]    Patient Name: Suleiman Pennington  MRN: [id number]    Date of Service: 3/19/2022     Subjective:  Sudarshan Bella is a 67 y.o. female who was seen and examined today,3/19/2022, at the bedside. Patient more animated today. She does engage in conversation. Admits to very dry mouth. Admits to fatigue. Patient admits to mild abdominal discomfort. Fatigued and weak. No family present. Review of System:   ROS: Review of rest of 12 systems negative except as mentioned above-subjective section      Physical Exam:  I/O this shift: In: 0   Out: 650 [Urine:650]    Intake/Output Summary (Last 24 hours) at 3/19/2022 1448  Last data filed at 3/19/2022 1422  Gross per 24 hour   Intake 978.01 ml   Output 1750 ml   Net -771.99 ml   I/O last 3 completed shifts: In: 2515.2 [I.V.:3; IV Piggyback:704]  Out: 2100 [Urine:2100]  Patient Vitals for the past 96 hrs (Last 3 readings):   Weight   03/19/22 0213 115 lb (52.2 kg)   03/18/22 0124 115 lb 6 oz (52.3 kg)   03/17/22 0600 116 lb 14.4 oz (53 kg)     Vital Signs:   Blood pressure 132/69, pulse 100, temperature 98.8 °F (37.1 °C), temperature source Oral, resp. rate 16, height 4' 11\" (1.499 m), weight 115 lb (52.2 kg), SpO2 93 %. General appearance:  Awake and alert. Animated and converses today. .  In no acute distress. Head:  Normocephalic. No masses, lesions or tenderness. Eyes:  PERRLA. EOMI. Sclera clear. ENT:  Ears normal. Mucosa is dry-crusting noted on roof of mouth and lips.   Mouth care given easily removed. Chapstick applied to lips. Neck:    Supple. Trachea midline. No thyromegaly. No JVD. No bruits. Heart:    Rhythm regular. Rate controlled. No murmurs. Lungs:    Diminished with bibasilar rales. Abdomen:   PEG tube is in place. Dressings intact. Ostomy is in place covering previous PEG tube site bowel sounds are hypoactive. Extremities:    Peripheral pulses present. No peripheral edema. No ulcers. No cyanosis. No clubbing. Neurologic:    Awake and alert. Converses today. Psych:   Behavior is normal. Mood appears normal.    Musculoskeletal:   Spine ROM normal. Muscular strength weak. Gait not assessed. Integumentary:  Excoriation under the breasts- yeast.   Genitalia/Breast:  Voiding with use of a Carrera catheter.     Medication:  Scheduled Meds:   lidocaine PF  5 mL IntraDERmal Once    sodium chloride flush  5-40 mL IntraVENous 2 times per day    heparin flush  3 mL IntraVENous 2 times per day    levothyroxine  50 mcg Oral Daily    sertraline  100 mg Oral Daily    buPROPion  50 mg Oral Q6H    vancomycin  1,250 mg IntraVENous Q24H    pantoprazole  40 mg IntraVENous BID    miconazole   Topical BID    Petrolatum   Topical BID    piperacillin-tazobactam  3,375 mg IntraVENous Q8H    fluconazole  200 mg IntraVENous Q24H    [Held by provider] heparin (porcine)  5,000 Units SubCUTAneous 3 times per day     Continuous Infusions:   PN-Adult  3 IN 1 Central Line (Custom)      PN-Adult  3 IN 1 Central Line (Custom) 66.7 mL/hr at 03/18/22 1808    sodium chloride      sodium chloride Stopped (03/18/22 0430)    sodium chloride      sodium chloride         Objective Data:  CBC with Differential:    Lab Results   Component Value Date    WBC 16.1 03/19/2022    RBC 3.02 03/19/2022    HGB 9.0 03/19/2022    HCT 29.0 03/19/2022     03/19/2022    MCV 96.0 03/19/2022    MCH 29.8 03/19/2022    MCHC 31.0 03/19/2022    RDW 17.4 03/19/2022    NRBC 0.9 03/09/2022    METASPCT 2.0 03/12/2022    LYMPHOPCT 10.8 03/19/2022    MONOPCT 7.8 03/19/2022    MYELOPCT 2.0 03/12/2022    BASOPCT 0.4 03/19/2022    MONOSABS 1.26 03/19/2022    LYMPHSABS 1.73 03/19/2022    EOSABS 1.11 03/19/2022    BASOSABS 0.07 03/19/2022     CMP:    Lab Results   Component Value Date     03/19/2022    K 4.5 03/19/2022     03/19/2022    CO2 26 03/19/2022    BUN 21 03/19/2022    CREATININE 0.5 03/19/2022    GFRAA >60 03/19/2022    LABGLOM >60 03/19/2022    GLUCOSE 98 03/19/2022    PROT 6.5 03/19/2022    LABALBU 3.0 03/19/2022    CALCIUM 10.1 03/19/2022    BILITOT 0.4 03/19/2022    ALKPHOS 75 03/19/2022    AST 21 03/19/2022    ALT 18 03/19/2022       Wound Documentation:   Wound 03/10/22 Abdomen Medial;Upper (Active)   Dressing Status Other (Comment) 03/10/22 1319   Drainage Amount Scant 03/10/22 1319   Number of days: 0       Assessment:  1. Sepsis in the setting of dislodged PEG tube with developing abscess and intra-abdominal infection status post new PEG tube insertion  2. MRSA bacteremia  3. Fecal impaction with ongoing improvement  4. Anemia of chronic disease  5. AMS likely secondary to Invega injection  6. Paroxysmal atrial fibrillation  7. Pubic rami fractures  8. Intertrigo under the breasts bilaterally   9. Failure to thrive with severe protein calorie malnutrition  10. Neuromuscular disorder of undetermined etiology with known psychiatric dysfunction as per the chart  11. Hypothyroidism  12. Essential hypertension  13. Hyperlipidemia      Plan:   Patient's mentation is much improved today. She does converse and is more animated. Hospice services were consulted yesterday at the request of the family. Awaiting consult/accommodations. Hyperalimentation has been renewed. Culture results have been reviewed. Urine and blood cultures to date are negative. ID is following and antibiotic therapy is at their discretion. I  PT/OT to evaluate and treat. WBCs are trending downward.   Monitor hemoglobin and transfuse as warranted. More than 50% of my  time was spent at the bedside counseling/coordinating care with the patient and/or family with face to face contact. This time was spent reviewing notes and laboratory data as well as instructing and counseling the patient. Time I spent with the family or surrogate(s) is included only if the patient was incapable of providing the necessary information or participating in medical decisions. I also discussed the differential diagnosis and all of the proposed management plans with the patient and individuals accompanying the patient. Smith Carrillo requires this high level of physician care and nursing on the [facility] unit due the complexity of decision management and chance of rapid decline or death. Continued cardiac monitoring and higher level of nursing are required. I am readily available for any further decision-making and intervention. The patient was seen, examined and then discussed with Dr. Joselo Craft. JOHN Escobar - CNP  2:48 PM  3/19/2022       I saw and evaluated the patient. I agree with the findings and the plan of care as documented in Rusty Hoover NP-C's  note.     Dominique Cleary DO, D.O., [place]  3:06 PM  3/19/2022

## 2022-03-19 NOTE — PROGRESS NOTES
[address] [facility] [employer]  [facility]  Progress Note    NAME: Alona Branham  MR:  [id number]  :   1945  DATE OF SERVICE:22    This is a face to face encounter with Alona Branham 67 y.o. female on 22    CHIEF COMPLAINT     ID following atbx/mrsa bacreremia   HISTORY OF PRESENT ILLNESS   Pt seen and examined  22   In bed awake and more alert   nad  No pain    3/18/2022  Patient has been afebrile. In bed- on room air. Arousable. On TPN.     3/17/2022  In bed frail   On o2 on tpn no leakage from peg      In bed on o2 on TPN  picc peg no d/c    3/15/2022  In bed pale falls asleep easily    3/14/2022  Awake- in bed  PEG tube is not working  Patient had bloody stools this am.   She has been afebrile. Tachy     Son at bedside and updated. 3/13/2022  In bed working with pt  Son present and updated  Tmax 99.7 2L weakness    3/12/2022  PER STAFF PT HAD A FEVER [id number] 4L  HER TF IS GOING THROUGH HER STOMA BAG   SHE HAS NO C/O F/C/N/V/D    3/11/2022  More awake and alert has no c/o   Afebrile 3L    3/10/2022  Seen postop EGD ESOPHAGOGASTRODUODENOSCOPY PEG TUBE INSERTION, exam under anesthesia, manual fecal disimpaction, control perianal hemorrhage, rectal packing     nad  Has pain     Patient is tolerating medications. No reported adverse drug reactions. REVIEW OF SYSTEMS     As stated above in the chief complaint, otherwise negative.   CURRENT MEDICATIONS      lidocaine PF  5 mL IntraDERmal Once    sodium chloride flush  5-40 mL IntraVENous 2 times per day    heparin flush  3 mL IntraVENous 2 times per day    levothyroxine  50 mcg Oral Daily    sertraline  100 mg Oral Daily    buPROPion  50 mg Oral Q6H    vancomycin  1,250 mg IntraVENous Q24H    pantoprazole  40 mg IntraVENous BID    miconazole   Topical BID    Petrolatum   Topical BID    piperacillin-tazobactam  3,375 mg IntraVENous Q8H    fluconazole  200 mg IntraVENous Q24H    [Held by provider] heparin (porcine)  5,000 Units SubCUTAneous 3 times per day     Continuous Infusions:   PN-Adult  3 IN 1 Central Line (Custom) 66.7 mL/hr at 22 1820    sodium chloride      sodium chloride Stopped (22 0430)    sodium chloride      sodium chloride       PRN Meds:glycerin moisturizing mouth spray, sodium phosphate IVPB **OR** sodium phosphate IVPB, acetaminophen, sodium chloride, potassium chloride, sodium chloride flush, sodium chloride, heparin flush, sodium chloride, acetaminophen, Petrolatum **AND** Petrolatum, sodium chloride, morphine, ondansetron, albuterol    PHYSICAL EXAM     /68   Pulse 105   Temp 98.6 °F (37 °C) (Oral)   Resp 16   Ht 4' 11\" (1.499 m)   Wt 115 lb (52.2 kg)   SpO2 93%   BMI 23.23 kg/m²   Temp  Av.4 °F (36.9 °C)  Min: 97.5 °F (36.4 °C)  Max: 99 °F (37.2 °C)  Constitutional:  The patient is awake   Skin:    Warm and dry. No rash pale   HEENT:     AT/NC  Chest:    Symmetrical expansion. Dec bs 2l  Cardiovascular:  S1 and S2 are rhythmic and regular. Abdomen:   Positive bowel sounds to auscultation. peg binder stoma   Extremities:      Edema. Dec rom   CNS     no acute distress   Lines: left upper arm with line- no phlebitis. Carrera yellow CLEAR    DIAGNOSTIC RESULTS   Radiology:  3/14 ct a/p   1.  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 2.  No intra-abdominal or abdominal wall abscess. 3.  Stable pelvic fractures on the left. 4.  No evidence of intestinal obstruction or fecal impaction. 5.  Slight increase in size of small bilateral pleural effusions. 6.  Gastrostomy tube and urinary Carrera catheter appear to be in appropriate position  .      Recent Labs     22  1211 22  0510 22  0520   WBC 16.7* 16.2* 16.1*   RBC 3.04* 2.88* 3.02*   HGB 9.2* 8.6* 9.0*   HCT 29.1* 27.5* 29.0*   MCV 95.7 95.5 96.0   MCH 30.3 29.9 29.8   MCHC 31.6* 31.3* 31.0*   RDW 17.7* 17.3* 17.4*    424 482*   MPV 10.3 10.2 10.3     Recent Labs     03/17/22  0054 03/17/22  0054 03/18/22  0510 03/18/22  1030 03/19/22  0520     --  144  --  144   K 3.8   < > 3.4* 4.8 4.5   *  --  108*  --  110*   CO2 25  --  26  --  26   BUN 17  --  18  --  21   CREATININE 0.6  --  0.5  --  0.5   GLUCOSE 97  --  139*  --  98   PROT 6.2*  --  6.1*  --  6.5   LABALBU 3.0*  --  2.9*  --  3.0*   CALCIUM 9.7  --  9.7  --  10.1   BILITOT 0.5  --  0.4  --  0.4   ALKPHOS 97  --  69  --  75   AST 37*  --  28  --  21   ALT 22  --  21  --  18    < > = values in this interval not displayed. Lab Results   Component Value Date    CRP 31.9 (H) 03/08/2022     Lab Results   Component Value Date    SEDRATE 109 (H) 03/08/2022     Recent Labs     03/17/22  0054 03/17/22  0854 03/18/22  0510 03/19/22  0520   PROCAL  --  0.18*  --   --    AST 37*  --  28 21   ALT 22  --  21 18     Microbiology:   No results for input(s): COVID19 in the last 72 hours. Lab Results   Component Value Date    BC 5 Days no growth 03/12/2022    BC 5 Days no growth 03/08/2022    BLOODCULT2 24 Hours no growth 03/17/2022    BLOODCULT2 5 Days no growth 03/12/2022    BLOODCULT2 5 Days no growth 03/08/2022    ORG Staphylococcus aureus 03/08/2022    ORG Klebsiella oxytoca 07/01/2014     Travessa Circe 852        urine 2/16/2022       FINAL IMPRESSION     Admitted for Sepsis   On treatment for MRSA sepsis/uti KADEN neg   Peg infection s/p reinsertion new site NPO on TPN  Gib/fecal impaction   leukocytosis reactive better  FEVERS     Plan:   · Continue vancomycin for 4 weeks from 3/8/2022  · Pharmacy dosing   · Continue zosyn day #10/10  · Continue diflucan for now   · GI/surgery  following    · Continue wound care   · Monitor labs-  · Family is to decide proceed with Hospice care        Imaging and labs were reviewed. Roma Melton was informed of their diagnosis, indications, risks and benefits of treatment. Roma Melton had the opportunity to ask questions.   All questions were answered. Thank you for involving me in the care of Arvin Bishop. Please do not hesitate to call for any questions or concerns.     Electronically signed by Marcio Robledo MD on 3/19/2022 at 6:37 PM    '

## 2022-03-19 NOTE — PROGRESS NOTES
Pharmacy Consultation Note  (Antibiotic Dosing and Monitoring)    Initial consult date: 3/9/22  Consulting physician/provider: Dr. Fabienne Demarco  Drug: Vancomycin  Indication: MRSA bacteremia    Age/  Gender Height Weight IBW  Allergy Information   76 y.o./female 4' 11\" (149.9 cm) 115 lb (52.2 kg)     Patient must be at least 60 in tall to calculate ideal body weight   Abilify [aripiprazole], Depakote [divalproex sodium], Dye [iodides], Geodon [ziprasidone hcl], Levsin [hyoscyamine], Lithium, Neurontin [gabapentin], Risperidone and related, Tegretol [carbamazepine], Tramadol, Trileptal [oxcarbazepine], and Zyprexa [olanzapine]      Renal Function:  Recent Labs     03/17/22  0054 03/18/22  0510 03/19/22  0520   BUN 17 18 21   CREATININE 0.6 0.5 0.5       Intake/Output Summary (Last 24 hours) at 3/19/2022 1503  Last data filed at 3/19/2022 1422  Gross per 24 hour   Intake 978.01 ml   Output 1750 ml   Net -771.99 ml       Vancomycin Monitoring:  Trough:    Recent Labs     03/17/22  0340   VANCOTROUGH 13.0     Random:  No results for input(s): VANCORANDOM in the last 72 hours. Vancomycin Administration Times:  Recent vancomycin administrations                   vancomycin (VANCOCIN) 1,250 mg in dextrose 5 % 250 mL IVPB ()  Restarted 03/18/22 0431     1,250 mg New Bag  0424     1,250 mg New Bag 03/17/22 0422     1,250 mg New Bag 03/16/22 0409                  Assessment:  · Patient is a 67 y.o. female who has been initiated on vancomycin for MRSA bacteremia per chart review. The positive cultures are from an outside facility that were reported, so has not been treated. Repeat blood cultures have been collected here and show NGTD.   · CrCl ~40-50 mL/min  · To dose vancomycin, pharmacy will be utilizing [employer] calculation software for goal AUC/CLIFF 400-600 mg/L-hr   · 3/11: Level collected yesterday @ 7163 (instead of today @ 1430) = 13.2 mcg/mL, AUC/CLIFF 717  · 3/17: Trough @ 340 = 13 mcg/mL;     Plan:  · Vancomycin 1,250 IV every 24 hours  · Repeat trough as needed  · Will continue to monitor renal function   · Clinical pharmacy will continue to follow      Pedro Luis Bella PharmD 3/19/2022 3:03 PM   [phone]

## 2022-03-20 LAB
ALBUMIN SERPL-MCNC: 3 G/DL (ref 3.5–5.2)
ALP BLD-CCNC: 90 U/L (ref 35–104)
ALT SERPL-CCNC: 29 U/L (ref 0–32)
ANION GAP SERPL CALCULATED.3IONS-SCNC: 10 MMOL/L (ref 7–16)
AST SERPL-CCNC: 41 U/L (ref 0–31)
BASOPHILS ABSOLUTE: 0.06 E9/L (ref 0–0.2)
BASOPHILS RELATIVE PERCENT: 0.4 % (ref 0–2)
BILIRUB SERPL-MCNC: 0.4 MG/DL (ref 0–1.2)
BUN BLDV-MCNC: 22 MG/DL (ref 6–23)
CALCIUM SERPL-MCNC: 10 MG/DL (ref 8.6–10.2)
CHLORIDE BLD-SCNC: 107 MMOL/L (ref 98–107)
CO2: 25 MMOL/L (ref 22–29)
CREAT SERPL-MCNC: 0.6 MG/DL (ref 0.5–1)
EOSINOPHILS ABSOLUTE: 1.22 E9/L (ref 0.05–0.5)
EOSINOPHILS RELATIVE PERCENT: 8.3 % (ref 0–6)
GFR AFRICAN AMERICAN: >60
GFR NON-AFRICAN AMERICAN: >60 ML/MIN/1.73
GLUCOSE BLD-MCNC: 122 MG/DL (ref 74–99)
HCT VFR BLD CALC: 29.5 % (ref 34–48)
HEMOGLOBIN: 9.1 G/DL (ref 11.5–15.5)
IMMATURE GRANULOCYTES #: 0.16 E9/L
IMMATURE GRANULOCYTES %: 1.1 % (ref 0–5)
LYMPHOCYTES ABSOLUTE: 1.62 E9/L (ref 1.5–4)
LYMPHOCYTES RELATIVE PERCENT: 11 % (ref 20–42)
MCH RBC QN AUTO: 29.6 PG (ref 26–35)
MCHC RBC AUTO-ENTMCNC: 30.8 % (ref 32–34.5)
MCV RBC AUTO: 96.1 FL (ref 80–99.9)
METER GLUCOSE: 120 MG/DL (ref 74–99)
METER GLUCOSE: 122 MG/DL (ref 74–99)
METER GLUCOSE: 137 MG/DL (ref 74–99)
METER GLUCOSE: 256 MG/DL (ref 74–99)
MONOCYTES ABSOLUTE: 1.08 E9/L (ref 0.1–0.95)
MONOCYTES RELATIVE PERCENT: 7.3 % (ref 2–12)
NEUTROPHILS ABSOLUTE: 10.64 E9/L (ref 1.8–7.3)
NEUTROPHILS RELATIVE PERCENT: 71.9 % (ref 43–80)
PDW BLD-RTO: 17.2 FL (ref 11.5–15)
PLATELET # BLD: 500 E9/L (ref 130–450)
PMV BLD AUTO: 10.4 FL (ref 7–12)
POTASSIUM SERPL-SCNC: 4.2 MMOL/L (ref 3.5–5)
RBC # BLD: 3.07 E12/L (ref 3.5–5.5)
SODIUM BLD-SCNC: 142 MMOL/L (ref 132–146)
TOTAL PROTEIN: 6.5 G/DL (ref 6.4–8.3)
WBC # BLD: 14.8 E9/L (ref 4.5–11.5)

## 2022-03-20 PROCEDURE — 36415 COLL VENOUS BLD VENIPUNCTURE: CPT

## 2022-03-20 PROCEDURE — 2580000003 HC RX 258: Performed by: SURGERY

## 2022-03-20 PROCEDURE — 6360000002 HC RX W HCPCS: Performed by: SURGERY

## 2022-03-20 PROCEDURE — 6360000002 HC RX W HCPCS: Performed by: INTERNAL MEDICINE

## 2022-03-20 PROCEDURE — 85025 COMPLETE CBC W/AUTO DIFF WBC: CPT

## 2022-03-20 PROCEDURE — 2500000003 HC RX 250 WO HCPCS: Performed by: INTERNAL MEDICINE

## 2022-03-20 PROCEDURE — 82962 GLUCOSE BLOOD TEST: CPT

## 2022-03-20 PROCEDURE — 6370000000 HC RX 637 (ALT 250 FOR IP): Performed by: SURGERY

## 2022-03-20 PROCEDURE — 2580000003 HC RX 258: Performed by: INTERNAL MEDICINE

## 2022-03-20 PROCEDURE — C9113 INJ PANTOPRAZOLE SODIUM, VIA: HCPCS | Performed by: SURGERY

## 2022-03-20 PROCEDURE — 2060000000 HC ICU INTERMEDIATE R&B

## 2022-03-20 PROCEDURE — 80053 COMPREHEN METABOLIC PANEL: CPT

## 2022-03-20 PROCEDURE — 36592 COLLECT BLOOD FROM PICC: CPT

## 2022-03-20 RX ORDER — DEXTROSE MONOHYDRATE 25 G/50ML
12.5 INJECTION, SOLUTION INTRAVENOUS PRN
Status: DISCONTINUED | OUTPATIENT
Start: 2022-03-20 | End: 2022-03-20 | Stop reason: RX

## 2022-03-20 RX ORDER — NICOTINE POLACRILEX 4 MG
15 LOZENGE BUCCAL PRN
Status: DISCONTINUED | OUTPATIENT
Start: 2022-03-20 | End: 2022-03-22 | Stop reason: HOSPADM

## 2022-03-20 RX ORDER — DEXTROSE MONOHYDRATE 50 MG/ML
100 INJECTION, SOLUTION INTRAVENOUS PRN
Status: DISCONTINUED | OUTPATIENT
Start: 2022-03-20 | End: 2022-03-22 | Stop reason: HOSPADM

## 2022-03-20 RX ADMIN — MORPHINE SULFATE 2 MG: 2 INJECTION, SOLUTION INTRAMUSCULAR; INTRAVENOUS at 16:29

## 2022-03-20 RX ADMIN — PETROLATUM: 420 OINTMENT TOPICAL at 08:38

## 2022-03-20 RX ADMIN — Medication 10 ML: at 08:39

## 2022-03-20 RX ADMIN — PETROLATUM: 420 OINTMENT TOPICAL at 20:17

## 2022-03-20 RX ADMIN — PANTOPRAZOLE SODIUM 40 MG: 40 INJECTION, POWDER, FOR SOLUTION INTRAVENOUS at 20:16

## 2022-03-20 RX ADMIN — ANTI-FUNGAL POWDER MICONAZOLE NITRATE TALC FREE: 1.42 POWDER TOPICAL at 08:38

## 2022-03-20 RX ADMIN — FLUCONAZOLE IN SODIUM CHLORIDE 200 MG: 2 INJECTION, SOLUTION INTRAVENOUS at 08:41

## 2022-03-20 RX ADMIN — Medication 10 ML: at 20:18

## 2022-03-20 RX ADMIN — SODIUM ACETATE: 164 INJECTION, SOLUTION, CONCENTRATE INTRAVENOUS at 18:00

## 2022-03-20 RX ADMIN — HEPARIN 300 UNITS: 100 SYRINGE at 20:16

## 2022-03-20 RX ADMIN — ANTI-FUNGAL POWDER MICONAZOLE NITRATE TALC FREE: 1.42 POWDER TOPICAL at 20:18

## 2022-03-20 RX ADMIN — PANTOPRAZOLE SODIUM 40 MG: 40 INJECTION, POWDER, FOR SOLUTION INTRAVENOUS at 08:37

## 2022-03-20 RX ADMIN — VANCOMYCIN HYDROCHLORIDE 1250 MG: 10 INJECTION, POWDER, LYOPHILIZED, FOR SOLUTION INTRAVENOUS at 05:01

## 2022-03-20 RX ADMIN — HEPARIN 300 UNITS: 100 SYRINGE at 08:48

## 2022-03-20 RX ADMIN — PIPERACILLIN SODIUM AND TAZOBACTAM SODIUM 3375 MG: 3; 375 INJECTION, POWDER, FOR SOLUTION INTRAVENOUS at 08:43

## 2022-03-20 ASSESSMENT — PAIN SCALES - GENERAL
PAINLEVEL_OUTOF10: 0
PAINLEVEL_OUTOF10: 8
PAINLEVEL_OUTOF10: 0
PAINLEVEL_OUTOF10: 0

## 2022-03-20 ASSESSMENT — PAIN SCALES - WONG BAKER
WONGBAKER_NUMERICALRESPONSE: 0

## 2022-03-20 ASSESSMENT — PAIN SCALES - PAIN ASSESSMENT IN ADVANCED DEMENTIA (PAINAD)
NEGVOCALIZATION: 0
TOTALSCORE: 0
FACIALEXPRESSION: 0
BREATHING: 0
BODYLANGUAGE: 0
CONSOLABILITY: 0

## 2022-03-20 NOTE — PROGRESS NOTES
Pharmacy Consultation Note  (Antibiotic Dosing and Monitoring)    Initial consult date: 3/9/22  Consulting physician/provider: Dr. Megha Sullivan  Drug: Vancomycin  Indication: MRSA bacteremia    Age/  Gender Height Weight IBW  Allergy Information   76 y.o./female 4' 11\" (149.9 cm) 115 lb (52.2 kg)     Patient must be at least 60 in tall to calculate ideal body weight   Abilify [aripiprazole], Depakote [divalproex sodium], Dye [iodides], Geodon [ziprasidone hcl], Levsin [hyoscyamine], Lithium, Neurontin [gabapentin], Risperidone and related, Tegretol [carbamazepine], Tramadol, Trileptal [oxcarbazepine], and Zyprexa [olanzapine]      Renal Function:  Recent Labs     03/18/22  0510 03/19/22  0520 03/20/22  0508   BUN 18 21 22   CREATININE 0.5 0.5 0.6       Intake/Output Summary (Last 24 hours) at 3/20/2022 1618  Last data filed at 3/20/2022 1455  Gross per 24 hour   Intake 0 ml   Output 1900 ml   Net -1900 ml       Vancomycin Monitoring:  Trough:    No results for input(s): VANCOTROUGH in the last 72 hours. Random:  No results for input(s): VANCORANDOM in the last 72 hours. Vancomycin Administration Times:  Recent vancomycin administrations                   vancomycin (VANCOCIN) 1,250 mg in dextrose 5 % 250 mL IVPB ()  Restarted 03/18/22 0431     1,250 mg New Bag  0424     1,250 mg New Bag 03/17/22 0422     1,250 mg New Bag 03/16/22 0409                  Assessment:  · Patient is a 67 y.o. female who has been initiated on vancomycin for MRSA bacteremia per chart review. The positive cultures are from an outside facility that were reported, so has not been treated. Repeat blood cultures have been collected here and show NGTD.   · CrCl ~40-50 mL/min  · To dose vancomycin, pharmacy will be utilizing [employer] calculation software for goal AUC/CLIFF 400-600 mg/L-hr   · 3/11: Level collected yesterday @ 1132 (instead of today @ 1430) = 13.2 mcg/mL, AUC/CLIFF 717  · 3/17: Trough @ 340 = 13 mcg/mL;     Plan:  · Vancomycin 1,250 IV every 24 hours  · Repeat trough as needed  · Will continue to monitor renal function   · Clinical pharmacy will continue to follow      Shirley Abdul PharmD 3/20/2022 4:18 PM   [phone]

## 2022-03-20 NOTE — PROGRESS NOTES
Internal Medicine Progress Note    [facility]    Lewis Peter. Sera Sanchez, STEVOOJillianIJillian Pop D.O., STEVOOLUCHO Rosas, MSN, APRN, NP-C  Yessica Man. Jesus Ayoub, MSN, APRN-CNP     Primary Care Physician: Jeovany Lincoln MD   Admitting Physician:  Edwin Sofia DO  Admission date and time: 3/8/2022  6:25 PM    Room:  [address] [place] [address]  Admitting diagnosis: Sepsis, unspecified organism [A41.9]  Sepsis [facility]) [A41.9]    Patient Name: Tina Jane  MRN: [id number]    Date of Service: 3/20/2022     Subjective:  Ottoniel Martinez is a 67 y.o. female who was seen and examined today,3/20/2022, at the bedside. Patient easily awakened. .  She does engage in conversation. Admits to fatigue. Patient admits to mild abdominal discomfort. Fatigued and weak. Dry mouth. No family present. Review of System:   ROS: Review of rest of 12 systems negative except as mentioned above-subjective section      Physical Exam:  No intake/output data recorded. Intake/Output Summary (Last 24 hours) at 3/20/2022 1420  Last data filed at 3/20/2022 0958  Gross per 24 hour   Intake 0 ml   Output 1900 ml   Net -1900 ml   I/O last 3 completed shifts: In: 978 [IV Piggyback:108.9]  Out: 3000 [Urine:3000]  Patient Vitals for the past 96 hrs (Last 3 readings):   Weight   03/20/22 0545 106 lb 14.4 oz (48.5 kg)   03/20/22 0412 110 lb 6.4 oz (50.1 kg)   03/19/22 0213 115 lb (52.2 kg)     Vital Signs:   Blood pressure 115/61, pulse 110, temperature 96.9 °F (36.1 °C), temperature source Infrared, resp. rate 18, height 4' 11\" (1.499 m), weight 106 lb 14.4 oz (48.5 kg), SpO2 93 %. General appearance:  Awake and alert. Animated and converses. In no acute distress. Head:  Normocephalic. No masses, lesions or tenderness. Eyes:  PERRLA. EOMI. Sclera clear. ENT:  Ears normal. Mucosa is dry-crusting noted on roof of mouth and lips. Mouth care given. Chapstick applied to lips.   Neck: Supple. Trachea midline. No thyromegaly. No JVD. No bruits. Heart:    Rhythm regular. Rate controlled. No murmurs. Lungs:    Diminished with bibasilar rales. Abdomen: Bowel sounds are present. Patient mitts to tenderness with palpation. Previous PEG tube site with dressing. No overt signs of drainage. New PEG tube intact. No drainage noted on dressing. Extremities:    Peripheral pulses present. No peripheral edema. No ulcers. No cyanosis. No clubbing. Neurologic:    Awake and alert. Converses today. Psych:   Behavior is normal. Mood appears normal.    Musculoskeletal:   Spine ROM normal. Muscular strength weak. Gait not assessed. Integumentary:  Excoriation under the breasts- yeast.   Genitalia/Breast:  Voiding with use of a Carrera catheter.     Medication:  Scheduled Meds:   lidocaine PF  5 mL IntraDERmal Once    sodium chloride flush  5-40 mL IntraVENous 2 times per day    heparin flush  3 mL IntraVENous 2 times per day    levothyroxine  50 mcg Oral Daily    sertraline  100 mg Oral Daily    buPROPion  50 mg Oral Q6H    vancomycin  1,250 mg IntraVENous Q24H    pantoprazole  40 mg IntraVENous BID    miconazole   Topical BID    Petrolatum   Topical BID    fluconazole  200 mg IntraVENous Q24H    [Held by provider] heparin (porcine)  5,000 Units SubCUTAneous 3 times per day     Continuous Infusions:   PN-Adult  3 IN 1 Central Line (Custom)      PN-Adult  3 IN 1 Central Line (Custom) 66.7 mL/hr at 03/20/22 0749    sodium chloride      sodium chloride Stopped (03/18/22 0430)    sodium chloride      sodium chloride         Objective Data:  CBC with Differential:    Lab Results   Component Value Date    WBC 14.8 03/20/2022    RBC 3.07 03/20/2022    HGB 9.1 03/20/2022    HCT 29.5 03/20/2022     03/20/2022    MCV 96.1 03/20/2022    MCH 29.6 03/20/2022    MCHC 30.8 03/20/2022    RDW 17.2 03/20/2022    NRBC 0.9 03/09/2022    METASPCT 2.0 03/12/2022    LYMPHOPCT 11.0 03/20/2022 MONOPCT 7.3 03/20/2022    MYELOPCT 2.0 03/12/2022    BASOPCT 0.4 03/20/2022    MONOSABS 1.08 03/20/2022    LYMPHSABS 1.62 03/20/2022    EOSABS 1.22 03/20/2022    BASOSABS 0.06 03/20/2022     CMP:    Lab Results   Component Value Date     03/20/2022    K 4.2 03/20/2022     03/20/2022    CO2 25 03/20/2022    BUN 22 03/20/2022    CREATININE 0.6 03/20/2022    GFRAA >60 03/20/2022    LABGLOM >60 03/20/2022    GLUCOSE 122 03/20/2022    PROT 6.5 03/20/2022    LABALBU 3.0 03/20/2022    CALCIUM 10.0 03/20/2022    BILITOT 0.4 03/20/2022    ALKPHOS 90 03/20/2022    AST 41 03/20/2022    ALT 29 03/20/2022       Wound Documentation:   Wound 03/10/22 Abdomen Medial;Upper (Active)   Dressing Status Other (Comment) 03/10/22 1319   Drainage Amount Scant 03/10/22 1319   Number of days: 0       Assessment:  1. Sepsis in the setting of dislodged PEG tube with developing abscess and intra-abdominal infection status post new PEG tube insertion  2. MRSA bacteremia  3. PEG site complication with gastrocutaneous fistula-continue n.p.o. and TPN. 4. Fecal impaction with ongoing improvement  5. Anemia of chronic disease  6. AMS likely secondary to Invega injection  7. Paroxysmal atrial fibrillation  8. Pubic rami fractures  9. Intertrigo under the breasts bilaterally   10. Failure to thrive with severe protein calorie malnutrition  11. Neuromuscular disorder of undetermined etiology with known psychiatric dysfunction as per the chart  12. Hypothyroidism  13. Essential hypertension  14. Hyperlipidemia      Plan:   · Patient remains animated and conversive. · Remains on TPN for nutritional support at this time secondary to gastrocutaneous fistula/PEG tube complication pain n.p.o. as per general surgery. · Hospice services were consulted yesterday at the request of the family. Awaiting medications. Patient son did have surgery and per progress notes hospice services has not been able to talk with him yet.   · ID is following and antibiotic therapy is at their discretion. · PT/OT to evaluate and treat. · WBCs are trending downward. · Monitor hemoglobin and transfuse as warranted. ·  is following for discharge planning. Awaiting recommendations for hospice/versus LTAC. .    More than 50% of my  time was spent at the bedside counseling/coordinating care with the patient and/or family with face to face contact. This time was spent reviewing notes and laboratory data as well as instructing and counseling the patient. Time I spent with the family or surrogate(s) is included only if the patient was incapable of providing the necessary information or participating in medical decisions. I also discussed the differential diagnosis and all of the proposed management plans with the patient and individuals accompanying the patient. Sylvester Awaisdahlia requires this high level of physician care and nursing on the [facility] unit due the complexity of decision management and chance of rapid decline or death. Continued cardiac monitoring and higher level of nursing are required. I am readily available for any further decision-making and intervention. The patient was seen, examined and then discussed with Dr. Shayna Wellington. JOHN Michael - CNP  2:20 PM  3/20/2022       I saw and evaluated the patient. I agree with the findings and the plan of care as documented in Behzad Serna NP-C's  note.     Gael Koyanagi, DO, D.O., Roula Forrest  2:52 PM  3/20/2022

## 2022-03-20 NOTE — PROGRESS NOTES
[facility]  [facility]  Progress Note    NAME: Lorie Cordoba  MR:  [id number]  :   1945  DATE OF SERVICE:22    This is a face to face encounter with Lorie Cordoba 67 y.o. female on 22    CHIEF COMPLAINT     ID following atbx/mrsa bacreremia   HISTORY OF PRESENT ILLNESS   Pt seen and examined  22   In bed on her side  nad  Peg on tpn    3/19/2022  In bed awake and more alert   nad  No pain    3/18/2022  Patient has been afebrile. In bed- on room air. Arousable. On TPN.     3/17/2022  In bed frail   On o2 on tpn no leakage from peg      In bed on o2 on TPN  picc peg no d/c    3/15/2022  In bed pale falls asleep easily    3/14/2022  Awake- in bed  PEG tube is not working  Patient had bloody stools this am.   She has been afebrile. Tachy     Son at bedside and updated. 3/13/2022  In bed working with pt  Son present and updated  Tmax 99.7 2L weakness    3/12/2022  PER STAFF PT HAD A FEVER FEFE505.8 4L  HER TF IS GOING THROUGH HER STOMA BAG   SHE HAS NO C/O F/C/N/V/D    3/11/2022  More awake and alert has no c/o   Afebrile 3L    3/10/2022  Seen postop EGD ESOPHAGOGASTRODUODENOSCOPY PEG TUBE INSERTION, exam under anesthesia, manual fecal disimpaction, control perianal hemorrhage, rectal packing     nad  Has pain     Patient is tolerating medications. No reported adverse drug reactions. REVIEW OF SYSTEMS     As stated above in the chief complaint, otherwise negative.   CURRENT MEDICATIONS      lidocaine PF  5 mL IntraDERmal Once    sodium chloride flush  5-40 mL IntraVENous 2 times per day    heparin flush  3 mL IntraVENous 2 times per day    levothyroxine  50 mcg Oral Daily    sertraline  100 mg Oral Daily    buPROPion  50 mg Oral Q6H    vancomycin  1,250 mg IntraVENous Q24H    pantoprazole  40 mg IntraVENous BID    miconazole   Topical BID    Petrolatum   Topical BID    piperacillin-tazobactam  3,375 mg IntraVENous Q8H    fluconazole  200 mg IntraVENous Q24H    [Held by provider] heparin (porcine)  5,000 Units SubCUTAneous 3 times per day     Continuous Infusions:   PN-Adult  3 IN 1 Central Line (Custom)      PN-Adult  3 IN 1 Central Line (Custom) 66.7 mL/hr at 22 0749    sodium chloride      sodium chloride Stopped (22 0430)    sodium chloride      sodium chloride       PRN Meds:glycerin moisturizing mouth spray, sodium phosphate IVPB **OR** sodium phosphate IVPB, acetaminophen, sodium chloride, potassium chloride, sodium chloride flush, sodium chloride, heparin flush, sodium chloride, acetaminophen, Petrolatum **AND** Petrolatum, sodium chloride, morphine, ondansetron, albuterol    PHYSICAL EXAM     /75   Pulse 108   Temp 100.4 °F (38 °C) (Oral)   Resp 20   Ht 4' 11\" (1.499 m)   Wt 106 lb 14.4 oz (48.5 kg)   SpO2 95%   BMI 21.59 kg/m²   Temp  Av.6 °F (37 °C)  Min: 97.1 °F (36.2 °C)  Max: 100.4 °F (38 °C)  Constitutional:  The patient is awake   Skin:    Warm and dry. HEENT:     AT/NC  Chest:    Symmetrical expansion. Dec bs ant  2l no wheeze   Cardiovascular:  S1 and S2 are rhythmic and regular. Abdomen:   Positive bowel sounds to auscultation. peg binder stoma   Extremities:      Edema. Dec rom   CNS     no acute distress awake and alert   Lines: left upper arm with line- no phlebitis. Carrera yellow CLEAR    DIAGNOSTIC RESULTS   Radiology:  3/14 ct a/p   1.  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 2.  No intra-abdominal or abdominal wall abscess. 3.  Stable pelvic fractures on the left. 4.  No evidence of intestinal obstruction or fecal impaction. 5.  Slight increase in size of small bilateral pleural effusions. 6.  Gastrostomy tube and urinary Carrera catheter appear to be in appropriate position  .      Recent Labs     22  0510 22  0520 22  0508   WBC 16.2* 16.1* 14.8*   RBC 2.88* 3.02* 3.07*   HGB 8.6* 9.0* 9.1*   HCT 27.5* 29.0* 29.5* MCV 95.5 96.0 96.1   MCH 29.9 29.8 29.6   MCHC 31.3* 31.0* 30.8*   RDW 17.3* 17.4* 17.2*    482* 500*   MPV 10.2 10.3 10.4     Recent Labs     03/18/22  0510 03/18/22  0510 03/18/22  1030 03/19/22  0520 03/20/22  0508     --   --  144 142   K 3.4*   < > 4.8 4.5 4.2   *  --   --  110* 107   CO2 26  --   --  26 25   BUN 18  --   --  21 22   CREATININE 0.5  --   --  0.5 0.6   GLUCOSE 139*  --   --  98 122*   PROT 6.1*  --   --  6.5 6.5   LABALBU 2.9*  --   --  3.0* 3.0*   CALCIUM 9.7  --   --  10.1 10.0   BILITOT 0.4  --   --  0.4 0.4   ALKPHOS 69  --   --  75 90   AST 28  --   --  21 41*   ALT 21  --   --  18 29    < > = values in this interval not displayed. Lab Results   Component Value Date    CRP 31.9 (H) 03/08/2022     Lab Results   Component Value Date    SEDRATE 109 (H) 03/08/2022     Recent Labs     03/18/22  0510 03/19/22  0520 03/20/22  0508   AST 28 21 41*   ALT 21 18 29     Microbiology:   No results for input(s): COVID19 in the last 72 hours.   Lab Results   Component Value Date    BC 5 Days no growth 03/12/2022    BC 5 Days no growth 03/08/2022    BLOODCULT2 24 Hours no growth 03/17/2022    BLOODCULT2 5 Days no growth 03/12/2022    BLOODCULT2 5 Days no growth 03/08/2022    ORG Staphylococcus aureus 03/08/2022    ORG Klebsiella oxytoca 07/01/2014     Travessa Circe 852        urine 2/16/2022       FINAL IMPRESSION     Admitted for Sepsis   On treatment for MRSA sepsis/uti KADEN neg   Peg infection s/p reinsertion new site NPO on TPN  Gib/fecal impaction   leukocytosis reactive better  FEVERS     Plan:   · Continue vancomycin for 4 weeks from 3/8/2022  · Pharmacy dosing   · Continue zosyn day #10/10  · Continue diflucan for now   · GI/surgery  following    · Continue wound care   · Monitor labs-  · Family is to decide proceed with Hospice care      vancomycin (VANCOCIN) 1,250 mg in dextrose 5 % 250 mL IVPB, Q24H  miconazole (MICOTIN) 2 % powder, BID  piperacillin-tazobactam (ZOSYN) 3,375 mg in dextrose 5 % 50 mL IVPB extended infusion (mini-bag), Q8H will stop   fluconazole (DIFLUCAN) in 0.9 % sodium chloride IVPB 200 mg, Q24H      Imaging and labs were reviewed. Ugo Walton had the opportunity to ask questions. All questions were answered. Thank you for involving me in the care of Ugo Walton. Please do not hesitate to call for any questions or concerns.     Electronically signed by Valentine Gómez MD on 3/20/2022 at 1:54 PM    '

## 2022-03-21 LAB
ALBUMIN SERPL-MCNC: 2.9 G/DL (ref 3.5–5.2)
ALP BLD-CCNC: 101 U/L (ref 35–104)
ALT SERPL-CCNC: 41 U/L (ref 0–32)
ANION GAP SERPL CALCULATED.3IONS-SCNC: 10 MMOL/L (ref 7–16)
ANISOCYTOSIS: ABNORMAL
AST SERPL-CCNC: 48 U/L (ref 0–31)
BASOPHILS ABSOLUTE: 0 E9/L (ref 0–0.2)
BASOPHILS RELATIVE PERCENT: 0 % (ref 0–2)
BILIRUB SERPL-MCNC: 0.3 MG/DL (ref 0–1.2)
BUN BLDV-MCNC: 24 MG/DL (ref 6–23)
CALCIUM SERPL-MCNC: 10.2 MG/DL (ref 8.6–10.2)
CHLORIDE BLD-SCNC: 108 MMOL/L (ref 98–107)
CO2: 25 MMOL/L (ref 22–29)
CREAT SERPL-MCNC: 0.6 MG/DL (ref 0.5–1)
EOSINOPHILS ABSOLUTE: 0.62 E9/L (ref 0.05–0.5)
EOSINOPHILS RELATIVE PERCENT: 4 % (ref 0–6)
GFR AFRICAN AMERICAN: >60
GFR NON-AFRICAN AMERICAN: >60 ML/MIN/1.73
GLUCOSE BLD-MCNC: 98 MG/DL (ref 74–99)
HCT VFR BLD CALC: 29.2 % (ref 34–48)
HEMOGLOBIN: 9.3 G/DL (ref 11.5–15.5)
LYMPHOCYTES ABSOLUTE: 2.64 E9/L (ref 1.5–4)
LYMPHOCYTES RELATIVE PERCENT: 17 % (ref 20–42)
MCH RBC QN AUTO: 30.2 PG (ref 26–35)
MCHC RBC AUTO-ENTMCNC: 31.8 % (ref 32–34.5)
MCV RBC AUTO: 94.8 FL (ref 80–99.9)
METER GLUCOSE: 112 MG/DL (ref 74–99)
METER GLUCOSE: 117 MG/DL (ref 74–99)
METER GLUCOSE: 126 MG/DL (ref 74–99)
METER GLUCOSE: 128 MG/DL (ref 74–99)
METER GLUCOSE: 134 MG/DL (ref 74–99)
MONOCYTES ABSOLUTE: 0.93 E9/L (ref 0.1–0.95)
MONOCYTES RELATIVE PERCENT: 6 % (ref 2–12)
MYELOCYTE PERCENT: 1 % (ref 0–0)
NEUTROPHILS ABSOLUTE: 11.32 E9/L (ref 1.8–7.3)
NEUTROPHILS RELATIVE PERCENT: 72 % (ref 43–80)
OVALOCYTES: ABNORMAL
PDW BLD-RTO: 17.3 FL (ref 11.5–15)
PLATELET # BLD: 500 E9/L (ref 130–450)
PMV BLD AUTO: 10.4 FL (ref 7–12)
POIKILOCYTES: ABNORMAL
POLYCHROMASIA: ABNORMAL
POTASSIUM SERPL-SCNC: 4.7 MMOL/L (ref 3.5–5)
RBC # BLD: 3.08 E12/L (ref 3.5–5.5)
SODIUM BLD-SCNC: 143 MMOL/L (ref 132–146)
TEAR DROP CELLS: ABNORMAL
TOTAL PROTEIN: 6.5 G/DL (ref 6.4–8.3)
WBC # BLD: 15.5 E9/L (ref 4.5–11.5)

## 2022-03-21 PROCEDURE — 2580000003 HC RX 258: Performed by: INTERNAL MEDICINE

## 2022-03-21 PROCEDURE — 36415 COLL VENOUS BLD VENIPUNCTURE: CPT

## 2022-03-21 PROCEDURE — 6360000002 HC RX W HCPCS: Performed by: SURGERY

## 2022-03-21 PROCEDURE — 2500000003 HC RX 250 WO HCPCS: Performed by: INTERNAL MEDICINE

## 2022-03-21 PROCEDURE — 87040 BLOOD CULTURE FOR BACTERIA: CPT

## 2022-03-21 PROCEDURE — 6370000000 HC RX 637 (ALT 250 FOR IP): Performed by: SURGERY

## 2022-03-21 PROCEDURE — 6370000000 HC RX 637 (ALT 250 FOR IP): Performed by: INTERNAL MEDICINE

## 2022-03-21 PROCEDURE — C9113 INJ PANTOPRAZOLE SODIUM, VIA: HCPCS | Performed by: SURGERY

## 2022-03-21 PROCEDURE — 82962 GLUCOSE BLOOD TEST: CPT

## 2022-03-21 PROCEDURE — 97110 THERAPEUTIC EXERCISES: CPT

## 2022-03-21 PROCEDURE — 85025 COMPLETE CBC W/AUTO DIFF WBC: CPT

## 2022-03-21 PROCEDURE — 80053 COMPREHEN METABOLIC PANEL: CPT

## 2022-03-21 PROCEDURE — 2060000000 HC ICU INTERMEDIATE R&B

## 2022-03-21 PROCEDURE — 6360000002 HC RX W HCPCS: Performed by: INTERNAL MEDICINE

## 2022-03-21 RX ORDER — QUETIAPINE FUMARATE 25 MG/1
25 TABLET, FILM COATED ORAL 2 TIMES DAILY
Status: DISCONTINUED | OUTPATIENT
Start: 2022-03-21 | End: 2022-03-22 | Stop reason: HOSPADM

## 2022-03-21 RX ADMIN — PANTOPRAZOLE SODIUM 40 MG: 40 INJECTION, POWDER, FOR SOLUTION INTRAVENOUS at 21:19

## 2022-03-21 RX ADMIN — QUETIAPINE FUMARATE 25 MG: 25 TABLET ORAL at 10:58

## 2022-03-21 RX ADMIN — POTASSIUM CHLORIDE: 2 INJECTION, SOLUTION, CONCENTRATE INTRAVENOUS at 19:01

## 2022-03-21 RX ADMIN — ANTI-FUNGAL POWDER MICONAZOLE NITRATE TALC FREE: 1.42 POWDER TOPICAL at 21:11

## 2022-03-21 RX ADMIN — BUPROPION HYDROCHLORIDE 50 MG: 100 TABLET, FILM COATED ORAL at 21:12

## 2022-03-21 RX ADMIN — PETROLATUM: 420 OINTMENT TOPICAL at 21:13

## 2022-03-21 RX ADMIN — PANTOPRAZOLE SODIUM 40 MG: 40 INJECTION, POWDER, FOR SOLUTION INTRAVENOUS at 09:52

## 2022-03-21 RX ADMIN — PETROLATUM: 420 OINTMENT TOPICAL at 09:53

## 2022-03-21 RX ADMIN — Medication 10 ML: at 21:12

## 2022-03-21 RX ADMIN — FLUCONAZOLE IN SODIUM CHLORIDE 200 MG: 2 INJECTION, SOLUTION INTRAVENOUS at 10:02

## 2022-03-21 RX ADMIN — QUETIAPINE FUMARATE 25 MG: 25 TABLET ORAL at 21:12

## 2022-03-21 RX ADMIN — ANTI-FUNGAL POWDER MICONAZOLE NITRATE TALC FREE: 1.42 POWDER TOPICAL at 09:52

## 2022-03-21 RX ADMIN — VANCOMYCIN HYDROCHLORIDE 1250 MG: 10 INJECTION, POWDER, LYOPHILIZED, FOR SOLUTION INTRAVENOUS at 05:45

## 2022-03-21 RX ADMIN — HEPARIN 300 UNITS: 100 SYRINGE at 21:11

## 2022-03-21 RX ADMIN — Medication 10 ML: at 09:54

## 2022-03-21 ASSESSMENT — PAIN SCALES - PAIN ASSESSMENT IN ADVANCED DEMENTIA (PAINAD)
TOTALSCORE: 3
NEGVOCALIZATION: 0
BREATHING: 1
FACIALEXPRESSION: 0
BODYLANGUAGE: 2
CONSOLABILITY: 0

## 2022-03-21 ASSESSMENT — PAIN SCALES - GENERAL: PAINLEVEL_OUTOF10: 0

## 2022-03-21 ASSESSMENT — PAIN SCALES - WONG BAKER: WONGBAKER_NUMERICALRESPONSE: 0

## 2022-03-21 NOTE — PROGRESS NOTES
GENERAL SURGERY  DAILY PROGRESS NOTE  3/21/2022    No chief complaint on file. Subjective:  No acute issues overnight, no complaints of abdominal pain this morning. Minimal strike-through on old site bandage.      Objective:  BP (!) 101/56   Pulse 108   Temp 99.1 °F (37.3 °C)   Resp 19   Ht 4' 11\" (1.499 m)   Wt 106 lb 14.4 oz (48.5 kg)   SpO2 92%   BMI 21.59 kg/m²     GENERAL:  Laying in bed, awake, alert, cooperative, no apparent distress  HEAD: Normocephalic, atraumatic  EYES: No sclera icterus, pupils equal  LUNGS:  No increased work of breathing  CARDIOVASCULAR:  RR  ABDOMEN:  Soft, non-tender, non-distended, PEG tube site clean dry and intact, previous PEG site with dressing intact mild strikethrough    Assessment/Plan:  67 y.o. female PEG site complication with gastrocutaneous fistula    Continue n.p.o. and TPN  Okay for discharge to [address] per primary  Will discuss trial meds tomorrow after 1 week of NPO    Electronically signed by Beto Streeter MD on 3/21/2022 at 9:23 AM

## 2022-03-21 NOTE — PLAN OF CARE
[facility]. No immediate plan for intervention from GI POV. Will see PRN in the hospital-please, call with questions/concerns. Thank you for the opportunity to participate in the care of Mrs.  Ciro Bailey MD

## 2022-03-21 NOTE — PROGRESS NOTES
hours  · Repeat trough tomorrow @ 0500.  Hold dose if trough is >20 mcg/mL  · Will continue to monitor renal function   · Clinical pharmacy will continue to follow      Ezekiel Tony PharmD, BCPS 3/21/2022 1:04 PM   [phone]

## 2022-03-21 NOTE — CARE COORDINATION
SOCIAL WORK / DISCHARGE PLANNING:  COVID neg 3/12. Hospice consult noted, but [facility] RN has not been able to connect yet with pt's son/POA [facility]. If wish to enroll on Hospice, may be at [facility], pt is a long term care pt there, bed hold. They are unable to do the TPN. [facility] is still following, will accept if still needed. PRECERT would need resubmitted. Addendum; 117pm Dr Yolanda De La Fuente spoke with son who wishes to pursue LTAC with continued IV atb and TPN. Sw requested PRECERT to be submitted for [facility] again.        Electronically signed by NIRANJAN Powell on 3/21/2022 at 9:56 AM

## 2022-03-21 NOTE — PROGRESS NOTES
Physical Therapy Treatment Note/Plan of Care    Room #:  9128/8163-64  Patient Name: Rajinder Gonzalez  YOB: 1945  MRN: [id number]    Date of Service: 3/21/2022     Tentative placement recommendation: Subacute rehab  Equipment recommendation: To be determined      Evaluating Physical Therapist: Mickie Encinas #[id number]      Specific Provider Orders/Date/Referring Provider :   03/08/22 2030   PT eval and treat Start: 03/08/22 2030, End: 03/08/22 2030, ONE TIME, Standing Count: 1 Occurrences, R    Tuyet Byrne DO  Admitting Diagnosis:   Sepsis, unspecified organism [A41.9]  Sepsis ([facility] [A41.9]    Surgery:  sigmoidoscopy 3/15  PEG tube placement on 3/3/202    Patient Active Problem List   Diagnosis    Odontoid fracture ([facility]    Hypothyroid    Multiple rib fractures    TMJ (temporomandibular joint syndrome)    Bipolar disorder ([facility]    Sciatica    Hiatal hernia    Abnormality of gait and mobility    Trigeminal neuralgia    Headache    Cervical neck pain with evidence of disc disease    Vomiting    Hypokalemia    Intra-abdominal infection    MRSA bacteremia    Paroxysmal atrial fibrillation (HCC)    Hypotension    Hypernatremia    Moderate protein-calorie malnutrition (HCC)    Sepsis (HCC)        ASSESSMENT of Current Deficits Patient exhibits decreased strength, balance and endurance impairing functional mobility, transfers, gait distance and tolerance to activity Nursing cleared patient for range of motion exercises in bed. Patient able to tolerate AAROM exercises. Patient PROM for hip flexion. Patient able to maintain stable vitals throughout treatment, 93% SPO2 and 114 HR after exercises. Patient requires skilled therapy to increase strength and improve balance for safe functional mobility, to decrease risk of falls, and to meet goals at discharge.          PHYSICAL THERAPY  PLAN OF CARE       Physical therapy plan of care is established based on physician order,  patient diagnosis and clinical assessment    Current Treatment Recommendations:    -Bed Mobility: Lower extremity exercises   -Sitting Balance: Incorporate reaching activities to activate trunk muscles , Hands on support to maintain midline , Facilitate active trunk muscle engagement  and Facilitate postural control in all planes   -Standing Balance: Perform strengthening exercises in standing to promote motor control with or without upper extremity support  and Instruct patient on adequate base of support to maintain balance  -Transfers: Provide instruction on proper hand and foot position for adequate transfer of weight onto lower extremities and use of gait device if needed and Cues for hand placement, technique and safety. Provide stabilization to prevent fall     PT long term treatment goals are located in below grid    Patient and or family understand(s) diagnosis, prognosis, and plan of care. Frequency of treatments: Patient will be seen  2-3 times/week.          Prior Level of Function: Patient out of bed to w/c- no history of type of transfer  Rehab Potential: good  for baseline    Past medical history:   Past Medical History:   Diagnosis Date    Allergic     Anxiety disorder     Arthritis     Difficulty walking     Dysphagia     Hyperlipidemia     Hypertension     Interstitial cystitis     Lack of coordination     Mitral valve prolapse     pt. has a mitral valve prolapse    Neuromuscular disorder (HCC)     Other disorders of kidney and ureter in diseases classified elsewhere     Pelvis fracture (HCC)     multiple fxs pelvis    Psychiatric problem     Thyroid disease     Trigeminal neuralgia     Unspecified dementia without behavioral disturbance ([facility]      Past Surgical History:   Procedure Laterality Date    FRACTURE SURGERY      GASTROSTOMY TUBE PLACEMENT N/A 3/3/2022    EGD PEG TUBE PLACEMENT performed by Lucy Quintana MD at [address] [facility] 3/15/2022    SIGMOIDOSCOPY DIAGNOSTIC FLEXIBLE performed by Rahat Mayorga MD at [address]     TRANSESOPHAGEAL ECHOCARDIOGRAM N/A 3/11/2022    TRANSESOPHAGEAL ECHOCARDIOGRAM performed by Sandee James MD at [facility] N/A 3/10/2022    EGD ESOPHAGOGASTRODUODENOSCOPY PEG TUBE INSERTION; EVACUATION OF STOOL IMPACTON performed by Rosibel Cline MD at [facility]:    Precautions:  falls , abdominal drain, armenta, O2, Incontinent of bowel  Social history: Came to hospital from [facility] ED and reported there from nursing home. Patient lis a poor historian. Equipment owned: Wheelchair,      [address]   How much difficulty turning over in bed?: A Lot  How much difficulty sitting down on / standing up from a chair with arms?: Unable  How much difficulty moving from lying on back to sitting on side of bed?: Unable  How much help from another person moving to and from a bed to a chair?: Total  How much help from another person needed to walk in hospital room?: Total  How much help from another person for climbing 3-5 steps with a railing?: Total  AM-PAC Inpatient Mobility Raw Score : 7  AM-PAC Inpatient T-Scale Score : 26.42  Mobility Inpatient CMS 0-100% Score: 92.36  Mobility Inpatient CMS G-Code Modifier : CM    Nursing cleared patient for PT treatment. OBJECTIVE;   Initial Evaluation  Date: 3/10/2022 Treatment Date:  3/21/2022     Short Term/ Long Term   Goals   Was pt agreeable to Eval/treatment? Yes yes To be met in 5 days   Pain level   0/10   Neck pain, no number assigned    Bed Mobility    Rolling: Maximal assist of 1    Supine to sit: Maximal assist of  2    Sit to supine: Maximal assist of  2    Scooting: Maximal assist of 1   Rolling: Not assessed    Supine to sit: Not assessed  d/t to pt overall debility, decreased activity tolerance, balance deficits, safety and fall risk.     Sit to supine: Not assessed    Scooting: Not assessed     Rolling: Minimal assist of 1    Supine to sit: Maximal assist of 1    Sit to supine: Maximal assist of 1    Scooting: Moderate assist of 1     Transfers Sit to stand: Not assessed  d/t to pt overall debility, decreased activity tolerance, balance deficits, safety and fall risk. Sit to stand: Not assessed  due to weakness and debility     Sit to stand: Maximal assist of 1    Ambulation    not assessed at this time due to not appropriate. Not assessed  not appropriate at this time. To re-assess at a later time if appropriate     ROM Within functional limits    Increase range of motion 10% of affected joints    Strength BUE:  refer to OT eval  RLE:  3/5  LLE:  3/5  Increase strength in affected mm groups by 1/3 grade   Balance Sitting EOB:  poor, posterior lean, max assist x2 to remain upright   Dynamic Standing:  not assessed  Sitting EOB: poor   Dynamic Standing: not assessed    Sitting EOB:  fair   Dynamic Standing: fair      Patient is Alert & Oriented x person and time and follows one step directions    Sensation:  Patient  denies numbness/tingling   Edema:  no   Endurance: poor    Vitals: room air   Blood Pressure at rest  Blood Pressure during session    Heart Rate at rest 94 Heart Rate during session 114   SPO2 at rest 93%  SPO2 during session 93%     Patient education  Patient educated on role of Physical Therapy, risks of immobility, safety and plan of care,  importance of mobility while in hospital , ankle pumps, quad set and glut set for edema control, blood clot prevention, safety  and positioning for skin integrity and comfort     Patient response to education:   Pt verbalized understanding Pt demonstrated skill Pt requires further education in this area   Yes Partial Yes      Treatment:  Patient practiced and was instructed/facilitated in the following treatment: Patient assisted with supine exercises for upper and lower extremity.           Therapeutic Exercises: AAROM/PROM ankle pumps, heel slide, straight leg raise, shoulder elevation and elbow flexion/extension  x 10-15 reps. At end of session, patient in bed with nursing present call light and phone within reach,  all lines and tubes intact, nursing notified. Patient would benefit from continued skilled Physical Therapy to improve functional independence and quality of life.          Patient's/ family goals   none stated    Time in  1336  Time out  1349    Total Treatment Time  13  minutes     CPT codes:  Therapeutic exercises (86793)   13 minutes  1 unit(s)     Nehemias Ohara, [facility] #[id number]

## 2022-03-21 NOTE — PROGRESS NOTES
Physician Progress Note      Taras Chino  CSN #:                  [id number]  :                       1945  ADMIT DATE:       3/8/2022 6:25 PM  [address] [place] DATE:  RESPONDING  PROVIDER #:        Marcelo Ward DO          QUERY TEXT:    Pt admitted with Sepsis with abscess and intra-abdominal infection at   dislodged PEG tube site that was placed 3/3/22. ?If possible, please document   in progress notes and discharge summary:      The medical record reflects the following:  Risk Factors: Previous peg tube placement  with malfunctioning PEG tube   dislodgment  Clinical Indicators: BC and aerobic wound cx + MRSA . Per op note 3/10. ..   bubbling coming from the anterior abdominal wall the previous PEG tube site. Visualization in intragastric showed evidence of gastric wall necrosis at the   site of the previous PEG tube with green appearing material.  I elected not to   replace the PEG tube through the site. Treatment: Peg tube replaced at  new site on 3/10. IV Vanc, Zosyn, Diflucan. Thank you, Keneth Gowers RN [facility] [phone]  Options provided:  -- Sepsis with abscess and intra-abdominal infection is a postoperative   complication  -- Sepsis with abscess and intra-abdominal infection is not a postoperative   complication, but is due to other incidental risk factor, Please specify other   incidental risk factor  -- Other - I will add my own diagnosis  -- Disagree - Not applicable / Not valid  -- Disagree - Clinically unable to determine / Unknown  -- Refer to Clinical Documentation Reviewer    PROVIDER RESPONSE TEXT:    Patient has Sepsis with abscess and intra-abdominal infection as a   postoperative complication.     Query created by: Donato Lennox on 3/21/2022 12:54 PM      Electronically signed by:  Marcelo Ward DO 3/21/2022 7:25 PM

## 2022-03-21 NOTE — PROGRESS NOTES
Internal Medicine Progress Note    [facility]    Alejandrina Rowan. Ranulfo Luciano., F.A.MAIOJillianI. Hannah Torres D.O., ESAU Mccall D.O. Polly Morris, MSN, APRN, NP-C  Taylor Wilhelm. Dolores Paulson, MSN, APRN-CNP     Primary Care Physician: Chente Doyle MD   Admitting Physician:  Pily Marte DO  Admission date and time: 3/8/2022  6:25 PM    Room:  [address] [place] [address]  Admitting diagnosis: Sepsis, unspecified organism [A41.9]  Sepsis [facility]) [A41.9]    Patient Name: Francis Gomez  MRN: [id number]    Date of Service: 3/21/2022     Subjective:  Sonja Mckeon is a 67 y.o. female who was seen and examined today,3/21/2022, at the bedside. Sonja Mckeon awakens to verbal stimulus and does attempt to communicate. She appears relatively comfortable at this point. She is maintained on TPN for caloric support. There is less drainage from the previous PEG tube site. She is maintained on IV antibiotics. Review of System:   Constitutional:   Persistent malaise and fatigue. HEENT:   Denies ear pain, sore throat, sinus or eye problems. Nasal cannula oxygen is in place. Cardiovascular:   Denies any chest pain, irregular heartbeats, or palpitations. Respiratory:   Denies shortness of breath, coughing, sputum production, hemoptysis, or wheezing. Gastrointestinal:   No substantial abdominal pain. PEG tube is in place. No further need for ostomy collection bag over previous PEG tube site as there is substantially less drainage  Genitourinary:    Denies any urgency, frequency, hematuria. Voiding with use of a Carrera catheter. Extremities:   Denies lower extremity swelling, edema or cyanosis. Neurology:    Chronic debility with overall weakness. Psch:   Denies being anxious or depressed. Musculoskeletal:    Denies  myalgias, joint complaints or back pain. Integumentary:   Denies any rashes, ulcers, or excoriations.  Denies bruising.   Hematologic/Lymphatic:  Denies bruising or bleeding. Physical Exam:  No intake/output data recorded. Intake/Output Summary (Last 24 hours) at 3/21/2022 1017  Last data filed at 3/21/2022 0952  Gross per 24 hour   Intake 0 ml   Output 1500 ml   Net -1500 ml   I/O last 3 completed shifts: In: 0   Out: 2350 [Urine:2350]  Patient Vitals for the past 96 hrs (Last 3 readings):   Weight   03/20/22 0545 106 lb 14.4 oz (48.5 kg)   03/20/22 0412 110 lb 6.4 oz (50.1 kg)   03/19/22 0213 115 lb (52.2 kg)     Vital Signs:   Blood pressure (!) 101/56, pulse 108, temperature 99.1 °F (37.3 °C), resp. rate 19, height 4' 11\" (1.499 m), weight 106 lb 14.4 oz (48.5 kg), SpO2 92 %. General appearance:  Awake and alert. Attempts to answer questions accordingly. Head:  Normocephalic. No masses, lesions or tenderness. Eyes:  PERRLA. EOMI. Sclera clear. ENT:  Ears normal.  Dry mucous membranes  Neck:    Supple. Trachea midline. No thyromegaly. No JVD. No bruits. Heart:    Rhythm regular. Rate controlled. No murmurs. Lungs:    Diminished breath sounds throughout  Abdomen: Bowel sounds are present. PEG tube is in place. Ostomy bag is no longer necessary over the previous PEG tube site his drainage has decreased substantially  Extremities:    Peripheral pulses present. No peripheral edema. No ulcers. No cyanosis. No clubbing. Neurologic:    Awakens to verbal stimulus. Attempts to converse. Psych:   Behavior is normal. Mood appears normal.    Musculoskeletal:   Spine ROM normal. Muscular strength weak. Gait not assessed. Integumentary:  Excoriation under the breasts  Genitalia/Breast:  Voiding with use of a Carrera catheter.     Medication:  Scheduled Meds:   insulin lispro  0-6 Units SubCUTAneous Q6H    lidocaine PF  5 mL IntraDERmal Once    sodium chloride flush  5-40 mL IntraVENous 2 times per day    heparin flush  3 mL IntraVENous 2 times per day    levothyroxine  50 mcg Oral Daily    sertraline  100 mg Oral Daily    buPROPion  50 mg Oral Q6H    vancomycin  1,250 mg IntraVENous Q24H    pantoprazole  40 mg IntraVENous BID    miconazole   Topical BID    Petrolatum   Topical BID    fluconazole  200 mg IntraVENous Q24H    [Held by provider] heparin (porcine)  5,000 Units SubCUTAneous 3 times per day     Continuous Infusions:   PN-Adult  3 IN 1 Central Line (Custom) 66.7 mL/hr at 03/20/22 1800    dextrose      sodium chloride      sodium chloride Stopped (03/18/22 0430)    sodium chloride      sodium chloride         Objective Data:  CBC with Differential:    Lab Results   Component Value Date    WBC 15.5 03/21/2022    RBC 3.08 03/21/2022    HGB 9.3 03/21/2022    HCT 29.2 03/21/2022     03/21/2022    MCV 94.8 03/21/2022    MCH 30.2 03/21/2022    MCHC 31.8 03/21/2022    RDW 17.3 03/21/2022    NRBC 0.9 03/09/2022    METASPCT 2.0 03/12/2022    LYMPHOPCT 17.0 03/21/2022    MONOPCT 6.0 03/21/2022    MYELOPCT 1.0 03/21/2022    BASOPCT 0.0 03/21/2022    MONOSABS 0.93 03/21/2022    LYMPHSABS 2.64 03/21/2022    EOSABS 0.62 03/21/2022    BASOSABS 0.00 03/21/2022     CMP:    Lab Results   Component Value Date     03/21/2022    K 4.7 03/21/2022     03/21/2022    CO2 25 03/21/2022    BUN 24 03/21/2022    CREATININE 0.6 03/21/2022    GFRAA >60 03/21/2022    LABGLOM >60 03/21/2022    GLUCOSE 98 03/21/2022    PROT 6.5 03/21/2022    LABALBU 2.9 03/21/2022    CALCIUM 10.2 03/21/2022    BILITOT 0.3 03/21/2022    ALKPHOS 101 03/21/2022    AST 48 03/21/2022    ALT 41 03/21/2022       Wound Documentation:   Wound 03/10/22 Abdomen Medial;Upper (Active)   Dressing Status Other (Comment) 03/10/22 1319   Drainage Amount Scant 03/10/22 1319   Number of days: 0       Assessment:  1. Sepsis in the setting of dislodged PEG tube with developing abscess and intra-abdominal infection status post new PEG tube insertion  2. MRSA bacteremia  3. Previous PEG site complication with gastrocutaneous fistula  4. Anemia of chronic disease  5.  AMS likely secondary to Piter Span injection  6. Paroxysmal atrial fibrillation  7. Pubic rami fractures  8. Intertrigo under the breasts bilaterally   9. Failure to thrive with severe protein calorie malnutrition  10. Neuromuscular disorder of undetermined etiology with known psychiatric dysfunction as per the chart  11. Hypothyroidism  12. Essential hypertension  13. Hyperlipidemia      Plan:   I attempted to reach the patient's son, uJstin Peter without success. I have left a callback number. She is more awake and alert when compared to my last examination. Debroah Newer will be discontinued as I believe she suffered a significant mental status changes following the injection. We will resume low-dose Seroquel to help with her anxiety. TPN is being provided for caloric support and she is maintained on IV anti-infective therapy. She continues to receive wound care for the previous PEG tube site. She requires extensive work with the therapy teams. It would be my recommendation that she be discharged to an [facility] facility for ongoing care. If improvement is not identified and she continues to decline, comfort measures may be appropriate. I will discuss this with the family when they return the telephone call. In the meantime, I have advised submitting for precertification for transfer to the [facility] facility. More than 50% of my  time was spent at the bedside counseling/coordinating care with the patient and/or family with face to face contact. This time was spent reviewing notes and laboratory data as well as instructing and counseling the patient. Time I spent with the family or surrogate(s) is included only if the patient was incapable of providing the necessary information or participating in medical decisions. I also discussed the differential diagnosis and all of the proposed management plans with the patient and individuals accompanying the patient.     Sol Mew requires this high level of physician care and nursing on the [address] [place] [address] unit due the complexity of decision management and chance of rapid decline or death. Continued cardiac monitoring and higher level of nursing are required. I am readily available for any further decision-making and intervention.      Ileana Llamas DO, D.O., [facility]  10:17 AM  3/21/2022

## 2022-03-21 NOTE — PLAN OF CARE
Problem: Falls - Risk of:  Goal: Will remain free from falls  Description: Will remain free from falls  3/21/2022 1655 by Ulysses Andrade RN  Outcome: Met This Shift     Problem: Falls - Risk of:  Goal: Absence of physical injury  Description: Absence of physical injury  3/21/2022 1655 by Ulysses Andrade RN  Outcome: Met This Shift     Problem: Skin Integrity:  Goal: Will show no infection signs and symptoms  Description: Will show no infection signs and symptoms  Outcome: Met This Shift     Problem: Skin Integrity:  Goal: Absence of new skin breakdown  Description: Absence of new skin breakdown  3/21/2022 1655 by Ulysses Andrade RN  Outcome: Met This Shift

## 2022-03-21 NOTE — SIGNIFICANT EVENT
I spoke with Nancy Galvan on the telephone this afternoon. He has been updated regarding the patient's current clinical condition. As it stands, he wishes to pursue placement at the [facility] facility for ongoing antibiotic therapy, physical therapy, and addressing of the patient's nutritional status via TPN with hopes to transition to tube feeds. I believe this is a reasonable route of treatment. If the patient were to continue to decline at the [facility] facility, considerations for comfort measures may be entertained. Multiple questions were answered accordingly.     Nomna Comer DO  12:46 PM  3/21/2022

## 2022-03-21 NOTE — PROGRESS NOTES
Comprehensive Nutrition Assessment    Type and Reason for Visit:  Reassess    Nutrition Recommendations/Plan: Continue current TPN regime as it remains to meets estimated nutrient needs. Will continue to monitor. Nutrition Assessment:  Pt remains at nutritional risk w/ moderate malnutriton & ongoing nutrition support w/ TPN 2/2 adm d/t sepsis w/ PEG infection s/p placement PTA 2/2 FTT, then disloged w/ abscess/intra-abd infection s/p new PEG placement (3/10), unable to tolerare TF 2/2 GC fistula & persistent output from previous PEG site. Note anemia/rectal bleeding s/p anal flex sigmoidoscopy w/ stercoral ulcer. Hx Dementia, Dysphagia. Continue current TPN regime. Malnutrition Assessment:  Malnutrition Status: Moderate malnutrition    Context:  Chronic Illness (Failure to thrive)     Findings of the 6 clinical characteristics of malnutrition:  Energy Intake:  7 - 75% or less estimated energy requirements for 1 month or longer  Weight Loss:  Unable to assess (no weight history provided)     Body Fat Loss:   (moderate) Orbital,Triceps   Muscle Mass Loss:   (moderate) Temples (temporalis),Clavicles (pectoralis & deltoids)  Fluid Accumulation:  No significant fluid accumulation     Strength:  Not Performed    Estimated Daily Nutrient Needs:  Energy (kcal):  4306-3181; Weight Used for Energy Requirements:  Current     Protein (g):  70-80 (x1.4-1.6gm/kg);  Weight Used for Protein Requirements:  Current        Fluid (ml/day):  6871-8071; Method Used for Fluid Requirements:  1 ml/kcal      Nutrition Related Findings:  A&Ox4, hypoactive BS, PEG clamped, I/Os WNL, soft/tender abd, TPN      Wounds:  Open Wounds,Surgical Incision (previous PEG site)       Current Nutrition Therapies:    Diet NPO  PN-Adult  3 IN 1 Central Line (Custom)    Anthropometric Measures:  · Height: 4' 11\" (149.9 cm)  · Current Body Weight: 106 lb 14.4 oz (48.5 kg) (3/20 bed scale)    · Usual Body Weight:  (no wt hx per EMR)     · Ideal Body Weight: 95 lbs; % Ideal Body Weight 112.5 %   · BMI: 21.6  · BMI Categories: Normal Weight (BMI 22.0 to 24.9) age over 72       Nutrition Diagnosis:   · Moderate malnutrition,In context of chronic illness related to cognitive or neurological impairment (Dementia w/ FTT) as evidenced by moderate muscle loss,moderate loss of subcutaneous fat,poor intake prior to admission    Nutrition Interventions:   Food and/or Nutrient Delivery:  Continue Current Parenteral Nutrition  Nutrition Education/Counseling:  Education not indicated   Coordination of Nutrition Care:  Continue to monitor while inpatient    Goals:  Continued PN tolerance       Nutrition Monitoring and Evaluation:   Behavioral-Environmental Outcomes:  None Identified   Food/Nutrient Intake Outcomes:  Parenteral Nutrition Intake/Tolerance  Physical Signs/Symptoms Outcomes:  Biochemical Data,Nutrition Focused Physical Findings,Weight,Chewing or Swallowing,Skin,GI Status,Fluid Status or Edema     Discharge Planning:     Too soon to determine     Electronically signed by Tonny Mosqueda MS, RD, LD on 3/21/22 at 10:51 AM EDT    Contact: [phone]

## 2022-03-21 NOTE — PROGRESS NOTES
Call to patients joel Stovall at [phone]. Voicemail left to please call back to discuss hospice services. Awaiting call back.     Electronically signed by Reena Darnell RN on 3/21/2022 at 9:55 AM

## 2022-03-21 NOTE — PROGRESS NOTES
[facility]  [facility]  Progress Note    NAME: Paige Rowland  MR:  [id number]  :   1945  DATE OF SERVICE:22    This is a face to face encounter with Paige Rowland 67 y.o. female on 22    CHIEF COMPLAINT     ID following atbx/mrsa bacreremia   HISTORY OF PRESENT ILLNESS   Pt seen and examined  22   Patient is in bed- lethargic but does arouse to name. On TPN  tmax- 99.1    Tachy , on room air.     3/20/2022  In bed on her side  nad  Peg on tpn    3/19/2022  In bed awake and more alert   nad  No pain    3/18/2022  Patient has been afebrile. In bed- on room air. Arousable. On TPN.     3/17/2022  In bed frail   On o2 on tpn no leakage from peg      In bed on o2 on TPN  picc peg no d/c    3/15/2022  In bed pale falls asleep easily    3/14/2022  Awake- in bed  PEG tube is not working  Patient had bloody stools this am.   She has been afebrile. Tachy     Son at bedside and updated. 3/13/2022  In bed working with pt  Son present and updated  Tmax 99.7 2L weakness    3/12/2022  PER STAFF PT HAD A FEVER [id number] 4L  HER TF IS GOING THROUGH HER STOMA BAG   SHE HAS NO C/O F/C/N/V/D    3/11/2022  More awake and alert has no c/o   Afebrile 3L    3/10/2022  Seen postop EGD ESOPHAGOGASTRODUODENOSCOPY PEG TUBE INSERTION, exam under anesthesia, manual fecal disimpaction, control perianal hemorrhage, rectal packing     nad  Has pain     Patient is tolerating medications. No reported adverse drug reactions. REVIEW OF SYSTEMS     As stated above in the chief complaint, otherwise negative.   CURRENT MEDICATIONS      QUEtiapine  25 mg Oral BID    insulin lispro  0-6 Units SubCUTAneous Q6H    lidocaine PF  5 mL IntraDERmal Once    sodium chloride flush  5-40 mL IntraVENous 2 times per day    heparin flush  3 mL IntraVENous 2 times per day    levothyroxine  50 mcg Oral Daily    sertraline  100 mg Oral Daily    buPROPion  50 mg Oral Q6H    vancomycin 1,250 mg IntraVENous Q24H    pantoprazole  40 mg IntraVENous BID    miconazole   Topical BID    Petrolatum   Topical BID    fluconazole  200 mg IntraVENous Q24H    [Held by provider] heparin (porcine)  5,000 Units SubCUTAneous 3 times per day     Continuous Infusions:   PN-Adult  3 IN 1 Central Line (Custom) 66.7 mL/hr at 22 1800    dextrose      sodium chloride      sodium chloride Stopped (22 0430)    sodium chloride      sodium chloride       PRN Meds:glucose, glucagon (rDNA), dextrose, dextrose bolus (hypoglycemia) **OR** dextrose bolus (hypoglycemia), glycerin moisturizing mouth spray, acetaminophen, sodium chloride, sodium chloride flush, sodium chloride, heparin flush, sodium chloride, acetaminophen, Petrolatum **AND** Petrolatum, sodium chloride, morphine, ondansetron, albuterol    PHYSICAL EXAM     BP (!) 101/56   Pulse 108   Temp 99.1 °F (37.3 °C)   Resp 19   Ht 4' 11\" (1.499 m)   Wt 106 lb 14.4 oz (48.5 kg)   SpO2 92%   BMI 21.59 kg/m²   Temp  Av.4 °F (36.3 °C)  Min: 96.9 °F (36.1 °C)  Max: 99.1 °F (37.3 °C)  Constitutional:  The patient is responsive- lethargic. Arouse to name. Skin:    Warm and dry. HEENT:     AT/NC  Chest:    Symmetrical expansion. Dec bs ant  Cardiovascular:  S1 and S2 are rhythmic and regular. Abdomen:   Positive bowel sounds to auscultation. peg binder stoma   Extremities:      Edema. Dec rom   CNS     no acute distress awake and alert   Lines: left upper arm with line- no phlebitis. Carrera yellow CLEAR    DIAGNOSTIC RESULTS   Radiology:  3/14 ct a/p   1.  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 2.  No intra-abdominal or abdominal wall abscess. 3.  Stable pelvic fractures on the left. 4.  No evidence of intestinal obstruction or fecal impaction. 5.  Slight increase in size of small bilateral pleural effusions. 6.  Gastrostomy tube and urinary Carrera catheter appear to be in appropriate position  . Recent Labs     03/19/22  0520 03/20/22  0508 03/21/22  0538   WBC 16.1* 14.8* 15.5*   RBC 3.02* 3.07* 3.08*   HGB 9.0* 9.1* 9.3*   HCT 29.0* 29.5* 29.2*   MCV 96.0 96.1 94.8   MCH 29.8 29.6 30.2   MCHC 31.0* 30.8* 31.8*   RDW 17.4* 17.2* 17.3*   * 500* 500*   MPV 10.3 10.4 10.4     Recent Labs     03/19/22  0520 03/20/22  0508 03/21/22  0538    142 143   K 4.5 4.2 4.7   * 107 108*   CO2 26 25 25   BUN 21 22 24*   CREATININE 0.5 0.6 0.6   GLUCOSE 98 122* 98   PROT 6.5 6.5 6.5   LABALBU 3.0* 3.0* 2.9*   CALCIUM 10.1 10.0 10.2   BILITOT 0.4 0.4 0.3   ALKPHOS 75 90 101   AST 21 41* 48*   ALT 18 29 41*     Lab Results   Component Value Date    CRP 31.9 (H) 03/08/2022     Lab Results   Component Value Date    SEDRATE 109 (H) 03/08/2022     Recent Labs     03/19/22  0520 03/20/22  0508 03/21/22  0538   AST 21 41* 48*   ALT 18 29 41*     Microbiology:   No results for input(s): COVID19 in the last 72 hours. Lab Results   Component Value Date    BC 5 Days no growth 03/12/2022    BC 5 Days no growth 03/08/2022    BLOODCULT2 24 Hours no growth 03/17/2022    BLOODCULT2 5 Days no growth 03/12/2022    BLOODCULT2 5 Days no growth 03/08/2022    ORG Staphylococcus aureus 03/08/2022    ORG Klebsiella oxytoca 07/01/2014     Travessa Circe 852        urine 2/16/2022       FINAL IMPRESSION     Admitted for Sepsis   On treatment for MRSA sepsis/uti KADEN neg   Peg infection s/p reinsertion new site NPO on TPN  Gib/fecal impaction   leukocytosis reactive better  FEVERS     Plan:   · Continue vancomycin for 4 weeks from 3/8/2022  · Pharmacy dosing   · Finished  zosyn  · Continue diflucan for now   · GI/surgery  following    · Continue wound care   · Monitor labs- WBC- 15.5   · Family is to decide proceed with Hospice care VS LTAC     Imaging and labs were reviewed. Rajinder Gonzalez had the opportunity to ask questions. All questions were answered. Thank you for involving me in the care of Rajinder Gonzalez. Please do not hesitate to call for any questions or concerns. Electronically signed by Kyung Severs, APRN - CNS on 3/21/2022 at 1:14 PM    'As above       I have participated in the medical decision making, and  POC with the [facility], Kyung Severs, APRN - CNS. Wbc15.5 low grade fever check cx  3/17 left picc    May need to add antifungal    Imaging and labs were reviewed. Thank you for involving me in the care of Ish Lopez. Please do not hesitate to call for any questions or concerns.     Electronically signed by Johnna Multani MD on 3/21/2022 at 3:44 PM

## 2022-03-22 VITALS
DIASTOLIC BLOOD PRESSURE: 68 MMHG | OXYGEN SATURATION: 94 % | BODY MASS INDEX: 21.55 KG/M2 | TEMPERATURE: 98.7 F | SYSTOLIC BLOOD PRESSURE: 120 MMHG | WEIGHT: 106.9 LBS | HEIGHT: 59 IN | HEART RATE: 120 BPM | RESPIRATION RATE: 18 BRPM

## 2022-03-22 LAB
ALBUMIN SERPL-MCNC: 2.9 G/DL (ref 3.5–5.2)
ALP BLD-CCNC: 126 U/L (ref 35–104)
ALT SERPL-CCNC: 49 U/L (ref 0–32)
ANION GAP SERPL CALCULATED.3IONS-SCNC: 11 MMOL/L (ref 7–16)
ANISOCYTOSIS: ABNORMAL
AST SERPL-CCNC: 59 U/L (ref 0–31)
BASOPHILS ABSOLUTE: 0.15 E9/L (ref 0–0.2)
BASOPHILS RELATIVE PERCENT: 0.9 % (ref 0–2)
BILIRUB SERPL-MCNC: 0.4 MG/DL (ref 0–1.2)
BUN BLDV-MCNC: 28 MG/DL (ref 6–23)
BURR CELLS: ABNORMAL
CALCIUM SERPL-MCNC: 10.8 MG/DL (ref 8.6–10.2)
CHLORIDE BLD-SCNC: 107 MMOL/L (ref 98–107)
CO2: 24 MMOL/L (ref 22–29)
CREAT SERPL-MCNC: 0.7 MG/DL (ref 0.5–1)
CULTURE, BLOOD 2: NORMAL
EOSINOPHILS ABSOLUTE: 1.46 E9/L (ref 0.05–0.5)
EOSINOPHILS RELATIVE PERCENT: 8.8 % (ref 0–6)
GFR AFRICAN AMERICAN: >60
GFR NON-AFRICAN AMERICAN: >60 ML/MIN/1.73
GLUCOSE BLD-MCNC: 104 MG/DL (ref 74–99)
HCT VFR BLD CALC: 29.8 % (ref 34–48)
HEMOGLOBIN: 9.3 G/DL (ref 11.5–15.5)
LYMPHOCYTES ABSOLUTE: 1.16 E9/L (ref 1.5–4)
LYMPHOCYTES RELATIVE PERCENT: 7 % (ref 20–42)
MCH RBC QN AUTO: 29.5 PG (ref 26–35)
MCHC RBC AUTO-ENTMCNC: 31.2 % (ref 32–34.5)
MCV RBC AUTO: 94.6 FL (ref 80–99.9)
METER GLUCOSE: 113 MG/DL (ref 74–99)
METER GLUCOSE: 123 MG/DL (ref 74–99)
METER GLUCOSE: 126 MG/DL (ref 74–99)
METER GLUCOSE: 129 MG/DL (ref 74–99)
MONOCYTES ABSOLUTE: 0.66 E9/L (ref 0.1–0.95)
MONOCYTES RELATIVE PERCENT: 3.5 % (ref 2–12)
MYELOCYTE PERCENT: 2.6 % (ref 0–0)
NEUTROPHILS ABSOLUTE: 13.28 E9/L (ref 1.8–7.3)
NEUTROPHILS RELATIVE PERCENT: 77.2 % (ref 43–80)
NUCLEATED RED BLOOD CELLS: 0.9 /100 WBC
OVALOCYTES: ABNORMAL
PDW BLD-RTO: 18 FL (ref 11.5–15)
PLATELET # BLD: 497 E9/L (ref 130–450)
PMV BLD AUTO: 10.6 FL (ref 7–12)
POIKILOCYTES: ABNORMAL
POLYCHROMASIA: ABNORMAL
POTASSIUM SERPL-SCNC: 4.9 MMOL/L (ref 3.5–5)
RBC # BLD: 3.15 E12/L (ref 3.5–5.5)
SODIUM BLD-SCNC: 142 MMOL/L (ref 132–146)
TOTAL PROTEIN: 6.6 G/DL (ref 6.4–8.3)
VANCOMYCIN TROUGH: 19.3 MCG/ML (ref 5–16)
WBC # BLD: 16.6 E9/L (ref 4.5–11.5)

## 2022-03-22 PROCEDURE — 6360000002 HC RX W HCPCS: Performed by: SURGERY

## 2022-03-22 PROCEDURE — 36415 COLL VENOUS BLD VENIPUNCTURE: CPT

## 2022-03-22 PROCEDURE — C9113 INJ PANTOPRAZOLE SODIUM, VIA: HCPCS | Performed by: SURGERY

## 2022-03-22 PROCEDURE — 36592 COLLECT BLOOD FROM PICC: CPT

## 2022-03-22 PROCEDURE — 85025 COMPLETE CBC W/AUTO DIFF WBC: CPT

## 2022-03-22 PROCEDURE — 6360000002 HC RX W HCPCS: Performed by: INTERNAL MEDICINE

## 2022-03-22 PROCEDURE — 6370000000 HC RX 637 (ALT 250 FOR IP): Performed by: SURGERY

## 2022-03-22 PROCEDURE — 82962 GLUCOSE BLOOD TEST: CPT

## 2022-03-22 PROCEDURE — 2580000003 HC RX 258: Performed by: INTERNAL MEDICINE

## 2022-03-22 PROCEDURE — 6370000000 HC RX 637 (ALT 250 FOR IP): Performed by: INTERNAL MEDICINE

## 2022-03-22 PROCEDURE — 80053 COMPREHEN METABOLIC PANEL: CPT

## 2022-03-22 PROCEDURE — 80202 ASSAY OF VANCOMYCIN: CPT

## 2022-03-22 RX ORDER — GLYCERIN 35 %
2 SPRAY, NON-AEROSOL (ML) MUCOUS MEMBRANE PRN
DISCHARGE
Start: 2022-03-22

## 2022-03-22 RX ORDER — QUETIAPINE FUMARATE 25 MG/1
25 TABLET, FILM COATED ORAL 2 TIMES DAILY
Qty: 60 TABLET | Refills: 3 | DISCHARGE
Start: 2022-03-22

## 2022-03-22 RX ORDER — PETROLATUM 420 MG/G
1 OINTMENT TOPICAL 2 TIMES DAILY
DISCHARGE
Start: 2022-03-22

## 2022-03-22 RX ORDER — PETROLATUM 420 MG/G
1 OINTMENT TOPICAL 3 TIMES DAILY PRN
DISCHARGE
Start: 2022-03-22

## 2022-03-22 RX ORDER — BUPROPION HYDROCHLORIDE 100 MG/1
50 TABLET ORAL EVERY 6 HOURS
Qty: 60 TABLET | Refills: 3 | DISCHARGE
Start: 2022-03-22

## 2022-03-22 RX ORDER — ALBUTEROL SULFATE 2.5 MG/3ML
2.5 SOLUTION RESPIRATORY (INHALATION) EVERY 6 HOURS PRN
Qty: 120 EACH | Refills: 3 | DISCHARGE
Start: 2022-03-22

## 2022-03-22 RX ADMIN — Medication 10 ML: at 09:38

## 2022-03-22 RX ADMIN — SERTRALINE 100 MG: 50 TABLET, FILM COATED ORAL at 09:22

## 2022-03-22 RX ADMIN — QUETIAPINE FUMARATE 25 MG: 25 TABLET ORAL at 09:22

## 2022-03-22 RX ADMIN — ANTI-FUNGAL POWDER MICONAZOLE NITRATE TALC FREE: 1.42 POWDER TOPICAL at 09:34

## 2022-03-22 RX ADMIN — PANTOPRAZOLE SODIUM 40 MG: 40 INJECTION, POWDER, FOR SOLUTION INTRAVENOUS at 09:22

## 2022-03-22 RX ADMIN — HEPARIN 300 UNITS: 100 SYRINGE at 09:22

## 2022-03-22 RX ADMIN — FLUCONAZOLE IN SODIUM CHLORIDE 200 MG: 2 INJECTION, SOLUTION INTRAVENOUS at 09:36

## 2022-03-22 RX ADMIN — PETROLATUM: 420 OINTMENT TOPICAL at 09:37

## 2022-03-22 RX ADMIN — VANCOMYCIN HYDROCHLORIDE 1250 MG: 10 INJECTION, POWDER, LYOPHILIZED, FOR SOLUTION INTRAVENOUS at 06:00

## 2022-03-22 ASSESSMENT — PAIN SCALES - PAIN ASSESSMENT IN ADVANCED DEMENTIA (PAINAD)
NEGVOCALIZATION: 0
FACIALEXPRESSION: 0
TOTALSCORE: 0
CONSOLABILITY: 0
BREATHING: 0
BODYLANGUAGE: 0

## 2022-03-22 ASSESSMENT — PAIN SCALES - GENERAL
PAINLEVEL_OUTOF10: 0
PAINLEVEL_OUTOF10: 0

## 2022-03-22 ASSESSMENT — PAIN SCALES - WONG BAKER: WONGBAKER_NUMERICALRESPONSE: 0

## 2022-03-22 NOTE — PROGRESS NOTES
[facility]  [facility]  Progress Note    NAME: Suleiman Pennington  MR:  [id number]  :   1945  DATE OF SERVICE:22    This is a face to face encounter with Suleiman Pennington 67 y.o. female on 22    CHIEF COMPLAINT     ID following atbx/mrsa bacreremia   HISTORY OF PRESENT ILLNESS   Pt seen and examined  22   In bed on ra awkae nad  tpn peh previous site dry but red    3/21/2022  Patient is in bed- lethargic but does arouse to name. On TPN  tmax- 99.1    Tachy , on room air.     3/20/2022  In bed on her side  nad  Peg on tpn    3/19/2022  In bed awake and more alert   nad  No pain    3/18/2022  Patient has been afebrile. In bed- on room air. Arousable. On TPN.     3/17/2022  In bed frail   On o2 on tpn no leakage from peg    /  In bed on o2 on TPN  picc peg no d/c    3/15/2022  In bed pale falls asleep easily    3/14/2022  Awake- in bed  PEG tube is not working  Patient had bloody stools this am.   She has been afebrile. Tachy     Son at bedside and updated. 3/13/2022  In bed working with pt  Son present and updated  Tmax 99.7 2L weakness    3/12/2022  PER STAFF PT HAD A FEVER [id number] 4L  HER TF IS GOING THROUGH HER STOMA BAG   SHE HAS NO C/O F/C/N/V/D    3/11/2022  More awake and alert has no c/o   Afebrile 3L    3/10/2022  Seen postop EGD ESOPHAGOGASTRODUODENOSCOPY PEG TUBE INSERTION, exam under anesthesia, manual fecal disimpaction, control perianal hemorrhage, rectal packing     nad  Has pain     Patient is tolerating medications. No reported adverse drug reactions. REVIEW OF SYSTEMS     As stated above in the chief complaint, otherwise negative.   CURRENT MEDICATIONS      QUEtiapine  25 mg Oral BID    insulin lispro  0-6 Units SubCUTAneous Q6H    lidocaine PF  5 mL IntraDERmal Once    sodium chloride flush  5-40 mL IntraVENous 2 times per day    heparin flush  3 mL IntraVENous 2 times per day    levothyroxine  50 mcg Oral Daily    sertraline  100 mg Oral Daily    buPROPion  50 mg Oral Q6H    vancomycin  1,250 mg IntraVENous Q24H    pantoprazole  40 mg IntraVENous BID    miconazole   Topical BID    Petrolatum   Topical BID    fluconazole  200 mg IntraVENous Q24H    [Held by provider] heparin (porcine)  5,000 Units SubCUTAneous 3 times per day     Continuous Infusions:   PN-Adult  3 IN 1 Central Line (Custom) 66.7 mL/hr at 22 1128    dextrose      sodium chloride      sodium chloride Stopped (22 0430)    sodium chloride      sodium chloride       PRN Meds:glucose, glucagon (rDNA), dextrose, dextrose bolus (hypoglycemia) **OR** dextrose bolus (hypoglycemia), glycerin moisturizing mouth spray, acetaminophen, sodium chloride, sodium chloride flush, sodium chloride, heparin flush, sodium chloride, acetaminophen, Petrolatum **AND** Petrolatum, sodium chloride, morphine, ondansetron, albuterol    PHYSICAL EXAM     /64   Pulse 112   Temp 98.6 °F (37 °C) (Axillary)   Resp 19   Ht 4' 11\" (1.499 m)   Wt 106 lb 14.4 oz (48.5 kg)   SpO2 93%   BMI 21.59 kg/m²   Temp  Av.1 °F (37.3 °C)  Min: 98.6 °F (37 °C)  Max: 99.5 °F (37.5 °C)  Constitutional:  The patient is responsive- lethargic. Arouse to name. Skin:    Warm and dry. HEENT:     AT/NC  Chest:    Symmetrical expansion. Dec bs ant  Cardiovascular:  S1 and S2 are rhythmic and regular. Abdomen:   Positive bowel sounds to auscultation. Peg no d/c, previous peh site red min d/c  Extremities:      Edema. Dec rom   CNS     no acute distress awake and alert   Lines: left upper arm with line- no phlebitis. Carrera yellow CLEAR    DIAGNOSTIC RESULTS   Radiology:  3/14 ct a/p   1.  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 2.  No intra-abdominal or abdominal wall abscess. 3.  Stable pelvic fractures on the left. 4.  No evidence of intestinal obstruction or fecal impaction.      5.  Slight increase in size of small bilateral pleural effusions. 6.  Gastrostomy tube and urinary Carrera catheter appear to be in appropriate position  . Recent Labs     03/20/22  0508 03/21/22  0538 03/22/22  0500   WBC 14.8* 15.5* 16.6*   RBC 3.07* 3.08* 3.15*   HGB 9.1* 9.3* 9.3*   HCT 29.5* 29.2* 29.8*   MCV 96.1 94.8 94.6   MCH 29.6 30.2 29.5   MCHC 30.8* 31.8* 31.2*   RDW 17.2* 17.3* 18.0*   * 500* 497*   MPV 10.4 10.4 10.6     Recent Labs     03/20/22  0508 03/21/22  0538 03/22/22  0500    143 142   K 4.2 4.7 4.9    108* 107   CO2 25 25 24   BUN 22 24* 28*   CREATININE 0.6 0.6 0.7   GLUCOSE 122* 98 104*   PROT 6.5 6.5 6.6   LABALBU 3.0* 2.9* 2.9*   CALCIUM 10.0 10.2 10.8*   BILITOT 0.4 0.3 0.4   ALKPHOS 90 101 126*   AST 41* 48* 59*   ALT 29 41* 49*     Lab Results   Component Value Date    CRP 31.9 (H) 03/08/2022     Lab Results   Component Value Date    SEDRATE 109 (H) 03/08/2022     Recent Labs     03/20/22  0508 03/21/22  0538 03/22/22  0500   AST 41* 48* 59*   ALT 29 41* 49*     Microbiology:   No results for input(s): COVID19 in the last 72 hours.   Lab Results   Component Value Date    BC 5 Days no growth 03/12/2022    BC 5 Days no growth 03/08/2022    BLOODCULT2 24 Hours no growth 03/17/2022    BLOODCULT2 5 Days no growth 03/12/2022    BLOODCULT2 5 Days no growth 03/08/2022    ORG Staphylococcus aureus 03/08/2022    ORG Klebsiella oxytoca 07/01/2014     Travessa Circe 852        urine 2/16/2022       FINAL IMPRESSION     Admitted for Sepsis   On treatment for MRSA sepsis/uti KADEN neg   Peg infection s/p reinsertion new site NPO on TPN  Gib/fecal impaction   leukocytosis reactive better  FEVERS     Plan:   · Continue vancomycin for 4 weeks from 3/8/2022  · Pharmacy dosing   · bactroban to old peg site   · Finished  zosyn  · Continue diflucan for now   · GI/surgery  following    · Continue wound care   · Monitor labs- WBC- 15.5   · Family is to decide proceed with Hospice care VS LTAC     Imaging and labs were

## 2022-03-22 NOTE — PROGRESS NOTES
GENERAL SURGERY  DAILY PROGRESS NOTE  3/22/2022    CC: PEG tube complication    Subjective:  Patient remains altered on exam.  New PEG tube still in place, still with mild mucus from old site    Objective:  /81   Pulse 112   Temp 99.5 °F (37.5 °C) (Axillary)   Resp 20   Ht 4' 11\" (1.499 m)   Wt 106 lb 14.4 oz (48.5 kg)   SpO2 95%   BMI 21.59 kg/m²     GENERAL:  Laying in bed, altered, no acute distress  LUNGS:  No increased work of breathing  CARDIOVASCULAR:  RR  ABDOMEN:  Soft, non-tender, non-distended.   Still draining from previous PEG site, new PEG well-positioned      Assessment/Plan:  67 y.o. female PEG tube complication    Patient seen and examined  Remains altered on exam  Continue to be n.p.o. with no usage of current PEG tube, continue to monitor mucus output from previous PEG tube site  Continue daily wound care to the site  Continue TPN   waiting area from previous PEG tube site is scarred down prior to restarting tube feeds    Electronically signed by Chas Velazquez DO on 3/22/2022 at 7:52 AM

## 2022-03-22 NOTE — FLOWSHEET NOTE
Inpatient Wound Care    Admit Date: 3/8/2022  6:25 PM    Reason for consult:  abd old peg tube site    Significant history:    Past Medical History:   Diagnosis Date    Allergic     Anxiety disorder     Arthritis     Difficulty walking     Dysphagia     Hyperlipidemia     Hypertension     Interstitial cystitis     Lack of coordination     Mitral valve prolapse     pt. has a mitral valve prolapse    Neuromuscular disorder (HCC)     Other disorders of kidney and ureter in diseases classified elsewhere     Pelvis fracture (HCC)     multiple fxs pelvis    Psychiatric problem     Thyroid disease     Trigeminal neuralgia     Unspecified dementia without behavioral disturbance ([employer] [facility] [address]        Wound history:      Findings:       03/22/22 1037   Wound 03/10/22 Abdomen Medial;Upper   Date First Assessed/Time First Assessed: 03/10/22 (c) 9140   Location: Abdomen  Wound Location Orientation: Medial;Upper   Wound Etiology Surgical   Wound Cleansed Cleansed with saline   Dressing/Treatment   (protective dressing)   Wound Assessment Pink/red   Drainage Amount Small   Shanda-wound Assessment Blanchable erythema; Excoriated       Impression:  Redness around wound from drainage    Interventions in place:    Cont to pack wound  Plan to use protective ointment aroune    Plan:  Option treatment apply pouch again  Staff to discuss with Dr Sid Galeano RN 3/22/2022 10:38 AM

## 2022-03-22 NOTE — PLAN OF CARE
Problem: Falls - Risk of:  Goal: Will remain free from falls  Description: Will remain free from falls  3/22/2022 1202 by Guillermina Joyner RN  Outcome: Completed  3/22/2022 0450 by Tatiana Friedman RN  Outcome: Met This Shift  Goal: Absence of physical injury  Description: Absence of physical injury  3/22/2022 1202 by Guillermina Joyner RN  Outcome: Completed  3/22/2022 0450 by Tatiana Friedman RN  Outcome: Met This Shift     Problem: Skin Integrity:  Goal: Will show no infection signs and symptoms  Description: Will show no infection signs and symptoms  Outcome: Completed  Goal: Absence of new skin breakdown  Description: Absence of new skin breakdown  3/22/2022 1202 by Guillermina Joyner RN  Outcome: Completed  3/22/2022 0450 by Tatiana Friedman RN  Outcome: Met This Shift     Problem: Pain:  Goal: Pain level will decrease  Description: Pain level will decrease  3/22/2022 1202 by Guillermina Joyner RN  Outcome: Completed  3/22/2022 0450 by Tatiana Friedman RN  Outcome: Met This Shift  Goal: Control of acute pain  Description: Control of acute pain  3/22/2022 1202 by Guillermina Joyner RN  Outcome: Completed  3/22/2022 0450 by Tatiana Friedman RN  Outcome: Met This Shift  Goal: Control of chronic pain  Description: Control of chronic pain  3/22/2022 1202 by Guillermina Joyner RN  Outcome: Completed  3/22/2022 0450 by Tatiana Friedman RN  Outcome: Met This Shift

## 2022-03-22 NOTE — CARE COORDINATION
SOCIAL WORK / DISCHARGE PLANNING:  COVID neg 3/12. [address] obtained for [facility] [phone] fax # [phone]. Pt to receive IV Vanco x 4 weeks from 3/8, remains on TPN. Transport will be arranged via Ambulance when dc order is placed. No COVID test needed per [facility], rep. Addendum: 1129am Pt to discharge today to [facility] Room 730. Transport arranged via [facility] Chari Renteria RN aware. Sw notified pt's sister in person and then left voice message notification for son, Lakeshia Vizcarra regarding dc and time of transport.              Electronically signed by NIRANJAN Yates on 3/22/2022 at 9:26 AM

## 2022-03-22 NOTE — DISCHARGE SUMMARY
Internal Medicine Progress Note     [facility]     Brian Kavon. Minnie Dickerson., F.A.C.OEARNEST Boss D.O., F.A.C.OJillianIJillian Kang D.O. Nancy Winston, MSN, APRN, NP-C  Lily Culver. Lizett Ayoub, MSN, APRN-CNP       Internal Medicine  Discharge Summary    NAME: Anup Ceja  :    MRN:  [id number]  PCP:Dav Law MD  ADMITTED: 3/8/2022      DISCHARGED: 3/22/22    ADMITTING PHYSICIAN: Krysta Granger DO    CONSULTANT(S):   IP CONSULT TO GENERAL SURGERY  IP CONSULT TO DIETITIAN  IP CONSULT TO INFECTIOUS DISEASES  IP CONSULT TO CARDIOLOGY  IP CONSULT TO PHARMACY  IP CONSULT TO SOCIAL WORK  IP CONSULT TO GI  IP CONSULT TO DIETITIAN  IP CONSULT TO SOCIAL WORK  IP CONSULT TO DIETITIAN  IP CONSULT TO PSYCHIATRY  IP CONSULT TO HOSPICE     ADMITTING DIAGNOSIS:   Sepsis, unspecified organism [A41.9]  Sepsis ([facility] [address] [A41.9]     DISCHARGE DIAGNOSES:   1. Sepsis in the setting of dislodged PEG tube with developing abscess and intra-abdominal infection status post new PEG tube insertion  2. MRSA bacteremia  3. Previous PEG site complication with gastrocutaneous fistula  4. Anemia of chronic disease  5. AMS likely secondary to Invega injection  6. Paroxysmal atrial fibrillation  7. Pubic rami fractures  8. Intertrigo under the breasts bilaterally   9. Failure to thrive with severe protein calorie malnutrition  10. Neuromuscular disorder of undetermined etiology with known psychiatric dysfunction as per the chart  11. Hypothyroidism  12. Essential hypertension  13. Hyperlipidemia      BRIEF HISTORY OF PRESENT ILLNESS:   Jamia Hancock is a 41-year-old female who presented to [address] [place] [address] emergency department directly from [facility] emergency department yesterday. She had undergone PEG tube placement on 3/3/2022 in the setting of failure to thrive status. She was discharged to the nursing home thereafter and unfortunately dislodged her PEG tube recently.   Work-up in the emergency department there revealed infected PEG site and the development of intra-abdominal abscess. Preliminary blood cultures are indicating MRSA x2 bottles. She is confused on examination today without family members present. She is overall ill-appearing and has been placed on broad-spectrum antibiotic therapy. Multiple subspecialists have been asked to provide consultation.     LABS[de-identified]  Lab Results   Component Value Date    WBC 16.6 (H) 03/22/2022    HGB 9.3 (L) 03/22/2022    HCT 29.8 (L) 03/22/2022     (H) 03/22/2022     03/22/2022    K 4.9 03/22/2022     03/22/2022    CREATININE 0.7 03/22/2022    BUN 28 (H) 03/22/2022    CO2 24 03/22/2022    GLUCOSE 104 (H) 03/22/2022    ALT 49 (H) 03/22/2022    AST 59 (H) 03/22/2022    INR 1.4 03/09/2022     Lab Results   Component Value Date    INR 1.4 03/09/2022    INR 1.1 05/08/2014    PROTIME 15.8 (H) 03/09/2022    PROTIME 11.6 05/08/2014      Lab Results   Component Value Date    TSH 4.880 (H) 03/10/2022     Lab Results   Component Value Date    TRIG 121 03/16/2022     No results found for: HDL  No results found for: LDLCALC  No results found for: LABA1C    IMAGING:  ECHO Transesophageal    Result Date: 3/11/2022  Transesophageal Echocardiography Report (KADEN)   Demographics   Patient Name       [facility]       Gender               Female                     [facility]   Medical Record     [id number]       Room Number          2213  Number   Account #          [de-identified]      Procedure Date       03/11/2022   Corporate ID                      Ordering Physician   Melissa RAZA   Accession Number   [id number]     Referring Physician   Date of Birth      1945     Sonographer          Grant Jernigan [facility]   Age                67 year(s)     Interpreting         Melissa Lebron MD                                    Physician                                     Any Other  Procedure Type of Study   KADEN procedure:Transesophageal Echo (KADEN), Color Flow Velocity Mapping. Procedure Date Date: 03/11/2022 Start: 01:31 PM Study Location: Portable Technical Quality: Adequate visualization Indications:R/O Endocarditis. Patient Status: Routine Height: 59 inches Weight: 115 pounds BSA: 1.46 m^2 BMI: 23.23 kg/m^2 BP: 104/64 mmHg KADEN Performed By: the attending and the sonographer  Type of Anesthesia: Moderate sedation   Findings   Left Ventricle  Left ventricular ejection fraction is grossly normal.   Right Ventricle  Right ventricle global systolic function is normal .   Left Atrium  No evidence of thrombus within left atrium   Right Atrium  Normal right atrium size. Mitral Valve  Structurally normal mitral valve. Mild mitral regurgitation is present. No vegetation noted on mitral valve. Tricuspid Valve  The tricuspid valve appears structurally normal.  No tricuspid valve vegetation or masses visualized. Aortic Valve  The aortic valve appears mildly sclerotic. Aortic valve opens well. The  aortic valve is trileaflet. No evidence of aortic valve regurgitation. No  evidence of mass or vegetation noted on the aortic valve. Pulmonic Valve  The pulmonic valve was not well visualized. No evidence of any pulmonic regurgitation. Pericardial Effusion  No pericardial effusion. Aorta  Moderate , protruding , immobile plaque noted in the ascending aorta. Conclusions   Summary  No previous echo for comparison. Technically adequate study. Left ventricular ejection fraction is grossly normal.  Structurally normal mitral valve. Mild mitral regurgitation is present. No vegetation noted on mitral valve. No tricuspid valve vegetation or masses visualized. No evidence of mass or vegetation noted on the aortic valve. Moderate , protruding , immobile plaque noted in the ascending aorta.    Signature   ----------------------------------------------------------------  Electronically signed by Gamaliel Martinez MD(Interpreting  physician) on 03/11/2022 10:58 PM  ----------------------------------------------------------------  [email or web address] [id number]    CT ABDOMEN PELVIS WO CONTRAST Additional Contrast? None    Result Date: 3/14/2022  EXAMINATION: CT OF THE ABDOMEN AND PELVIS WITHOUT CONTRAST 3/14/2022 2:04 pm TECHNIQUE: CT of the abdomen and pelvis was performed without the administration of intravenous contrast. Multiplanar reformatted images are provided for review. Dose modulation, iterative reconstruction, and/or weight based adjustment of the mA/kV was utilized to reduce the radiation dose to as low as reasonably achievable. COMPARISON: 03/09/2022 HISTORY: ORDERING SYSTEM PROVIDED HISTORY: gastrocutaneous fistula TECHNOLOGIST PROVIDED HISTORY: OK FOR CONTRAST DOWN PEG TUBE. Reason for exam:->gastrocutaneous fistula Additional Contrast?->None FINDINGS: Lower Chest: Small bilateral pleural effusions, slightly increased from the prior study. Heart appears normal in size. There is a moderate sliding-type hiatal hernia. There are findings in the lung bases suggesting mild pulmonary fibrosis. Organs: Liver and spleen appear normal in size without focal lesion. There is a gastric cutaneous fistula containing a small amount of oral contrast material in the left upper abdomen. Just to the right of the midline there is a gastrostomy tube with its tip in the stomach body. GI/Bowel: The stomach and small bowel is generally decompressed. Fluid is noted within the colon. Oral contrast material is noted within small non distended distal small bowel loops and the cecum. Pelvis: No free pelvic fluid or mass. Urinary bladder is contracted and contains a Carrera catheter. Osteopenia is noted. There is a  fracture involving the superior pubic ramus on the left near the pubic body.   There is a also fracture of the inferior pubic ramus on the left. There are no visible sacral fractures. Iliac bones appear intact. Proximal femora appear normal. Peritoneum/Retroperitoneum: No retroperitoneal mass or adenopathy. Aorta is nonaneurysmal.  Unchanged left nephrolithiasis without renal obstruction. Bones/Soft Tissues: Left pelvic fractures as described above. Diffuse osteoporosis. Left convexity lower lumbar scoliosis and multilevel disc degenerative disease. 1.  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 2.  No intra-abdominal or abdominal wall abscess. 3.  Stable pelvic fractures on the left. 4.  No evidence of intestinal obstruction or fecal impaction. 5.  Slight increase in size of small bilateral pleural effusions. 6.  Gastrostomy tube and urinary Carrera catheter appear to be in appropriate position. CT ABDOMEN PELVIS WO CONTRAST Additional Contrast? None    Result Date: 3/9/2022  EXAMINATION: CT OF THE ABDOMEN AND PELVIS WITHOUT CONTRAST 3/9/2022 7:24 am TECHNIQUE: CT of the abdomen and pelvis was performed without the administration of intravenous contrast. Multiplanar reformatted images are provided for review. Dose modulation, iterative reconstruction, and/or weight based adjustment of the mA/kV was utilized to reduce the radiation dose to as low as reasonably achievable. COMPARISON: None HISTORY: ORDERING SYSTEM PROVIDED HISTORY: ? dislodged PEG TECHNOLOGIST PROVIDED HISTORY: Reason for exam:->? dislodged PEG Additional Contrast?->None FINDINGS: Lower Chest: Heart size is within normal limits. Coronary artery calcifications are noted, potentially a marker for coronary artery disease. A small to moderate hiatal hernia is present. Dependent atelectasis along with mild-to-moderate fibrotic changes are noted in the lung bases bilaterally. Organs: The unenhanced liver and gallbladder are normal in appearance, aside from mild biliary sludge. The spleen and adrenal glands are normal in size.  The unenhanced tube.  No drainable fluid collection. Severely dilated rectum which measures up to 8.4 cm and demonstrates circumferential wall thickening. These findings are consistent with fecal impaction and superimposed stercoral colitis. Streak artifact from barium contrast within rectal stool limits assessment for pneumatosis. Within this limitation, there is no obvious rectal pneumatosis. Acute appearing fractures involving the left superior and inferior pubic rami. Severe osteopenia limits assessment for nondisplaced sacral fractures, and given the findings in the left pelvis, a nondisplaced sacral fracture cannot be excluded. Nonobstructing 11 mm calculus in the left renal pelvis. XR CERVICAL SPINE (4-5 VIEWS)    Result Date: 3/14/2022  EXAMINATION: 5 XRAY VIEWS OF THE CERVICAL SPINE 3/14/2022 2:23 pm COMPARISON: None. HISTORY: ORDERING SYSTEM PROVIDED HISTORY: Prior neck fracture with rods in place TECHNOLOGIST PROVIDED HISTORY: Reason for exam:->Prior neck fracture with rods in place FINDINGS: There is a metallic cannulated screw transfixing the odontoid tip traversing the CT vertebral body obliquely. Moderate osteopenia is present. There is grade 1 anterolisthesis of C3 on C4 and C4 on C5 with significant degenerative disc disease at C5-6 and C6-7. There is mild bilateral foraminal narrowing due to uncovertebral joint osteophytes at C5-6. There is grade 1 anterolisthesis of C7 on T1 apparently due to facet arthropathy. Lung apices are generally clear with slight apical pleural thickening. Calcification is noted in the left carotid bulb. 1.  Metallic fixation of the C2 vertebral body and odontoid with a cannulated metallic screw. 2.  Moderate to severe multilevel facet and degenerative disc disease. 3.  Degenerative anterolisthesis at C3-4, C4-5 and C7-T1. 4.  Osteopenia. 5.  No acute fracture detected.      CT HEAD WO CONTRAST    Result Date: 3/17/2022  EXAMINATION: CT OF THE HEAD WITHOUT CONTRAST 3/17/2022 12:53 pm TECHNIQUE: CT of the head was performed without the administration of intravenous contrast. Dose modulation, iterative reconstruction, and/or weight based adjustment of the mA/kV was utilized to reduce the radiation dose to as low as reasonably achievable. COMPARISON: None. HISTORY: ORDERING SYSTEM PROVIDED HISTORY: Altered mental status TECHNOLOGIST PROVIDED HISTORY: Reason for exam:->Altered mental status Has a \"code stroke\" or \"stroke alert\" been called? ->No FINDINGS: BRAIN/VENTRICLES: There is no acute intracranial hemorrhage, mass effect or midline shift. No abnormal extra-axial fluid collection. The gray-white differentiation is maintained without evidence of an acute infarct. There is no evidence of hydrocephalus. Periventricular white matter changes consistent chronic microvascular disease. ORBITS: The visualized portion of the orbits demonstrate no acute abnormality. SINUSES: The visualized paranasal sinuses and mastoid air cells demonstrate no acute abnormality. SOFT TISSUES/SKULL:  No acute abnormality of the visualized skull or soft tissues. No acute intracranial abnormality. Periventricular white matter changes consistent chronic microvascular disease. RECOMMENDATIONS: Unavailable     XR CHEST PORTABLE    Result Date: 3/12/2022  EXAMINATION: ONE XRAY VIEW OF THE CHEST 3/12/2022 10:49 am COMPARISON: Chest x-ray 03/09/2022 HISTORY: ORDERING SYSTEM PROVIDED HISTORY: SOB TECHNOLOGIST PROVIDED HISTORY: Reason for exam:->SOB FINDINGS: Interval development of moderate pulmonary vascular congestion. Interval development of small pleural effusions. No evidence of pneumothorax. The heart is not significantly enlarged. Bones are stable. Osteopenia. Old left humeral neck fracture. Gastrostomy tube balloon projects over the epigastrium. Interval development of moderate pulmonary vascular congestion and small pleural effusions.      XR CHEST PORTABLE    Result Date: 3/9/2022  EXAMINATION: ONE XRAY VIEW OF THE CHEST 3/9/2022 1:36 pm COMPARISON: Chest radiograph June 23, 2014 HISTORY: ORDERING SYSTEM PROVIDED HISTORY: SOB TECHNOLOGIST PROVIDED HISTORY: Reason for exam:->SOB FINDINGS: The lungs are without acute focal process. There is blunting of bilateral costophrenic angles. No pneumothorax. The cardiomediastinal silhouette is without acute process. The osseous structures are without acute process. Small bilateral pleural effusions     XR CHEST 1 VIEW    Result Date: 3/17/2022  EXAMINATION: ONE XRAY VIEW OF THE CHEST 3/17/2022 1:12 pm COMPARISON: 03/12/2022 HISTORY: ORDERING SYSTEM PROVIDED HISTORY: SOB TECHNOLOGIST PROVIDED HISTORY: Reason for exam:->SOB FINDINGS: Small left effusion has improved. There is residual interstitial opacity in the lung bases. Heart is normal in size. No evidence of pneumothorax. Osseous structures are within normal limits. There has been interval placement of a left-sided PICC line catheter with its tip in the inferior SVC in good position. The tip is just below the level of the otto. 1.  Left PICC line catheter in good position. 2.  Improvement in left pleural effusion. HOSPITAL COURSE:   [employer] spent an extended period of time hospitalized and this will be a brief synopsis of the events that occurred during the hospitalization. The patient met sepsis criteria in the setting of a dislodged PEG tube with the development of abscess and intra-abdominal infection. She underwent surgical intervention with ultimate replacement of a new PEG tube. Infectious work-up revealed MRSA bacteremia. PICC line was placed and the patient will complete a course of antibiotics moving forward. Unfortunately, the patient had substantial output from the previous PEG tube site in the setting of a gastrocutaneous fistula. Tube feeds were not tolerated secondary to this fistula and the patient was started on TPN for caloric support.   Her family was updated on a regular basis and I met with her sister on day of discharge. She deals with intermittent metabolic encephalopathy that is multifactorial in nature. Her son Mark Jackson and her sister are very aware of the gravity of the situation. They understand the likelihood of worsening condition. She will be transferred to the [facility] facility for ongoing therapy, nutritional support with TPN, and IV antibiotic therapy. If her condition were to deteriorate, family realistically will choose for comfort measures and the institution of hospice. They would like to give the patient the benefit of the doubt. She is acceptable for transfer to the [facility] facility today. BRIEF PHYSICAL EXAMINATION AND LABORATORIES ON DAY OF DISCHARGE:  VITALS:  /64   Pulse 112   Temp 98.6 °F (37 °C) (Axillary)   Resp 19   Ht 4' 11\" (1.499 m)   Wt 106 lb 14.4 oz (48.5 kg)   SpO2 93%   BMI 21.59 kg/m²     General appearance:  Awake and alert. Attempts to answer questions accordingly. Head:  Normocephalic. No masses, lesions or tenderness. Eyes:  PERRLA. EOMI. Sclera clear. ENT:  Ears normal.  Dry mucous membranes  Neck:    Supple. Trachea midline. No thyromegaly. No JVD. No bruits. Heart:    Rhythm regular. Rate controlled. No murmurs. Lungs:    Diminished breath sounds throughout  Abdomen: Bowel sounds are present. PEG tube is in place. Ostomy bag is no longer necessary over the previous PEG tube site his drainage has decreased substantially  Extremities:    Peripheral pulses present. No peripheral edema. No ulcers. No cyanosis. No clubbing. Neurologic:    Awakens to verbal stimulus. Attempts to converse. Psych:   Behavior is normal. Mood appears normal.    Musculoskeletal:   Spine ROM normal. Muscular strength weak. Gait not assessed. Integumentary:  Excoriation under the breasts  Genitalia/Breast:  Voiding with use of a Carrera catheter.       DISPOSITION:  The patient will be transferred to the LTAC facility today. DISCHARGE MEDICATIONS:   Current Discharge Medication List           Details   albuterol (PROVENTIL) (2.5 MG/3ML) 0.083% nebulizer solution Take 3 mLs by nebulization every 6 hours as needed for Wheezing  Qty: 120 each, Refills: 3      buPROPion (WELLBUTRIN) 100 MG tablet Take 0.5 tablets by mouth every 6 hours  Qty: 60 tablet, Refills: 3      insulin lispro (HUMALOG) 100 UNIT/ML injection vial Inject 0-6 Units into the skin every 6 hours  Qty: 10 mL, Refills: 3      QUEtiapine (SEROQUEL) 25 MG tablet Take 1 tablet by mouth 2 times daily  Qty: 60 tablet, Refills: 3      miconazole (MICOTIN) 2 % powder Apply topically 2 times daily. Apply to abdominal folds, breast folds  Qty: 45 g, Refills: 1      glycerin moisturizing mouth spray (MOISTURIZING MOUTH SPRAY) 35 % LIQD Take 2 sprays by mouth as needed (dry mouth)      !! Petrolatum 42 % OINT Apply 1 applicator topically 3 times daily as needed (after toileting) Apply to bilateral buttocks      !! Petrolatum 42 % OINT Apply 1 applicator topically 2 times daily Apply to bilateral buttocks      omeprazole (PRILOSEC) 2 MG/ML SUSP 2 mg/mL oral suspension 20 mLs by Per G Tube route 2 times daily (before meals)      fluconazole (DIFLUCAN) 200MG/100ML in 0.9 % sodium chloride IVPB Infuse 100 mLs intravenously every 24 hours for 7 days  Qty: 700 mL, Refills: 0      vancomycin (VANCOCIN) infusion Infuse 1,250 mg intravenously every 24 hours for 21 days Compound per protocol. Qty: 27 g, Refills: 0       !! - Potential duplicate medications found. Please discuss with provider. Details   vitamin D (CHOLECALCIFEROL) 25 MCG (1000 UT) TABS tablet Take 1,000 Units by mouth daily      sertraline (ZOLOFT) 100 MG tablet Take 100 mg by mouth daily      levothyroxine (SYNTHROID) 50 MCG tablet Take 50 mcg by mouth Daily. acetaminophen (TYLENOL) 325 MG tablet Take 650 mg by mouth every 4 hours as needed for Pain or Fever.       cyanocobalamin 1000 MCG/ML injection Inject 1,000 mcg into the muscle once             Preparing for this patient's discharge, including paperwork, orders, instructions, and meeting with patient did require > 40 minutes.     Socorro Recinos DO   3/22/2022  12:12 PM

## 2022-03-22 NOTE — PLAN OF CARE
Problem: Falls - Risk of:  Goal: Will remain free from falls  Description: Will remain free from falls  3/22/2022 0450 by Tatiana Friedman RN  Outcome: Met This Shift     Problem: Falls - Risk of:  Goal: Absence of physical injury  Description: Absence of physical injury  3/22/2022 0450 by Tatiana Friedman RN  Outcome: Met This Shift     Problem: Skin Integrity:  Goal: Absence of new skin breakdown  Description: Absence of new skin breakdown  3/22/2022 0450 by Tatiana Friedman RN  Outcome: Met This Shift     Problem: Pain:  Goal: Pain level will decrease  Description: Pain level will decrease  Outcome: Met This Shift     Problem: Pain:  Goal: Control of acute pain  Description: Control of acute pain  Outcome: Met This Shift     Problem: Pain:  Goal: Control of chronic pain  Description: Control of chronic pain  Outcome: Met This Shift

## 2022-03-26 LAB
BLOOD CULTURE, ROUTINE: NORMAL
CULTURE, BLOOD 2: NORMAL

## 2023-09-22 NOTE — PROGRESS NOTES
OFFICE VISIT    Patient: Zoya Dunn   : 1938 MRN: [id number]    SUBJECTIVE:  Chief Complaint   Patient presents with   • Office Visit     6 months f/u   • Lipids   • Hypertension   • Imm/Inj     Flu       Patient has given consent to record this visit for documentation in their clinical record.    A 85 year old female presents for a follow-up of multiple medical conditions.      HISTORY OF PRESENT ILLNESS:    Historian: Self  with spouse.    Myalgia:  On Nar[place].     Essential hypertension, benign:  Blood pressure today in the office is 116/70 mmHg.    Need for vaccination:  Due for influenza vaccine.    Additional comments:  Screening labs: Blood count was normal. She was mild anemic in the previous visit, but she has no anemia as per current labs.  Hemoglobin level is 12.1 g/dL, previously was 11 g/dL. CMP is normal. Blood glucose level is sightly elevated at 116 mg/dL. Mentions she likes eating candies. Eats a lot of bread.    PAST MEDICAL HISTORY:  Past Medical History:   Diagnosis Date   • Anemia    • Anxiety    • Anxiety state, unspecified    • ARF (acute renal failure) (CMD) 2014   • Atrial fibrillation (CMD)     paroxismal- on Eliquis   • Back pain 2017    uses pain medication prn for this   • Breast cancer (CMD) 2012    left breast surgery and radiation   • Cataracts, bilateral    • Chronic pain     back pain from compression fractures   • Chronic renal failure     Dr. Juares   • Congestive cardiac failure (CMD)    • Coronary artery disease     PTCA done   • Depression    • Dermatitis     very sensitive skin   • Diverticulosis    • Essential (primary) hypertension    • Essential hypertension, benign 2013   • Fracture     Fx of foot; compression fx of back   • Fracture     right pelvic compression   • Heart attack (CMD) [id number]   • Itching     facial- around eyes   • Limited mobility     uses walker at night   • MI (myocardial infarction) (CMD) 1997    mild heart  PROGRESS NOTE  By Saray Akhtar M.D. [facility]  Dr. Osmel Yeboah M.D.,  Dr. Alexa Coronado M.D.,   Dr. Chetan Butler D.O.,  Dr. Jacquelyn Joe M.D.,  Dr. Ciro Shone, D.O.,  Gustavo Rodriguez D.O. Chuck Lee  67 y.o.  female    SUBJECTIVE:  Denies abdominal pain. No family at bedside    OBJECTIVE:    /69   Pulse 103   Temp 99.5 °F (37.5 °C) (Axillary)   Resp 22   Ht 4' 11\" (1.499 m)   Wt 116 lb 14.4 oz (53 kg)   SpO2 96%   BMI 23.61 kg/m²     General: NAD/elderly  female  HEENT: Anicteric sclera/moist oral mucosa  Neck: Supple with trachea midline  Chest: Symmetric excursion/now with respiration/clear to auscultation  Cor: Regular tachycardia/S1-S2  Abd.: Soft/appears nontender. PEG tube in place. Extr.:  Decreased muscle tone and bulk throughout. No significant peripheral edema  Skin: Warm and dry/anicteric      DATA:    Monitor data reviewed -sinus tachycardia noted.     Stool (measured) : 0 mL  Lab Results   Component Value Date    WBC 16.7 03/17/2022    RBC 3.04 03/17/2022    HGB 9.2 03/17/2022    HCT 29.1 03/17/2022    MCV 95.7 03/17/2022    MCH 30.3 03/17/2022    MCHC 31.6 03/17/2022    RDW 17.7 03/17/2022     03/17/2022    MPV 10.3 03/17/2022     Lab Results   Component Value Date     03/16/2022    K 4.3 03/16/2022     03/16/2022    CO2 26 03/16/2022    BUN 11 03/16/2022    CREATININE 0.6 03/16/2022    CALCIUM 9.5 03/16/2022    PROT 6.0 03/16/2022    LABALBU 3.0 03/16/2022    BILITOT 0.5 03/16/2022    ALKPHOS 74 03/16/2022    AST 35 03/16/2022    ALT 25 03/16/2022     Lab Results   Component Value Date    LIPASE 29 06/23/2014     Lab Results   Component Value Date    AMYLASE 202 06/24/2014         ASSESSMENT/PLAN:  Patient Active Problem List   Diagnosis    Odontoid fracture ([facility]    Hypothyroid    Multiple rib fractures    TMJ (temporomandibular joint syndrome)    Bipolar disorder ([facility]    Sciatica    Hiatal hernia    Abnormality of gait and mobility    Trigeminal neuralgia    Headache    Cervical neck pain with evidence of disc disease    Vomiting    Hypokalemia    Intra-abdominal infection    MRSA bacteremia    Paroxysmal atrial fibrillation (HCC)    Hypotension    Hypernatremia    Moderate protein-calorie malnutrition (HCC)    Sepsis (HCC)     1.  Anemia/rectal bleed  -Recurrent lower gastrointestinal bleeding with decreasing H&H  -Likely related to ulceration from fecal impaction  -Disimpaction/treatment per general surgery 3/10  -Repeat endoscopy revealing stercoral ulcer with pigmented spots which were treated  -please refer to pertinent procedure note   -Monitor H&H; defer transfusion to admitting      2.  Elevated transaminase  -Nonobstructive liver profile  -Likely related to general medical condition/medication  -Monitor labs     3.  PEG tube   -Infected PEG tube site/insertion of new PEG  -Per general surgery    Quang Anthony MD  3/17/2022  12:31 PM    NOTE:  This report was transcribed using voice recognition software. Every effort was made to ensure accuracy; however, inadvertent computerized transcription errors may be present. attack per pt notes   • Osteoarthrosis, unspecified whether generalized or localized, lower leg     both knees   • Osteoporosis, unspecified 10/10/2008    Was on Actonel but had side effects of bone brittleness   • Other and unspecified hyperlipidemia 10/10/2008   • Pacemaker 2014     St [facility]one for bradycardia- Dr. Nolen   • Personal history of radiation therapy 2012   • PONV (postoperative nausea and vomiting)    • Sinus pause    • Toe injury, right, initial encounter 09/30/2019    right 4th toe bruised and buddy taped for injury sustained   • Vaginal prolapse without uterine prolapse 02/27/2018    has pessary for stage III anterior dominant prolapse   • Wears glasses     for reading     MEDICATIONS:  Current Outpatient Medications   Medication Sig Dispense Refill   • nitroGLYCERIN (NITROSTAT) 0.4 MG sublingual tablet Place 1 tablet under the tongue every 5 minutes as needed for Chest pain. 25 tablet 1   • HYDROcodone-acetaminophen (NORCO) 5-325 MG per tablet Take 0.5 tablets by mouth at bedtime as needed for Pain. 30 tablet 0   • ALPRAZolam (XANAX) 0.25 MG tablet TAKE 1 TABLET BY MOUTH 3 TIMES DAILY AS NEEDED FOR SLEEP OR ANXIETY. 90 tablet 1   • propafenone (RYTHMOL) 150 MG tablet TAKE 1 TABLET BY MOUTH TWICE A  tablet 1   • Ferrous Sulfate 28 MG Tab Take 28 mg by mouth 3 days a week. 30 tablet    • ticagrelor (Brilinta) 60 MG tablet Take 1 tablet by mouth in the morning and 1 tablet in the evening. 180 tablet 1   • lisinopril (ZESTRIL) 5 MG tablet TAKE 1 TABLET BY MOUTH EVERY DAY AT NIGHT 90 tablet 1   • Eliquis 2.5 MG Tab TAKE 1 TABLET BY MOUTH EVERY 12 HOURS 60 tablet 5   • rosuvastatin (CRESTOR) 5 MG tablet TAKE 1/2 TABLET BY MOUTH EVERY OTHER DAY, ALTERNATING BY TAKING 1 TABLET 68 tablet 1   • metoPROLOL succinate (TOPROL-XL) 25 MG 24 hr tablet TAKE 1/2 TABLET BY MOUTH DAILY 45 tablet 5   • amoxicillin (AMOXIL) 500 MG capsule      • Multiple Vitamins-Minerals (PRESERVISION AREDS 2) Cap Take 1 capsule by  mouth 2 times daily.      • calcium-vitamin D (OSCAL-500) 500-200 MG-UNIT per tablet Take 1 tablet by mouth 2 times daily (with meals).      • acetaminophen (TYLENOL) 500 MG tablet Take 1,000 mg by mouth at bedtime as needed for Pain.      • Propylene Glycol (SYSTANE BALANCE OP) Place 1-2 drops into both eyes 2 times daily.      • Cholecalciferol (VITAMIN D-3) 1000 UNITS Cap Take 2 capsules by mouth daily (at noon). Dose: 2 capsules (= 2000 mg)     • folic acid (FOLATE) 400 MCG tablet Take 400 mcg by mouth daily.     • Cyanocobalamin (VITAMIN B 12 PO) Take 250 mcg by mouth every other day. Takes at noon       No current facility-administered medications for this visit.     Facility-Administered Medications Ordered in Other Visits   Medication Dose Route Frequency Provider Last Rate Last Admin   • heparin 2 unit/mL in NaCL 0.9% solution    PRN Scott Howe MD   500 mL at 06/01/18 0810     ALLERGIES:  Allergies as of 09/21/2023 - Reviewed 09/21/2023   Allergen Reaction Noted   • Actonel DIZZINESS 11/17/2008   • Fosamax MYALGIA 11/06/2015   • Miacalcin DIARRHEA 12/28/2010   • Niaspan [niacin (antihyperlipidemic)] MYALGIA 06/23/2009   • Statins MYALGIA 10/10/2008   • Zetia [ezetimibe] MYALGIA 06/23/2009   • Tricor MYALGIA 06/23/2009     FAMILY HISTORY:  Family History   Problem Relation Age of Onset   • Cancer, Breast Mother    • Cancer Mother         breast   • Depression Mother    • Cancer Father         lung   • Cancer, Breast Sister    • Cancer Sister         breast   • Osteoarthritis Sister    • Osteoarthritis Sister    • Depression Sister    • Heart disease Sister    • Depression Sister    • Heart disease Sister    • Cancer Sister    • Heart disease Brother    • Early death Brother    • Heart disease Brother    • Early death Brother    • Heart disease Brother    • Heart disease Brother    • Cancer, Breast Maternal Aunt    • Cancer Maternal Aunt 60        Breast   • Cancer, Breast Maternal Aunt    • Cancer  Maternal Aunt 60        breast     SOCIAL HISTORY:  Social History     Tobacco Use   • Smoking status: Former     Current packs/day: 0.00     Average packs/day: 3.0 packs/day for 15.0 years (45.0 ttl pk-yrs)     Types: Cigarettes     Start date: 1960     Quit date: 1975     Years since quittin.7   • Smokeless tobacco: Never   Substance Use Topics   • Alcohol use: Yes     Alcohol/week: 1.0 standard drink of alcohol     Types: 1 Glasses of wine per week     Comment: 2-3 week   • Drug use: No     Past Surgical HISTORY  Past Surgical History:   Procedure Laterality Date   • Back surgery  2015    kephoplasty   • Breast biopsy Left     Excisional Biopsy- benign   • Breast lumpectomy Left 2012   • Cardiac catherization  2015   • Cataract extraction w/  intraocular lens implant Bilateral 2018    cataract extraction with IOL Implantation   • Colonoscopy  ;    Colonoscopy/polypectomy   • Dexa bone density axial skeleton  2005    Severe bone mineral density loss consistent with osteoporosis   • Endometrial bx w colposcopy  1991    Minute fragment of squamous metaplastic endocervical mucosa.   • Eye surgery Bilateral 2018    cataract surgery   • Hemorrhoid surgery  remote past   • Joint replacement     • Leadless pacemaker  2014    St Tyronetanner Nolen   • Left heart cath  2018    Patent stents   • Mammo stereotactic biopsy left Left 2014    Benign   • Ptca  2005    stent;LAD;RCA   • Ptca with stent  2018    patent lad stent  RCA HONEY x 2 placed   • Ptca with stent  2018    LAD HONEY    • Removal gallbladder     • Skin biopsy     • Total knee replacement Right     TKR   • Total knee replacement Left 2012    TKR   • Tubal ligation     • Vaginal delivery      x2   • Vascular surgery       REVIEW OF SYSTEMS:  Constitutional: Per HPI.  Metabolic/Endocrine: Per HPI.  Musculoskeletal: Per HPI.    OBJECTIVE:  Visit Vitals  /70   Pulse  (!) 57   Ht 5'   Wt 61.2 kg (135 lb)   SpO2 96%   BMI 26.37 kg/m²     PHYSICAL EXAM:  Constitutional: In no acute distress. Well developed.  Eyes: The conjunctiva exhibited no abnormalities. The sclera was normal.  Neck: Normal carotids.   Respiratory: breath sounds clear to auscultation bilaterally, but no respiratory distress and normal respiratory rate and effort.  Cardiovascular: normal rate, regular rhythm, normal S1, normal S2.  Abdomen: soft and nontender.  Extremities: No clubbing or cyanosis. No edema in bilateral lower extremities.  Psychiatric: Oriented to time, place, and person. The mood was normal. The affect was normal. The memory was unimpaired.  Skin: Skin moisture and turgor normal.    DIAGNOSTIC STUDIES:  LAB RESULTS:  Lab Services on 09/14/2023   Component Date Value Ref Range Status   • Sodium 09/14/2023 135  135 - 145 mmol/L Final   • Potassium 09/14/2023 4.2  3.4 - 5.1 mmol/L Final   • Chloride 09/14/2023 103  97 - 110 mmol/L Final   • Carbon Dioxide 09/14/2023 27  21 - 32 mmol/L Final   • Anion Gap 09/14/2023 9  7 - 19 mmol/L Final   • Glucose 09/14/2023 116 (H)  70 - 99 mg/dL Final   • BUN 09/14/2023 22 (H)  6 - 20 mg/dL Final   • Creatinine 09/14/2023 0.92  0.51 - 0.95 mg/dL Final   • Glomerular Filtration Rate 09/14/2023 61  >=60 Final   • BUN/Cr 09/14/2023 24  7 - 25 Final   • Calcium 09/14/2023 9.4  8.4 - 10.2 mg/dL Final   • Bilirubin, Total 09/14/2023 1.0  0.2 - 1.0 mg/dL Final   • GOT/AST 09/14/2023 19  <=37 Units/L Final   • GPT/ALT 09/14/2023 21  <64 Units/L Final   • Alkaline Phosphatase 09/14/2023 78  45 - 117 Units/L Final   • Albumin 09/14/2023 4.2  3.6 - 5.1 g/dL Final   • Protein, Total 09/14/2023 7.0  6.4 - 8.2 g/dL Final   • Globulin 09/14/2023 2.8  2.0 - 4.0 g/dL Final   • A/G Ratio 09/14/2023 1.5  1.0 - 2.4 Final   • Cholesterol 09/14/2023 197  <=199 mg/dL Final   • Triglycerides 09/14/2023 127  <=149 mg/dL Final   • HDL 09/14/2023 63  >=50 mg/dL Final   • LDL 09/14/2023  109  <=129 mg/dL Final   • Non-HDL Cholesterol 09/14/2023 134  mg/dL Final   • Cholesterol/ HDL Ratio 09/14/2023 3.1  <=4.4 Final   • WBC 09/14/2023 6.4  4.2 - 11.0 K/mcL Final   • RBC 09/14/2023 3.67 (L)  4.00 - 5.20 mil/mcL Final   • HGB 09/14/2023 12.1  12.0 - 15.5 g/dL Final   • HCT 09/14/2023 36.0  36.0 - 46.5 % Final   • MCV 09/14/2023 98.1  78.0 - 100.0 fl Final   • MCH 09/14/2023 33.0  26.0 - 34.0 pg Final   • MCHC 09/14/2023 33.6  32.0 - 36.5 g/dL Final   • RDW-CV 09/14/2023 12.7  11.0 - 15.0 % Final   • RDW-SD 09/14/2023 46.5  39.0 - 50.0 fL Final   • PLT 09/14/2023 211  140 - 450 K/mcL Final   • Neutrophil, Percent 09/14/2023 77  % Final   • Lymphocytes, Percent 09/14/2023 12  % Final   • Mono, Percent 09/14/2023 10  % Final   • Eosinophils, Percent 09/14/2023 1  % Final   • Basophils, Percent 09/14/2023 0  % Final   • Immature Granulocytes 09/14/2023 0  % Final   • Absolute Neutrophils 09/14/2023 4.8  1.8 - 7.7 K/mcL Final   • Absolute Lymphocytes 09/14/2023 0.8 (L)  1.0 - 4.0 K/mcL Final   • Absolute Monocytes 09/14/2023 0.7  0.3 - 0.9 K/mcL Final   • Absolute Eosinophils  09/14/2023 0.1  0.0 - 0.5 K/mcL Final   • Absolute Basophils 09/14/2023 0.0  0.0 - 0.3 K/mcL Final   • Absolute Immature Granulocytes 09/14/2023 0.0  0.0 - 0.2 K/mcL Final       ASSESSMENT AND PLAN:  This 85 year old female presents with :  1. Myalgia    2. Atherosclerosis of native coronary artery of native heart without angina pectoris    3. AF (paroxysmal atrial fibrillation) (CMD)    4. SA node dysfunction (CMD)    5. Cardiac pacemaker in situ    6. Essential hypertension, benign    7. Need for vaccination        Orders Placed This Encounter   • Influenza Quadrivalent Enhanced High Dose Split Pres Free 0.7 mL Pre-filled Vacc (Fluzone High Dose Quad; ages 65+ yrs) - [id number]   • nitroGLYCERIN (NITROSTAT) 0.4 MG sublingual tablet   • HYDROcodone-acetaminophen (NORCO) 5-325 MG per tablet     PLAN:    Myalgia  Refills provided for  Hydrocodone-acetaminophen (NORCO) 5-325 MG per tablet; Take 0.5 tablets by mouth at bedtime as needed for Pain.  Dispense: 30 tablet; Refill: 0    Atherosclerosis of native coronary artery of native heart without angina pectoris  Continue current management.    AF (paroxysmal atrial fibrillation) (CMD)  Refills provided for nitroGLYCERIN (NITROSTAT) 0.4 MG sublingual tablet. Place 1 tablet under the tongue every 5 minutes as needed for Chest pain. Disp-25 tablet, R-1    SA node dysfunction (CMD)  Continue current managment.    Cardiac pacemaker in situ  Continue current managment.    Essential hypertension, benign  Continue current managment.    Need for vaccination  Ordered and administered Influenza Quadrivalent Enhanced High Dose Split Pres Free 0.7 mL Pre-filled Vacc (Fluzone High Dose Quad; ages 65+ yrs) - [id number] today in the office.  Recommended RSV vaccine from the pharmacy. Discussed in detail.  Recommended new COVID vaccine from the pharmacy when available.    Reviewed and discussed labs in detail.  Recommended heating healthy and avoid sweets.    Return in about 6 months (around 3/21/2024) for wellness.    Refer to orders.  Medical compliance with plan discussed and risks of non-compliance reviewed.  Patient education completed on disease process, etiology & prognosis.  Proper usage and side effects of medications reviewed & discussed.  Return to clinic as clinically indicated as discussed with the patient who verbalized understanding of the plan and is in agreement with the plan.    IMarky, have created a visit summary document based on the audio recording between Dr. Rory English MD and this patient for the physician to review, edit as needed, and authenticate.    Creation Date: 9/22/2023      I have reviewed and edited the visit summary above and attest that it is accurate.    Rory English MD

## (undated) DEVICE — 6 X 9  1.75MIL 4-WALL LABGUARD: Brand: MINIGRIP COMMERCIAL LLC

## (undated) DEVICE — PACK,UNIVERSAL,NO GOWNS: Brand: MEDLINE

## (undated) DEVICE — GAUZE,SPONGE,4"X4",16PLY,XRAY,STRL,LF: Brand: MEDLINE

## (undated) DEVICE — MARKER,SKIN,WI/RULER AND LABELS: Brand: MEDLINE

## (undated) DEVICE — TOWEL,OR,DSP,ST,BLUE,STD,6/PK,12PK/CS: Brand: MEDLINE

## (undated) DEVICE — AGENT HEMSTAT W2XL4IN OXIDIZED REGENERATED CELOS ABSRB

## (undated) DEVICE — GOWN,SIRUS,NONRNF,SETINSLV,XL,20/CS: Brand: MEDLINE

## (undated) DEVICE — GLOVE ORANGE PI 7 1/2   MSG9075

## (undated) DEVICE — KIT BEDSIDE REVITAL OX 500ML

## (undated) DEVICE — NEEDLE HYPO 25GA L1.5IN BLU POLYPR HUB S STL REG BVL STR

## (undated) DEVICE — Device: Brand: DEFENDO VALVE AND CONNECTOR KIT

## (undated) DEVICE — READY WET SKIN SCRUB TRAY-LF: Brand: MEDLINE INDUSTRIES, INC.

## (undated) DEVICE — SIGMOIDOSCOPIC SUCTION INSTRUMENT 18 FR W/WINGED CAP CONTROL AND 6 FOOT (1.8M) TUBING: Brand: SIGMOIDOSCOPIC

## (undated) DEVICE — YANKAUER,BULB TIP,W/O VENT,RIGID,STERILE: Brand: MEDLINE

## (undated) DEVICE — TRAY PROCED CUSTOM RECTAL

## (undated) DEVICE — SYRINGE MED 10ML TRNSLUC BRL PLUNG BLK MRK POLYPR CTRL

## (undated) DEVICE — COVER,LIGHT HANDLE,FLX,1/PK: Brand: MEDLINE INDUSTRIES, INC.

## (undated) DEVICE — NDL CNTR 40CT FM MAG: Brand: MEDLINE INDUSTRIES, INC.

## (undated) DEVICE — VASELINE PETROLATUM GAUZE STRIP: Brand: VASELINE

## (undated) DEVICE — COVER,TABLE,44X90,STERILE: Brand: MEDLINE

## (undated) DEVICE — KENDALL 450 SERIES MONITORING FOAM ELECTRODE - RECTANGULAR SHAPE ( 3/PK): Brand: KENDALL

## (undated) DEVICE — DOUBLE BASIN SET: Brand: MEDLINE INDUSTRIES, INC.

## (undated) DEVICE — Device

## (undated) DEVICE — GOWN ISOLATN REG YEL M WT MULTIPLY SIDETIE LEV 2

## (undated) DEVICE — INTENDED FOR TISSUE SEPARATION, AND OTHER PROCEDURES THAT REQUIRE A SHARP SURGICAL BLADE TO PUNCTURE OR CUT.: Brand: BARD-PARKER ® STAINLESS STEEL BLADES

## (undated) DEVICE — FORCEPS BX L240CM JAW DIA2.8MM L CAP W/ NDL MIC MESH TOOTH

## (undated) DEVICE — SURGIFOAM SPNG SZ 100C

## (undated) DEVICE — SPONGE GZ 4IN 4IN 4 PLY N WVN AVANT

## (undated) DEVICE — BIPOLAR ELECTROHEMOSTASIS CATHETER: Brand: GOLD PROBE

## (undated) DEVICE — KIT PEG 20FR STD PUL EN ACCS DEV ENDOVIVE

## (undated) DEVICE — 1-PIECE DRAINABLE OSTOMY POUCH, CONVEX, FLEXTEND: Brand: PREMIER

## (undated) DEVICE — SET SURG INSTR SIGMOID REUSABLE

## (undated) DEVICE — CONTAINER SPEC COLL 960ML POLYPR TRIANG GRAD INTAKE/OUTPUT

## (undated) DEVICE — SYRINGE IRRIG 60ML SFT PLIABLE BLB EZ TO GRP 1 HND USE W/

## (undated) DEVICE — TUBING, SUCTION, 1/4" X 10', STRAIGHT: Brand: MEDLINE

## (undated) DEVICE — MASK,FACE,MAXFLUIDPROTECT,SHIELD/ERLPS: Brand: MEDLINE

## (undated) DEVICE — GAUZE,SPONGE,4"X4",16PLY,STRL,LF,10/TRAY: Brand: MEDLINE

## (undated) DEVICE — PENCIL ES L3M BTTN SWCH HOLSTER W/ BLDE ELECTRD EDGE

## (undated) DEVICE — LUBRICANT SURG JELLY ST BACTER TUBE 4.25OZ

## (undated) DEVICE — GLOVE SURG SZ 65 THK91MIL LTX FREE SYN POLYISOPRENE